# Patient Record
Sex: MALE | Race: WHITE | NOT HISPANIC OR LATINO | Employment: OTHER | URBAN - METROPOLITAN AREA
[De-identification: names, ages, dates, MRNs, and addresses within clinical notes are randomized per-mention and may not be internally consistent; named-entity substitution may affect disease eponyms.]

---

## 2017-02-16 PROBLEM — M10.9 GOUT: Status: ACTIVE | Noted: 2017-02-16

## 2017-02-16 PROBLEM — E66.9 MODERATE OBESITY: Status: ACTIVE | Noted: 2017-02-16

## 2017-02-16 PROBLEM — L40.9 PSORIASIS: Status: ACTIVE | Noted: 2017-02-16

## 2017-02-16 PROBLEM — R00.2 PALPITATIONS: Status: ACTIVE | Noted: 2017-02-16

## 2017-02-16 PROBLEM — E66.8 MODERATE OBESITY: Status: ACTIVE | Noted: 2017-02-16

## 2017-02-16 PROBLEM — I11.9 HYPERTENSIVE CARDIOVASCULAR DISEASE: Status: ACTIVE | Noted: 2017-02-16

## 2017-02-16 PROBLEM — K63.5 COLON POLYPS: Status: ACTIVE | Noted: 2017-02-16

## 2018-08-08 ENCOUNTER — OFFICE VISIT (OUTPATIENT)
Dept: ORTHOPEDIC SURGERY | Facility: CLINIC | Age: 83
End: 2018-08-08

## 2018-08-08 VITALS — BODY MASS INDEX: 31.47 KG/M2 | OXYGEN SATURATION: 99 % | HEART RATE: 52 BPM | HEIGHT: 64 IN | WEIGHT: 184.3 LBS

## 2018-08-08 DIAGNOSIS — M17.11 PRIMARY OSTEOARTHRITIS OF RIGHT KNEE: Primary | ICD-10-CM

## 2018-08-08 PROCEDURE — 99203 OFFICE O/P NEW LOW 30 MIN: CPT | Performed by: ORTHOPAEDIC SURGERY

## 2018-08-08 RX ORDER — CHLORHEXIDINE GLUCONATE 4 G/100ML
SOLUTION TOPICAL DAILY
Qty: 236 ML | Refills: 0 | Status: ON HOLD | OUTPATIENT
Start: 2018-08-08 | End: 2018-11-29

## 2018-08-08 RX ORDER — ALLOPURINOL 300 MG/1
300 TABLET ORAL DAILY
COMMUNITY

## 2018-08-08 RX ORDER — ACETAMINOPHEN 325 MG/1
1000 TABLET ORAL ONCE
Status: CANCELLED | OUTPATIENT
Start: 2018-08-08 | End: 2018-08-08

## 2018-08-08 RX ORDER — MELOXICAM 7.5 MG/1
15 TABLET ORAL ONCE
Status: CANCELLED | OUTPATIENT
Start: 2018-08-08 | End: 2018-08-08

## 2018-08-08 RX ORDER — LISINOPRIL AND HYDROCHLOROTHIAZIDE 20; 12.5 MG/1; MG/1
1 TABLET ORAL DAILY
COMMUNITY
Start: 2018-07-24

## 2018-08-08 RX ORDER — PREGABALIN 150 MG/1
150 CAPSULE ORAL ONCE
Status: CANCELLED | OUTPATIENT
Start: 2018-08-08 | End: 2018-08-08

## 2018-08-08 NOTE — PROGRESS NOTES
[facility]    Subjective     Chief Complaint   Patient presents with   • Right Knee - Pain     Right Knee Cartilage Removal done 2004 by Dr. Aragon        HPI    Raleigh Paredes is a 83 y.o. male.  He presents today for evaluation of right knee pain.  He's had moderate to severe knee pain, aching in quality, associated with grinding and stiffness for many years now.  He's had numerous Hyalgan injections since 2001, which helped briefly.  He is interested in discussing right knee replacement surgery, and would like to proceed later on this year.    The patient has been considering right knee total joint replacement surgery.  The pain has been severe and has been worsening in spite of medications, joint injections, and bracing. The pain interferes with walking and sleeping. No previous history of blood clots, nor family history of clotting disorders.      Patient Active Problem List   Diagnosis   • Hypertensive cardiovascular disease   • Palpitations   • Gout   • Moderate obesity   • Psoriasis   • Colon polyps     Past Medical History:   Diagnosis Date   • Colon polyps 2/16/2017   • Diverticulitis    • Diverticulosis    • Gout 2/16/2017   • Hypertensive cardiovascular disease 2/16/2017   • Moderate obesity 2/16/2017   • Palpitations 2/16/2017   • Psoriasis 2/16/2017      Past Surgical History:   Procedure Laterality Date   • CARDIAC CATHETERIZATION  2000   • CYST REMOVAL  1978    Cyst removed from spine, 1978.   • INGUINAL HERNIA REPAIR Bilateral 2009   • KNEE CARTILAGE SURGERY Right 2004    Right knee removal of cartilage, 2004, Dr. Dick.        Family History   Problem Relation Age of Onset   • Colon cancer Other      Social History     Social History   • Marital status:      Spouse name: N/A   • Number of children: N/A   • Years of education: N/A     Occupational History   • Not on file.     Social History Main Topics   • Smoking status: Former Smoker     Quit date: 1/1/1965    • Smokeless tobacco: Never Used   • Alcohol use No   • Drug use: No   • Sexual activity: Defer     Other Topics Concern   • Not on file     Social History Narrative   • No narrative on file      No current outpatient prescriptions on file prior to visit.     No current facility-administered medications on file prior to visit.       Allergies   Allergen Reactions   • Penicillins Itching     Hand edema        Review of Systems   Constitutional: Negative for activity change, appetite change, chills, diaphoresis, fatigue, fever and unexpected weight change.   HENT: Positive for drooling and hearing loss. Negative for congestion, dental problem, ear discharge, ear pain, facial swelling, mouth sores, nosebleeds, postnasal drip, rhinorrhea, sinus pressure, sneezing, sore throat, tinnitus, trouble swallowing and voice change.    Eyes: Negative for photophobia, pain, discharge, redness, itching and visual disturbance.   Respiratory: Negative for apnea, cough, choking, chest tightness, shortness of breath, wheezing and stridor.    Cardiovascular: Negative for chest pain, palpitations and leg swelling.   Gastrointestinal: Negative for abdominal distention, abdominal pain, anal bleeding, blood in stool, constipation, diarrhea, nausea, rectal pain and vomiting.   Endocrine: Negative for cold intolerance, heat intolerance, polydipsia, polyphagia and polyuria.   Genitourinary: Negative for decreased urine volume, difficulty urinating, dysuria, enuresis, flank pain, frequency, genital sores, hematuria and urgency.   Musculoskeletal: Positive for back pain. Negative for arthralgias, gait problem, myalgias, neck pain and neck stiffness.        Joint Pain    Skin: Negative for color change, pallor, rash and wound.   Allergic/Immunologic: Negative for environmental allergies, food allergies and immunocompromised state.   Neurological: Negative for dizziness, tremors, seizures, syncope, facial asymmetry, speech difficulty, weakness,  "light-headedness, numbness and headaches.   Hematological: Negative for adenopathy. Does not bruise/bleed easily.   Psychiatric/Behavioral: Negative for agitation, behavioral problems, confusion, decreased concentration, dysphoric mood, hallucinations, self-injury, sleep disturbance and suicidal ideas. The patient is not nervous/anxious and is not hyperactive.         Objective      Physical Exam  Pulse 52   Ht 162 cm (63.78\")   Wt 83.6 kg (184 lb 4.9 oz)   SpO2 99%   BMI 31.85 kg/m²     Body mass index is 31.85 kg/m².    General:   Mental Status:  Alert   Appearance: Cooperative, in no acute distress   Build and Nutrition: Well-nourished and well developed male   Orientation: Alert and oriented to person, place and time   Posture: Normal   Gait: Normal    Integument:   Right knee: No skin lesions, no rash, no ecchymosis    Neurologic:   Motor:  Right lower extremity: 5/5 quadriceps, hamstrings, ankle dorsiflexors, and ankle plantar flexors    Lower Extremities:   Right Knee:    Tenderness:  Medial joint line tenderness    Effusion:  None    Swelling:  None    Crepitus:  Positive    Atrophy:  None    Range of motion:  Extension: 0°       Flexion: 120°  Instability:  No varus laxity, no valgus laxity, negative anterior drawer  Deformities:  Varus        Imaging/Studies  Imaging Results (last 24 hours)     Procedure Component Value Units Date/Time    XR Knee 4+ View Right [[id number]] Resulted:  08/08/18 1051     Updated:  08/08/18 1052    Narrative:       Right Knee Radiographs  Indication: right knee pain  Views: Standing AP's and skiers of both knees, with lateral and sunrise   views of the right knee    Comparison: 3/27/2013    Findings:   Bone-on-bone contact medial compartment, with tricompartmental   osteophytes, significant varus alignment, and worsening compared to the   previous films from 2013.    Impression: Advanced right knee arthritis.            Assessment and Plan     Raleigh was seen today for " pain.    Diagnoses and all orders for this visit:    Primary osteoarthritis of right knee  -     XR Knee 4+ View Right  -     Case Request; Standing  -     Instructions on coughing, deep breathing, and incentive spirometry.; Future  -     CBC and Differential; Future  -     Basic metabolic panel; Future  -     Protime-INR; Future  -     APTT; Future  -     Hemoglobin A1c; Future  -     Sedimentation rate; Future  -     C-reactive protein; Future  -     Urinalysis With Culture If Indicated - Urine, Clean Catch; Future  -     Tranexamic Acid 1,000 mg in sodium chloride 0.9 % 100 mL; Infuse 1,000 mg into a venous catheter 1 (One) Time.  -     Tranexamic Acid 1,000 mg in sodium chloride 0.9 % 100 mL; Infuse 1,000 mg into a venous catheter 1 (One) Time.  -     acetaminophen (TYLENOL) tablet 975 mg; Take 3 tablets by mouth 1 (One) Time.  -     meloxicam (MOBIC) tablet 15 mg; Take 2 tablets by mouth 1 (One) Time.  -     pregabalin (LYRICA) capsule 150 mg; Take 1 capsule by mouth 1 (One) Time.  -     mupirocin (BACTROBAN) 2 % nasal ointment 1 application; 1 application by Each Nare route 1 (One) Time.  -     ceFAZolin (ANCEF) 2 g in sodium chloride 0.9 % 100 mL IVPB; Infuse 2 g into a venous catheter 1 (One) Time.  -     Case Request    Other orders  -     Outpatient In A Bed; Standing  -     Follow Anesthesia Guidelines / Standing Orders; Future  -     Obtain informed consent  -     Provide instructions to patient regarding NPO status  -     Clorhexidine skin prep  -     Follow Anesthesia Guidelines / Standing Orders; Standing  -     Nerve Block; Standing  -     Verify NPO Status; Standing  -     SCD (sequential compression device)- to be placed on patient in Pre-op; Standing  -     POC Glucose Once; Standing  -     Clip operative site; Standing  -     Obtain informed consent (if not collected inpatient or PAT); Standing  -     Notify Physician - Standard; Standing  -     NPO After Midnight  -     mupirocin (BACTROBAN  NASAL) 2 % nasal ointment; into the nostril(s) as directed by provider 2 (Two) Times a Day.  -     chlorhexidine (HIBICLENS) 4 % external liquid; Apply  topically to the appropriate area as directed Daily. Shower with hibiclens solution as directed for 5 days prior to surgery        I reviewed my findings with patient today.  He has advanced right knee arthritis, and is a candidate for knee replacement surgery.  We'll need to wait 3 months after his last injection before proceeding.  Please see my counseling note for details.  He has exhausted conservative treatment modalities.    Surgical Counseling     I have informed the patient of the diagnosis and the prognosis.  Exhaustive conservative treatment modalities have not resulted in long term pain relief.  The symptoms have progressed to the point of daily pain and inability to perform activities of daily living without significant pain. The patient has reached the point of desiring to proceed with total knee arthroplasty after discussing the risks, benefits and alternatives to the procedure.  The surgical procedure itself was discussed in detail. Risks of the procedure were discussed, which included but are not limited to, bleeding, infection, damage to blood vessels and nerves, incomplete pain relief, loosening of the prosthesis, deep infection, need for further surgery, loss of limb, deep venous thrombosis, pulmonary embolus, death, heart attack, stroke, kidney failure, liver failure, and anesthetic complications.  In addition, the potential for deep infection developing in the future was discussed, which could require further surgery.  The knee would have to be re-opened, debrided, and potentially remove the prosthesis, which may or may not be replaced in the future.  Also, the possibility for loosening of the prosthesis has been mentioned.  If the prosthesis loosened, a revision arthroplasty could be performed, with results that are not as predictable compared  to the original procedure.  The typical rehabilitative course has also been discussed, and full recovery may take up to a year to see the maximum benefit.  The importance of patient cooperation in the rehabilitative efforts has also been discussed.  No guarantees whatsoever were given.  The patient understands the potential risks versus the benefits and desires to proceed with total knee arthroplasty at a mutually convenient time.    Return for For surgery as planned.      Medical Decision Making  Management Options : major surgery with risk factors  Data/Risk: radiology tests and independent visualization of imaging, lab tests, or EMG/NCV      Ned Pizarro MD  08/08/18  12:38 PM

## 2018-11-15 ENCOUNTER — APPOINTMENT (OUTPATIENT)
Dept: PREADMISSION TESTING | Facility: HOSPITAL | Age: 83
End: 2018-11-15

## 2018-11-15 VITALS — WEIGHT: 194.67 LBS | BODY MASS INDEX: 34.49 KG/M2 | HEIGHT: 63 IN

## 2018-11-15 DIAGNOSIS — M17.11 PRIMARY OSTEOARTHRITIS OF RIGHT KNEE: ICD-10-CM

## 2018-11-15 LAB
ANION GAP SERPL CALCULATED.3IONS-SCNC: 4 MMOL/L (ref 3–11)
APTT PPP: 26.8 SECONDS (ref 24–31)
BACTERIA UR QL AUTO: NORMAL /HPF
BASOPHILS # BLD AUTO: 0.05 10*3/MM3 (ref 0–0.2)
BASOPHILS NFR BLD AUTO: 0.6 % (ref 0–1)
BILIRUB UR QL STRIP: NEGATIVE
BUN BLD-MCNC: 23 MG/DL (ref 9–23)
BUN/CREAT SERPL: 20.5 (ref 7–25)
CALCIUM SPEC-SCNC: 8.9 MG/DL (ref 8.7–10.4)
CHLORIDE SERPL-SCNC: 106 MMOL/L (ref 99–109)
CLARITY UR: CLEAR
CO2 SERPL-SCNC: 30 MMOL/L (ref 20–31)
COLOR UR: YELLOW
CREAT BLD-MCNC: 1.12 MG/DL (ref 0.6–1.3)
CRP SERPL-MCNC: 0.48 MG/DL (ref 0–1)
DEPRECATED RDW RBC AUTO: 42 FL (ref 37–54)
EOSINOPHIL # BLD AUTO: 0.52 10*3/MM3 (ref 0–0.3)
EOSINOPHIL NFR BLD AUTO: 6 % (ref 0–3)
ERYTHROCYTE [DISTWIDTH] IN BLOOD BY AUTOMATED COUNT: 12.9 % (ref 11.3–14.5)
ERYTHROCYTE [SEDIMENTATION RATE] IN BLOOD: 6 MM/HR (ref 0–20)
GFR SERPL CREATININE-BSD FRML MDRD: 62 ML/MIN/1.73
GLUCOSE BLD-MCNC: 90 MG/DL (ref 70–100)
GLUCOSE UR STRIP-MCNC: NEGATIVE MG/DL
HBA1C MFR BLD: 7.1 % (ref 4.8–5.6)
HCT VFR BLD AUTO: 44.7 % (ref 38.9–50.9)
HGB BLD-MCNC: 15.3 G/DL (ref 13.1–17.5)
HGB UR QL STRIP.AUTO: NEGATIVE
HYALINE CASTS UR QL AUTO: NORMAL /LPF
IMM GRANULOCYTES # BLD: 0.02 10*3/MM3 (ref 0–0.03)
IMM GRANULOCYTES NFR BLD: 0.2 % (ref 0–0.6)
INR PPP: 1 (ref 0.91–1.09)
KETONES UR QL STRIP: NEGATIVE
LEUKOCYTE ESTERASE UR QL STRIP.AUTO: NEGATIVE
LYMPHOCYTES # BLD AUTO: 1.66 10*3/MM3 (ref 0.6–4.8)
LYMPHOCYTES NFR BLD AUTO: 19.3 % (ref 24–44)
MCH RBC QN AUTO: 30.8 PG (ref 27–31)
MCHC RBC AUTO-ENTMCNC: 34.2 G/DL (ref 32–36)
MCV RBC AUTO: 90.1 FL (ref 80–99)
MONOCYTES # BLD AUTO: 0.84 10*3/MM3 (ref 0–1)
MONOCYTES NFR BLD AUTO: 9.7 % (ref 0–12)
NEUTROPHILS # BLD AUTO: 5.53 10*3/MM3 (ref 1.5–8.3)
NEUTROPHILS NFR BLD AUTO: 64.2 % (ref 41–71)
NITRITE UR QL STRIP: NEGATIVE
PH UR STRIP.AUTO: 5.5 [PH] (ref 5–8)
PLATELET # BLD AUTO: 178 10*3/MM3 (ref 150–450)
PMV BLD AUTO: 11 FL (ref 6–12)
POTASSIUM BLD-SCNC: 4.4 MMOL/L (ref 3.5–5.5)
PROT UR QL STRIP: NEGATIVE
PROTHROMBIN TIME: 10.5 SECONDS (ref 9.6–11.5)
RBC # BLD AUTO: 4.96 10*6/MM3 (ref 4.2–5.76)
RBC # UR: NORMAL /HPF
REF LAB TEST METHOD: NORMAL
SODIUM BLD-SCNC: 140 MMOL/L (ref 132–146)
SP GR UR STRIP: 1.02 (ref 1–1.03)
SQUAMOUS #/AREA URNS HPF: NORMAL /HPF
UROBILINOGEN UR QL STRIP: NORMAL
WBC NRBC COR # BLD: 8.62 10*3/MM3 (ref 3.5–10.8)
WBC UR QL AUTO: NORMAL /HPF

## 2018-11-15 PROCEDURE — 85025 COMPLETE CBC W/AUTO DIFF WBC: CPT | Performed by: ORTHOPAEDIC SURGERY

## 2018-11-15 PROCEDURE — 83036 HEMOGLOBIN GLYCOSYLATED A1C: CPT | Performed by: ORTHOPAEDIC SURGERY

## 2018-11-15 PROCEDURE — 86140 C-REACTIVE PROTEIN: CPT | Performed by: ORTHOPAEDIC SURGERY

## 2018-11-15 PROCEDURE — 36415 COLL VENOUS BLD VENIPUNCTURE: CPT

## 2018-11-15 PROCEDURE — 85652 RBC SED RATE AUTOMATED: CPT | Performed by: ORTHOPAEDIC SURGERY

## 2018-11-15 PROCEDURE — 85610 PROTHROMBIN TIME: CPT | Performed by: ORTHOPAEDIC SURGERY

## 2018-11-15 PROCEDURE — 80048 BASIC METABOLIC PNL TOTAL CA: CPT | Performed by: ORTHOPAEDIC SURGERY

## 2018-11-15 PROCEDURE — 81001 URINALYSIS AUTO W/SCOPE: CPT | Performed by: ORTHOPAEDIC SURGERY

## 2018-11-15 PROCEDURE — 85730 THROMBOPLASTIN TIME PARTIAL: CPT | Performed by: ORTHOPAEDIC SURGERY

## 2018-11-15 RX ORDER — NAPROXEN SODIUM 220 MG
220 TABLET ORAL 2 TIMES DAILY PRN
COMMUNITY
End: 2018-11-30 | Stop reason: HOSPADM

## 2018-11-15 ASSESSMENT — KOOS JR
KOOS JR SCORE: 15
KOOS JR SCORE: 50.012

## 2018-11-15 NOTE — DISCHARGE INSTRUCTIONS

## 2018-11-15 NOTE — PAT
prescription given to patient and explained.       Eras information given to patient and explained.       Patient plans to attend joint replacement class today.

## 2018-11-16 ENCOUNTER — TELEPHONE (OUTPATIENT)
Dept: ORTHOPEDIC SURGERY | Facility: CLINIC | Age: 83
End: 2018-11-16

## 2018-11-16 NOTE — TELEPHONE ENCOUNTER
----- Message from Ned Pizarro MD sent at 11/15/2018  3:43 PM EST -----  That's fine - thanks    ----- Message -----  From: Marifer Mililgan  Sent: 11/15/2018   3:37 PM  To: Ned Pizarro MD    A1C 7.1

## 2018-11-26 ENCOUNTER — DOCUMENTATION (OUTPATIENT)
Dept: SOCIAL WORK | Facility: HOSPITAL | Age: 83
End: 2018-11-26

## 2018-11-26 NOTE — PROGRESS NOTES
11/26 I spoke with Mr Paredes re: d/c plan for upcoming surgery on 11/29. He states his plan is to return home. He lives with wife and son who will be available to assist. He states both his son and daughter have had TKR's this year so he has equipment  (rolling walker, has handicapped toilet). We discussed that his insurance ( [employer]) usually approves overnight outpatient, he verbalized understanding. We discussed options for PT, he would like to use [facility]. I explained that there would be an outpatient co pay, he verbalized understanding and stated that would not be a problem. No other questions verbalized.  S.Kellerman RN, case mgt

## 2018-11-28 ENCOUNTER — ANESTHESIA EVENT (OUTPATIENT)
Dept: PERIOP | Facility: HOSPITAL | Age: 83
End: 2018-11-28

## 2018-11-28 RX ORDER — FAMOTIDINE 10 MG/ML
20 INJECTION, SOLUTION INTRAVENOUS ONCE
Status: CANCELLED | OUTPATIENT
Start: 2018-11-28 | End: 2018-11-28

## 2018-11-29 ENCOUNTER — APPOINTMENT (OUTPATIENT)
Dept: GENERAL RADIOLOGY | Facility: HOSPITAL | Age: 83
End: 2018-11-29

## 2018-11-29 ENCOUNTER — ANESTHESIA (OUTPATIENT)
Dept: PERIOP | Facility: HOSPITAL | Age: 83
End: 2018-11-29

## 2018-11-29 ENCOUNTER — HOSPITAL ENCOUNTER (OUTPATIENT)
Facility: HOSPITAL | Age: 83
Discharge: HOME HEALTH | End: 2018-11-30
Attending: ORTHOPAEDIC SURGERY | Admitting: ANESTHESIOLOGY

## 2018-11-29 DIAGNOSIS — M17.11 PRIMARY OSTEOARTHRITIS OF RIGHT KNEE: ICD-10-CM

## 2018-11-29 DIAGNOSIS — Z78.9 IMPAIRED MOBILITY AND ADLS: ICD-10-CM

## 2018-11-29 DIAGNOSIS — Z74.09 IMPAIRED FUNCTIONAL MOBILITY, BALANCE, GAIT, AND ENDURANCE: Primary | ICD-10-CM

## 2018-11-29 DIAGNOSIS — Z74.09 IMPAIRED MOBILITY AND ADLS: ICD-10-CM

## 2018-11-29 PROBLEM — Z96.651 STATUS POST TOTAL RIGHT KNEE REPLACEMENT: Status: ACTIVE | Noted: 2018-11-29

## 2018-11-29 PROBLEM — E11.9 DM (DIABETES MELLITUS) (HCC): Status: ACTIVE | Noted: 2018-11-29

## 2018-11-29 PROBLEM — I10 HTN (HYPERTENSION): Status: ACTIVE | Noted: 2018-11-29

## 2018-11-29 LAB
GLUCOSE BLDC GLUCOMTR-MCNC: 156 MG/DL (ref 70–130)
GLUCOSE BLDC GLUCOMTR-MCNC: 249 MG/DL (ref 70–130)
GLUCOSE BLDC GLUCOMTR-MCNC: 282 MG/DL (ref 70–130)
POTASSIUM BLDA-SCNC: 4.22 MMOL/L (ref 3.5–5.3)

## 2018-11-29 PROCEDURE — A9270 NON-COVERED ITEM OR SERVICE: HCPCS | Performed by: ORTHOPAEDIC SURGERY

## 2018-11-29 PROCEDURE — A9270 NON-COVERED ITEM OR SERVICE: HCPCS | Performed by: ANESTHESIOLOGY

## 2018-11-29 PROCEDURE — 25010000002 PHENYLEPHRINE PER 1 ML: Performed by: NURSE ANESTHETIST, CERTIFIED REGISTERED

## 2018-11-29 PROCEDURE — 82962 GLUCOSE BLOOD TEST: CPT

## 2018-11-29 PROCEDURE — 63710000001 MELOXICAM 7.5 MG TABLET: Performed by: ORTHOPAEDIC SURGERY

## 2018-11-29 PROCEDURE — 25010000003 CEFAZOLIN IN DEXTROSE 2-4 GM/100ML-% SOLUTION: Performed by: ORTHOPAEDIC SURGERY

## 2018-11-29 PROCEDURE — 73560 X-RAY EXAM OF KNEE 1 OR 2: CPT

## 2018-11-29 PROCEDURE — 63710000001 FAMOTIDINE 20 MG TABLET: Performed by: ANESTHESIOLOGY

## 2018-11-29 PROCEDURE — 25010000002 ROPIVACAINE PER 1 MG: Performed by: ORTHOPAEDIC SURGERY

## 2018-11-29 PROCEDURE — C1776 JOINT DEVICE (IMPLANTABLE): HCPCS | Performed by: ORTHOPAEDIC SURGERY

## 2018-11-29 PROCEDURE — 27447 TOTAL KNEE ARTHROPLASTY: CPT | Performed by: ORTHOPAEDIC SURGERY

## 2018-11-29 PROCEDURE — 25010000002 MORPHINE SULFATE (PF) 2 MG/ML SOLUTION: Performed by: ORTHOPAEDIC SURGERY

## 2018-11-29 PROCEDURE — 25010000002 ONDANSETRON PER 1 MG: Performed by: NURSE ANESTHETIST, CERTIFIED REGISTERED

## 2018-11-29 PROCEDURE — 63710000001 INSULIN LISPRO (HUMAN) PER 5 UNITS: Performed by: NURSE PRACTITIONER

## 2018-11-29 PROCEDURE — 63710000001 HYDROCODONE-ACETAMINOPHEN 7.5-325 MG TABLET: Performed by: INTERNAL MEDICINE

## 2018-11-29 PROCEDURE — C1713 ANCHOR/SCREW BN/BN,TIS/BN: HCPCS | Performed by: ORTHOPAEDIC SURGERY

## 2018-11-29 PROCEDURE — 25010000002 DEXAMETHASONE PER 1 MG: Performed by: NURSE ANESTHETIST, CERTIFIED REGISTERED

## 2018-11-29 PROCEDURE — 25010000002 ROPIVACAINE PER 1 MG: Performed by: NURSE ANESTHETIST, CERTIFIED REGISTERED

## 2018-11-29 PROCEDURE — A9270 NON-COVERED ITEM OR SERVICE: HCPCS | Performed by: NURSE PRACTITIONER

## 2018-11-29 PROCEDURE — 25010000002 PROPOFOL 1000 MG/ML EMULSION: Performed by: NURSE ANESTHETIST, CERTIFIED REGISTERED

## 2018-11-29 PROCEDURE — G8979 MOBILITY GOAL STATUS: HCPCS

## 2018-11-29 PROCEDURE — A9270 NON-COVERED ITEM OR SERVICE: HCPCS | Performed by: INTERNAL MEDICINE

## 2018-11-29 PROCEDURE — 97116 GAIT TRAINING THERAPY: CPT

## 2018-11-29 PROCEDURE — 63710000001 MUPIROCIN 2 % OINTMENT: Performed by: ORTHOPAEDIC SURGERY

## 2018-11-29 PROCEDURE — 63710000001 ACETAMINOPHEN 500 MG TABLET: Performed by: ORTHOPAEDIC SURGERY

## 2018-11-29 PROCEDURE — G8978 MOBILITY CURRENT STATUS: HCPCS

## 2018-11-29 PROCEDURE — 97161 PT EVAL LOW COMPLEX 20 MIN: CPT

## 2018-11-29 PROCEDURE — 63710000001 PREGABALIN 150 MG CAPSULE: Performed by: ORTHOPAEDIC SURGERY

## 2018-11-29 PROCEDURE — 84132 ASSAY OF SERUM POTASSIUM: CPT | Performed by: ANESTHESIOLOGY

## 2018-11-29 DEVICE — GEN II 7.5MM RESUR PAT 32MM
Type: IMPLANTABLE DEVICE | Site: KNEE | Status: FUNCTIONAL
Brand: GENESIS II

## 2018-11-29 DEVICE — LEGION POSTERIOR STABILIZED                                    OXINIUM FEMORAL SIZE 6 RIGHT
Type: IMPLANTABLE DEVICE | Site: KNEE | Status: FUNCTIONAL
Brand: LEGION

## 2018-11-29 DEVICE — GENESIS II NON-POROUS TIBIAL                                    BASEPLATE SIZE 4 RIGHT
Type: IMPLANTABLE DEVICE | Site: KNEE | Status: FUNCTIONAL
Brand: GENESIS II

## 2018-11-29 DEVICE — CMT BONE SIMPLEX/P FULL DOSE 10/PK: Type: IMPLANTABLE DEVICE | Site: KNEE | Status: FUNCTIONAL

## 2018-11-29 DEVICE — IMPLANTABLE DEVICE: Type: IMPLANTABLE DEVICE | Site: KNEE | Status: FUNCTIONAL

## 2018-11-29 DEVICE — LEGION POSTERIOR STABILIZED HIGH                                    FLEX HIGHLY CROSS LINKED                                    POLYETHYLENE SIZE 3-4 9MM
Type: IMPLANTABLE DEVICE | Site: KNEE | Status: FUNCTIONAL
Brand: LEGION

## 2018-11-29 RX ORDER — MELOXICAM 7.5 MG/1
15 TABLET ORAL ONCE
Status: COMPLETED | OUTPATIENT
Start: 2018-11-29 | End: 2018-11-29

## 2018-11-29 RX ORDER — MORPHINE SULFATE 2 MG/ML
1 INJECTION, SOLUTION INTRAMUSCULAR; INTRAVENOUS
Status: DISCONTINUED | OUTPATIENT
Start: 2018-11-29 | End: 2018-11-30 | Stop reason: HOSPADM

## 2018-11-29 RX ORDER — FENTANYL CITRATE 50 UG/ML
50 INJECTION, SOLUTION INTRAMUSCULAR; INTRAVENOUS
Status: DISCONTINUED | OUTPATIENT
Start: 2018-11-29 | End: 2018-11-30 | Stop reason: HOSPADM

## 2018-11-29 RX ORDER — ONDANSETRON 2 MG/ML
4 INJECTION INTRAMUSCULAR; INTRAVENOUS EVERY 6 HOURS PRN
Status: DISCONTINUED | OUTPATIENT
Start: 2018-11-29 | End: 2018-11-30 | Stop reason: HOSPADM

## 2018-11-29 RX ORDER — CEFAZOLIN SODIUM 2 G/100ML
2 INJECTION, SOLUTION INTRAVENOUS EVERY 8 HOURS
Status: COMPLETED | OUTPATIENT
Start: 2018-11-29 | End: 2018-11-30

## 2018-11-29 RX ORDER — ALLOPURINOL 300 MG/1
300 TABLET ORAL DAILY
Status: DISCONTINUED | OUTPATIENT
Start: 2018-11-30 | End: 2018-11-30 | Stop reason: HOSPADM

## 2018-11-29 RX ORDER — BISACODYL 5 MG/1
10 TABLET, DELAYED RELEASE ORAL DAILY PRN
Status: DISCONTINUED | OUTPATIENT
Start: 2018-11-29 | End: 2018-11-30 | Stop reason: HOSPADM

## 2018-11-29 RX ORDER — PREGABALIN 150 MG/1
150 CAPSULE ORAL ONCE
Status: COMPLETED | OUTPATIENT
Start: 2018-11-29 | End: 2018-11-29

## 2018-11-29 RX ORDER — FAMOTIDINE 20 MG/1
20 TABLET, FILM COATED ORAL ONCE
Status: COMPLETED | OUTPATIENT
Start: 2018-11-29 | End: 2018-11-29

## 2018-11-29 RX ORDER — CEFAZOLIN SODIUM 2 G/100ML
2 INJECTION, SOLUTION INTRAVENOUS ONCE
Status: DISCONTINUED | OUTPATIENT
Start: 2018-11-29 | End: 2018-11-29

## 2018-11-29 RX ORDER — MELOXICAM 7.5 MG/1
15 TABLET ORAL DAILY
Status: DISCONTINUED | OUTPATIENT
Start: 2018-11-30 | End: 2018-11-30 | Stop reason: HOSPADM

## 2018-11-29 RX ORDER — SODIUM CHLORIDE 0.9 % (FLUSH) 0.9 %
3 SYRINGE (ML) INJECTION EVERY 12 HOURS SCHEDULED
Status: DISCONTINUED | OUTPATIENT
Start: 2018-11-29 | End: 2018-11-30 | Stop reason: HOSPADM

## 2018-11-29 RX ORDER — ROPIVACAINE HYDROCHLORIDE 5 MG/ML
INJECTION, SOLUTION EPIDURAL; INFILTRATION; PERINEURAL AS NEEDED
Status: DISCONTINUED | OUTPATIENT
Start: 2018-11-29 | End: 2018-11-29 | Stop reason: HOSPADM

## 2018-11-29 RX ORDER — SODIUM CHLORIDE, SODIUM LACTATE, POTASSIUM CHLORIDE, CALCIUM CHLORIDE 600; 310; 30; 20 MG/100ML; MG/100ML; MG/100ML; MG/100ML
9 INJECTION, SOLUTION INTRAVENOUS CONTINUOUS
Status: DISCONTINUED | OUTPATIENT
Start: 2018-11-29 | End: 2018-11-29

## 2018-11-29 RX ORDER — SODIUM CHLORIDE 0.9 % (FLUSH) 0.9 %
3 SYRINGE (ML) INJECTION EVERY 12 HOURS SCHEDULED
Status: DISCONTINUED | OUTPATIENT
Start: 2018-11-29 | End: 2018-11-29 | Stop reason: HOSPADM

## 2018-11-29 RX ORDER — NICOTINE POLACRILEX 4 MG
15 LOZENGE BUCCAL
Status: DISCONTINUED | OUTPATIENT
Start: 2018-11-29 | End: 2018-11-30 | Stop reason: HOSPADM

## 2018-11-29 RX ORDER — EPHEDRINE SULFATE 50 MG/ML
5 INJECTION, SOLUTION INTRAVENOUS ONCE AS NEEDED
Status: DISCONTINUED | OUTPATIENT
Start: 2018-11-29 | End: 2018-11-30 | Stop reason: HOSPADM

## 2018-11-29 RX ORDER — BUPIVACAINE HYDROCHLORIDE 2.5 MG/ML
INJECTION, SOLUTION EPIDURAL; INFILTRATION; INTRACAUDAL
Status: DISCONTINUED | OUTPATIENT
Start: 2018-11-29 | End: 2018-11-29 | Stop reason: SURG

## 2018-11-29 RX ORDER — SODIUM CHLORIDE 0.9 % (FLUSH) 0.9 %
1-10 SYRINGE (ML) INJECTION AS NEEDED
Status: DISCONTINUED | OUTPATIENT
Start: 2018-11-29 | End: 2018-11-30 | Stop reason: HOSPADM

## 2018-11-29 RX ORDER — SODIUM CHLORIDE 0.9 % (FLUSH) 0.9 %
3-10 SYRINGE (ML) INJECTION AS NEEDED
Status: DISCONTINUED | OUTPATIENT
Start: 2018-11-29 | End: 2018-11-29 | Stop reason: HOSPADM

## 2018-11-29 RX ORDER — HYDROCODONE BITARTRATE AND ACETAMINOPHEN 7.5; 325 MG/1; MG/1
1 TABLET ORAL EVERY 4 HOURS PRN
Status: DISCONTINUED | OUTPATIENT
Start: 2018-11-29 | End: 2018-11-30 | Stop reason: HOSPADM

## 2018-11-29 RX ORDER — MAGNESIUM HYDROXIDE 1200 MG/15ML
LIQUID ORAL AS NEEDED
Status: DISCONTINUED | OUTPATIENT
Start: 2018-11-29 | End: 2018-11-29 | Stop reason: HOSPADM

## 2018-11-29 RX ORDER — BUPIVACAINE HYDROCHLORIDE 5 MG/ML
INJECTION, SOLUTION EPIDURAL; INTRACAUDAL
Status: COMPLETED | OUTPATIENT
Start: 2018-11-29 | End: 2018-11-29

## 2018-11-29 RX ORDER — ACETAMINOPHEN 500 MG
1000 TABLET ORAL ONCE
Status: COMPLETED | OUTPATIENT
Start: 2018-11-29 | End: 2018-11-29

## 2018-11-29 RX ORDER — DEXAMETHASONE SODIUM PHOSPHATE 4 MG/ML
INJECTION, SOLUTION INTRA-ARTICULAR; INTRALESIONAL; INTRAMUSCULAR; INTRAVENOUS; SOFT TISSUE AS NEEDED
Status: DISCONTINUED | OUTPATIENT
Start: 2018-11-29 | End: 2018-11-29 | Stop reason: SURG

## 2018-11-29 RX ORDER — ONDANSETRON 2 MG/ML
INJECTION INTRAMUSCULAR; INTRAVENOUS AS NEEDED
Status: DISCONTINUED | OUTPATIENT
Start: 2018-11-29 | End: 2018-11-29 | Stop reason: SURG

## 2018-11-29 RX ORDER — DOCUSATE SODIUM 100 MG/1
100 CAPSULE, LIQUID FILLED ORAL 2 TIMES DAILY PRN
Status: DISCONTINUED | OUTPATIENT
Start: 2018-11-29 | End: 2018-11-30 | Stop reason: HOSPADM

## 2018-11-29 RX ORDER — DEXTROSE MONOHYDRATE 25 G/50ML
25 INJECTION, SOLUTION INTRAVENOUS
Status: DISCONTINUED | OUTPATIENT
Start: 2018-11-29 | End: 2018-11-30 | Stop reason: HOSPADM

## 2018-11-29 RX ORDER — ASPIRIN 325 MG
325 TABLET, DELAYED RELEASE (ENTERIC COATED) ORAL DAILY
Status: DISCONTINUED | OUTPATIENT
Start: 2018-11-30 | End: 2018-11-30 | Stop reason: HOSPADM

## 2018-11-29 RX ORDER — LABETALOL HYDROCHLORIDE 5 MG/ML
10 INJECTION, SOLUTION INTRAVENOUS EVERY 4 HOURS PRN
Status: DISCONTINUED | OUTPATIENT
Start: 2018-11-29 | End: 2018-11-30 | Stop reason: HOSPADM

## 2018-11-29 RX ORDER — ONDANSETRON 4 MG/1
4 TABLET, FILM COATED ORAL EVERY 6 HOURS PRN
Status: DISCONTINUED | OUTPATIENT
Start: 2018-11-29 | End: 2018-11-30 | Stop reason: HOSPADM

## 2018-11-29 RX ORDER — HYDROMORPHONE HYDROCHLORIDE 1 MG/ML
0.25 INJECTION, SOLUTION INTRAMUSCULAR; INTRAVENOUS; SUBCUTANEOUS
Status: DISCONTINUED | OUTPATIENT
Start: 2018-11-29 | End: 2018-11-30 | Stop reason: HOSPADM

## 2018-11-29 RX ORDER — BISACODYL 10 MG
10 SUPPOSITORY, RECTAL RECTAL DAILY PRN
Status: DISCONTINUED | OUTPATIENT
Start: 2018-11-29 | End: 2018-11-30 | Stop reason: HOSPADM

## 2018-11-29 RX ORDER — NALOXONE HCL 0.4 MG/ML
0.1 VIAL (ML) INJECTION
Status: DISCONTINUED | OUTPATIENT
Start: 2018-11-29 | End: 2018-11-30 | Stop reason: HOSPADM

## 2018-11-29 RX ORDER — SODIUM CHLORIDE 9 MG/ML
120 INJECTION, SOLUTION INTRAVENOUS CONTINUOUS
Status: DISCONTINUED | OUTPATIENT
Start: 2018-11-29 | End: 2018-11-30 | Stop reason: HOSPADM

## 2018-11-29 RX ORDER — LIDOCAINE HYDROCHLORIDE 10 MG/ML
0.5 INJECTION, SOLUTION EPIDURAL; INFILTRATION; INTRACAUDAL; PERINEURAL ONCE AS NEEDED
Status: COMPLETED | OUTPATIENT
Start: 2018-11-29 | End: 2018-11-29

## 2018-11-29 RX ORDER — NALOXONE HCL 0.4 MG/ML
0.4 VIAL (ML) INJECTION
Status: DISCONTINUED | OUTPATIENT
Start: 2018-11-29 | End: 2018-11-30 | Stop reason: HOSPADM

## 2018-11-29 RX ORDER — ONDANSETRON 2 MG/ML
4 INJECTION INTRAMUSCULAR; INTRAVENOUS ONCE AS NEEDED
Status: DISCONTINUED | OUTPATIENT
Start: 2018-11-29 | End: 2018-11-30 | Stop reason: HOSPADM

## 2018-11-29 RX ORDER — LISINOPRIL 20 MG/1
20 TABLET ORAL
Status: DISCONTINUED | OUTPATIENT
Start: 2018-11-30 | End: 2018-11-30 | Stop reason: HOSPADM

## 2018-11-29 RX ADMIN — ACETAMINOPHEN 1000 MG: 500 TABLET ORAL at 06:33

## 2018-11-29 RX ADMIN — TRANEXAMIC ACID 1000 MG: 100 INJECTION, SOLUTION INTRAVENOUS at 07:48

## 2018-11-29 RX ADMIN — PREGABALIN 150 MG: 150 CAPSULE ORAL at 06:33

## 2018-11-29 RX ADMIN — BUPIVACAINE HYDROCHLORIDE 2 ML: 5 INJECTION, SOLUTION EPIDURAL; INTRACAUDAL at 07:37

## 2018-11-29 RX ADMIN — MUPIROCIN 1 APPLICATION: 20 OINTMENT TOPICAL at 06:34

## 2018-11-29 RX ADMIN — PHENYLEPHRINE HYDROCHLORIDE 100 MCG: 10 INJECTION INTRAVENOUS at 07:45

## 2018-11-29 RX ADMIN — INSULIN LISPRO 4 UNITS: 100 INJECTION, SOLUTION INTRAVENOUS; SUBCUTANEOUS at 17:35

## 2018-11-29 RX ADMIN — LIDOCAINE HYDROCHLORIDE 0.2 ML: 10 INJECTION, SOLUTION EPIDURAL; INFILTRATION; INTRACAUDAL; PERINEURAL at 06:34

## 2018-11-29 RX ADMIN — DEXAMETHASONE SODIUM PHOSPHATE 8 MG: 4 INJECTION, SOLUTION INTRAMUSCULAR; INTRAVENOUS at 07:50

## 2018-11-29 RX ADMIN — CEFAZOLIN SODIUM 2 G: 2 INJECTION, SOLUTION INTRAVENOUS at 15:51

## 2018-11-29 RX ADMIN — ROPIVACAINE HYDROCHLORIDE 10 ML/HR: 5 INJECTION, SOLUTION EPIDURAL; INFILTRATION; PERINEURAL at 10:00

## 2018-11-29 RX ADMIN — CEFAZOLIN SODIUM 2 G: 2 INJECTION, SOLUTION INTRAVENOUS at 07:33

## 2018-11-29 RX ADMIN — TRANEXAMIC ACID 1000 MG: 100 INJECTION, SOLUTION INTRAVENOUS at 09:00

## 2018-11-29 RX ADMIN — SODIUM CHLORIDE, POTASSIUM CHLORIDE, SODIUM LACTATE AND CALCIUM CHLORIDE 9 ML/HR: 600; 310; 30; 20 INJECTION, SOLUTION INTRAVENOUS at 06:34

## 2018-11-29 RX ADMIN — PROPOFOL 50 MCG/KG/MIN: 10 INJECTION, EMULSION INTRAVENOUS at 07:50

## 2018-11-29 RX ADMIN — FAMOTIDINE 20 MG: 20 TABLET ORAL at 06:34

## 2018-11-29 RX ADMIN — MORPHINE SULFATE 1 MG: 2 INJECTION, SOLUTION INTRAMUSCULAR; INTRAVENOUS at 11:52

## 2018-11-29 RX ADMIN — EPHEDRINE SULFATE 10 MG: 50 INJECTION INTRAMUSCULAR; INTRAVENOUS; SUBCUTANEOUS at 07:42

## 2018-11-29 RX ADMIN — PHENYLEPHRINE HYDROCHLORIDE 0.5 MCG/KG/MIN: 10 INJECTION INTRAVENOUS at 07:45

## 2018-11-29 RX ADMIN — INSULIN LISPRO 3 UNITS: 100 INJECTION, SOLUTION INTRAVENOUS; SUBCUTANEOUS at 21:00

## 2018-11-29 RX ADMIN — MELOXICAM 15 MG: 7.5 TABLET ORAL at 06:34

## 2018-11-29 RX ADMIN — EPHEDRINE SULFATE 10 MG: 50 INJECTION INTRAMUSCULAR; INTRAVENOUS; SUBCUTANEOUS at 07:38

## 2018-11-29 RX ADMIN — HYDROCODONE BITARTRATE AND ACETAMINOPHEN 1 TABLET: 7.5; 325 TABLET ORAL at 14:45

## 2018-11-29 RX ADMIN — MORPHINE SULFATE 1 MG: 2 INJECTION, SOLUTION INTRAMUSCULAR; INTRAVENOUS at 17:39

## 2018-11-29 RX ADMIN — ONDANSETRON 4 MG: 2 INJECTION INTRAMUSCULAR; INTRAVENOUS at 09:40

## 2018-11-29 RX ADMIN — BUPIVACAINE HYDROCHLORIDE 20 ML: 2.5 INJECTION, SOLUTION EPIDURAL; INFILTRATION; INTRACAUDAL; PERINEURAL at 09:55

## 2018-11-29 RX ADMIN — SODIUM CHLORIDE 120 ML/HR: 9 INJECTION, SOLUTION INTRAVENOUS at 10:19

## 2018-11-29 RX ADMIN — CEFAZOLIN SODIUM 2 G: 2 INJECTION, SOLUTION INTRAVENOUS at 23:40

## 2018-11-30 VITALS
SYSTOLIC BLOOD PRESSURE: 125 MMHG | HEIGHT: 63 IN | RESPIRATION RATE: 18 BRPM | HEART RATE: 72 BPM | DIASTOLIC BLOOD PRESSURE: 72 MMHG | OXYGEN SATURATION: 97 % | BODY MASS INDEX: 34.49 KG/M2 | WEIGHT: 194.67 LBS | TEMPERATURE: 97.7 F

## 2018-11-30 PROBLEM — G89.18 ACUTE POSTOPERATIVE PAIN: Status: ACTIVE | Noted: 2018-11-30

## 2018-11-30 PROBLEM — D72.829 LEUKOCYTOSIS: Status: ACTIVE | Noted: 2018-11-30

## 2018-11-30 LAB
ANION GAP SERPL CALCULATED.3IONS-SCNC: 7 MMOL/L (ref 3–11)
BUN BLD-MCNC: 27 MG/DL (ref 9–23)
BUN/CREAT SERPL: 21.8 (ref 7–25)
CALCIUM SPEC-SCNC: 8.4 MG/DL (ref 8.7–10.4)
CHLORIDE SERPL-SCNC: 99 MMOL/L (ref 99–109)
CO2 SERPL-SCNC: 26 MMOL/L (ref 20–31)
CREAT BLD-MCNC: 1.24 MG/DL (ref 0.6–1.3)
DEPRECATED RDW RBC AUTO: 43.5 FL (ref 37–54)
ERYTHROCYTE [DISTWIDTH] IN BLOOD BY AUTOMATED COUNT: 13.2 % (ref 11.3–14.5)
GFR SERPL CREATININE-BSD FRML MDRD: 56 ML/MIN/1.73
GLUCOSE BLD-MCNC: 170 MG/DL (ref 70–100)
GLUCOSE BLDC GLUCOMTR-MCNC: 157 MG/DL (ref 70–130)
GLUCOSE BLDC GLUCOMTR-MCNC: 226 MG/DL (ref 70–130)
HCT VFR BLD AUTO: 38.7 % (ref 38.9–50.9)
HGB BLD-MCNC: 13.1 G/DL (ref 13.1–17.5)
MCH RBC QN AUTO: 30.8 PG (ref 27–31)
MCHC RBC AUTO-ENTMCNC: 33.9 G/DL (ref 32–36)
MCV RBC AUTO: 91.1 FL (ref 80–99)
PLATELET # BLD AUTO: 165 10*3/MM3 (ref 150–450)
PMV BLD AUTO: 12.4 FL (ref 6–12)
POTASSIUM BLD-SCNC: 3.9 MMOL/L (ref 3.5–5.5)
RBC # BLD AUTO: 4.25 10*6/MM3 (ref 4.2–5.76)
SODIUM BLD-SCNC: 132 MMOL/L (ref 132–146)
WBC NRBC COR # BLD: 12.63 10*3/MM3 (ref 3.5–10.8)

## 2018-11-30 PROCEDURE — G0108 DIAB MANAGE TRN  PER INDIV: HCPCS

## 2018-11-30 PROCEDURE — 85027 COMPLETE CBC AUTOMATED: CPT | Performed by: ORTHOPAEDIC SURGERY

## 2018-11-30 PROCEDURE — 63710000001 MELOXICAM 7.5 MG TABLET: Performed by: ORTHOPAEDIC SURGERY

## 2018-11-30 PROCEDURE — G8978 MOBILITY CURRENT STATUS: HCPCS

## 2018-11-30 PROCEDURE — 63710000001 ASPIRIN EC 325 MG TABLET DELAYED-RELEASE: Performed by: ORTHOPAEDIC SURGERY

## 2018-11-30 PROCEDURE — G8988 SELF CARE GOAL STATUS: HCPCS

## 2018-11-30 PROCEDURE — 63710000001 HYDROCODONE-ACETAMINOPHEN 7.5-325 MG TABLET: Performed by: INTERNAL MEDICINE

## 2018-11-30 PROCEDURE — A9270 NON-COVERED ITEM OR SERVICE: HCPCS | Performed by: ORTHOPAEDIC SURGERY

## 2018-11-30 PROCEDURE — G8980 MOBILITY D/C STATUS: HCPCS

## 2018-11-30 PROCEDURE — G8987 SELF CARE CURRENT STATUS: HCPCS

## 2018-11-30 PROCEDURE — 63710000001 LISINOPRIL 20 MG TABLET: Performed by: NURSE PRACTITIONER

## 2018-11-30 PROCEDURE — 63710000001 METOPROLOL TARTRATE 25 MG TABLET: Performed by: NURSE PRACTITIONER

## 2018-11-30 PROCEDURE — G8979 MOBILITY GOAL STATUS: HCPCS

## 2018-11-30 PROCEDURE — 63710000001 ALLOPURINOL 300 MG TABLET: Performed by: NURSE PRACTITIONER

## 2018-11-30 PROCEDURE — A9270 NON-COVERED ITEM OR SERVICE: HCPCS | Performed by: NURSE PRACTITIONER

## 2018-11-30 PROCEDURE — 97535 SELF CARE MNGMENT TRAINING: CPT

## 2018-11-30 PROCEDURE — 97116 GAIT TRAINING THERAPY: CPT

## 2018-11-30 PROCEDURE — G8989 SELF CARE D/C STATUS: HCPCS

## 2018-11-30 PROCEDURE — 80048 BASIC METABOLIC PNL TOTAL CA: CPT | Performed by: NURSE PRACTITIONER

## 2018-11-30 PROCEDURE — 82962 GLUCOSE BLOOD TEST: CPT

## 2018-11-30 PROCEDURE — 97165 OT EVAL LOW COMPLEX 30 MIN: CPT

## 2018-11-30 PROCEDURE — A9270 NON-COVERED ITEM OR SERVICE: HCPCS | Performed by: INTERNAL MEDICINE

## 2018-11-30 RX ORDER — HYDROCODONE BITARTRATE AND ACETAMINOPHEN 7.5; 325 MG/1; MG/1
1 TABLET ORAL EVERY 4 HOURS PRN
Qty: 40 TABLET | Refills: 0 | Status: SHIPPED | OUTPATIENT
Start: 2018-11-30 | End: 2018-12-30

## 2018-11-30 RX ORDER — PSEUDOEPHEDRINE HCL 30 MG
100 TABLET ORAL 2 TIMES DAILY PRN
Qty: 60 EACH | Refills: 0 | Status: SHIPPED | OUTPATIENT
Start: 2018-11-30 | End: 2018-11-30

## 2018-11-30 RX ORDER — HYDROCODONE BITARTRATE AND ACETAMINOPHEN 7.5; 325 MG/1; MG/1
1 TABLET ORAL EVERY 4 HOURS PRN
Qty: 40 TABLET | Refills: 0 | Status: SHIPPED | OUTPATIENT
Start: 2018-11-30 | End: 2018-11-30

## 2018-11-30 RX ORDER — PSEUDOEPHEDRINE HCL 30 MG
100 TABLET ORAL 2 TIMES DAILY PRN
Qty: 60 EACH | Refills: 0 | Status: SHIPPED | OUTPATIENT
Start: 2018-11-30 | End: 2019-05-29

## 2018-11-30 RX ADMIN — ASPIRIN 325 MG: 325 TABLET, DELAYED RELEASE ORAL at 09:06

## 2018-11-30 RX ADMIN — INSULIN LISPRO 4 UNITS: 100 INJECTION, SOLUTION INTRAVENOUS; SUBCUTANEOUS at 12:09

## 2018-11-30 RX ADMIN — MELOXICAM 15 MG: 7.5 TABLET ORAL at 09:04

## 2018-11-30 RX ADMIN — HYDROCODONE BITARTRATE AND ACETAMINOPHEN 1 TABLET: 7.5; 325 TABLET ORAL at 09:40

## 2018-11-30 RX ADMIN — INSULIN LISPRO 2 UNITS: 100 INJECTION, SOLUTION INTRAVENOUS; SUBCUTANEOUS at 09:04

## 2018-11-30 RX ADMIN — HYDROCODONE BITARTRATE AND ACETAMINOPHEN 1 TABLET: 7.5; 325 TABLET ORAL at 01:15

## 2018-11-30 RX ADMIN — SODIUM CHLORIDE, PRESERVATIVE FREE 3 ML: 5 INJECTION INTRAVENOUS at 09:06

## 2018-11-30 RX ADMIN — SODIUM CHLORIDE 120 ML/HR: 9 INJECTION, SOLUTION INTRAVENOUS at 02:02

## 2018-11-30 RX ADMIN — LISINOPRIL 20 MG: 20 TABLET ORAL at 09:05

## 2018-11-30 RX ADMIN — METOPROLOL TARTRATE 25 MG: 25 TABLET ORAL at 09:04

## 2018-11-30 RX ADMIN — ALLOPURINOL 300 MG: 300 TABLET ORAL at 09:06

## 2018-12-01 NOTE — PROGRESS NOTES
[facility]    Nerve Cath Post Op Call    Patient Name: Raleigh Paredes  :  1934  MRN:  [id number]  Date of Discharge: 2018    Nerve Cath Post Op Call:    Analgesia:Excellent  Pain Score:1/10  Side Effects:None  Catheter Site:clean  Patient Controlled ON Q pump infusion rate: 10ml/hr  Catheter Plan:Will continue with plan at home without changes    PATIENT HAD FRAYED CATHETER AND WE HAVE TRIMMED AND RECONNECTED= WORKING WELL AT PRESENT.

## 2018-12-02 NOTE — PROGRESS NOTES
[facility]    Nerve Cath Post Op Call    Patient Name: Raleigh Paredes  :  1934  MRN:  [id number]  Date of Discharge: 2018    Nerve Cath Post Op Call:    Analgesia:Good  Pain Score:1/10  Side Effects:None  Catheter Site:clean  Patient Controlled ON Q pump infusion rate: 10ml/hr  Catheter Plan:Patient/Family member was instructed to remove the catheter during telephone contact and The patient was instructed to call ON CALL Anesthesia provider for any questions or problems      Son is going to remove after Islam it has run out  Happy with pain service.

## 2018-12-03 NOTE — THERAPY DISCHARGE NOTE
Acute Care - Occupational Therapy Discharge Summary  [facility]     Patient Name: Raleigh Paredes  : 1934  MRN: [id number]    Today's Date: 12/3/2018  Onset of Illness/Injury or Date of Surgery: 18    Date of Referral to OT: 18  Referring Physician: MD Juarez      Admit Date: 2018        OT Recommendation and Plan    Visit Dx:    ICD-10-CM ICD-9-CM   1. Impaired functional mobility, balance, gait, and endurance Z74.09 V49.89   2. Primary osteoarthritis of right knee M17.11 715.16   3. Impaired mobility and ADLs Z74.09 799.89               OT Rehab Goals     Row Name 18 1407             Bed Mobility Goal 1 (OT)    Progress/Outcomes (Bed Mobility Goal 1, OT)  goal not met;discharged from facility  -KEDAR         Transfer Goal 1 (OT)    Progress/Outcome (Transfer Goal 1, OT)  goal not met;discharged from facility  -KEDAR         Dressing Goal 1 (OT)    Progress/Outcome (Dressing Goal 1, OT)  goal not met;discharged from facility  -KEDAR         Toileting Goal 1 (OT)    Progress/Outcome (Toileting Goal 1, OT)  goal not met;discharged from facility  -KEDAR        User Key  (r) = Recorded By, (t) = Taken By, (c) = Cosigned By    Initials Name Provider Type Discipline    Breanna Belle, OT Occupational Therapist OT              Therapy Suggested Charges     Code   Minutes Charges    67620 (CPT®) Hc Ot Neuromusc Re Education Ea 15 Min      85329 (CPT®) Hc Ot Ther Proc Ea 15 Min      28517 (CPT®) Hc Ot Therapeutic Act Ea 15 Min      73500 (CPT®) Hc Ot Manual Therapy Ea 15 Min      96823 (CPT®) Hc Ot Iontophoresis Ea 15 Min      95479 (CPT®) Hc Ot Elec Stim Ea-Per 15 Min      79862 (CPT®) Hc Ot Ultrasound Ea 15 Min      23927 (CPT®) Hc Ot Self Care/Mgmt/Train Ea 15 Min 10 1    Total  10 1              OT Discharge Summary  Reason for Discharge: Discharge from facility  Outcomes Achieved: Discharge from facility occurred on same date as evluation  Discharge Destination: Home with assist, Home with  outpatient services      Breanna Silva, OT  12/3/2018

## 2018-12-04 ENCOUNTER — OFFICE VISIT (OUTPATIENT)
Dept: ORTHOPEDIC SURGERY | Facility: CLINIC | Age: 83
End: 2018-12-04

## 2018-12-04 ENCOUNTER — TELEPHONE (OUTPATIENT)
Dept: ORTHOPEDIC SURGERY | Facility: CLINIC | Age: 83
End: 2018-12-04

## 2018-12-04 DIAGNOSIS — Z96.651 STATUS POST RIGHT KNEE REPLACEMENT: Primary | ICD-10-CM

## 2018-12-04 PROCEDURE — 99024 POSTOP FOLLOW-UP VISIT: CPT | Performed by: PHYSICIAN ASSISTANT

## 2018-12-04 NOTE — PROGRESS NOTES
[facility] Note    Subjective     Patient: Raleigh Paredes  : 1934    Primary Care Provider: Raleigh Subramanian MD    Requesting Provider: As above    Post-op (5 days status post Right Knee Arthroplasty 18)      History    History of Present Illness: Patient presents to orthopedic clinic for evaluation of his surgical incision.  His family contacted the clinic this morning concerned about his bandage being soaked with blood.  According to the patient drainage/oozing of blood is been ongoing since Friday.  He is currently taking aspirin daily for DVT chemotherapy prophylaxis - no other blood thinners or anticoagulation.  No other complaints or issues.  Surgery was performed on 18 by Dr. Pizarro.  He denies any fever, chills, nausea, vomiting, night sweats or other constitutional symptoms.  He's working with physical therapy.  Current pain scale 4/10.  Severity the pain mild to moderate.  Quality pain aching.  Currently sitting in wheelchair but using a walker for ambulation assistance.       Current Outpatient Medications on File Prior to Visit   Medication Sig Dispense Refill   • allopurinol (ZYLOPRIM) 300 MG tablet Take 300 mg by mouth Daily.     • aspirin 325 MG EC tablet Take 1 tablet by mouth Daily. For 1 month 30 tablet 0   • docusate sodium 100 MG capsule Take 1 capsule by mouth 2 (Two) Times a Day As Needed (constipation). 60 each 0   • HYDROcodone-acetaminophen (NORCO) 7.5-325 MG per tablet Take 1 tablet by mouth Every 4 (Four) Hours As Needed for Moderate Pain  for up to 30 days. 40 tablet 0   • lisinopril-hydrochlorothiazide (PRINZIDE,ZESTORETIC) 20-12.5 MG per tablet Take 1 tablet by mouth Daily.     • metoprolol tartrate (LOPRESSOR) 25 MG tablet Take 25 mg by mouth 2 (Two) Times a Day.       No current facility-administered medications on file prior to visit.       Allergies   Allergen Reactions   • Penicillins Itching     Hand edema      Past Medical History:    Diagnosis Date   • Arthritis     knees and hands    • Borderline diabetes    • Cataract     bilat - may going to have surgery    • Colon polyps 2017   • Diverticulitis     h/o   • Diverticulosis    • Gout 2017   • History of kidney stones     x2   • Hypertension    • Hypertensive cardiovascular disease 2017   • Moderate obesity 2017   • Palpitations 2017   • Psoriasis 2017   • Skin cancer     basal from left arm    • Tinnitus     left    • Wears glasses    • Wears partial dentures     lower      Past Surgical History:   Procedure Laterality Date   • CARDIAC CATHETERIZATION     • COLONOSCOPY     • CYST REMOVAL      Cyst removed from spine, .   • INGUINAL HERNIA REPAIR Bilateral    • KNEE CARTILAGE SURGERY Right 2004    Right knee removal of cartilage, , Dr. Dick.     • SKIN CANCER EXCISION      basal removed from left forear    • TONSILLECTOMY     • WISDOM TOOTH EXTRACTION      all removed      Family History   Problem Relation Age of Onset   • Colon cancer Other       Social History     Socioeconomic History   • Marital status:      Spouse name: Not on file   • Number of children: Not on file   • Years of education: Not on file   • Highest education level: Not on file   Social Needs   • Financial resource strain: Not on file   • Food insecurity - worry: Not on file   • Food insecurity - inability: Not on file   • Transportation needs - medical: Not on file   • Transportation needs - non-medical: Not on file   Occupational History   • Not on file   Tobacco Use   • Smoking status: Former Smoker     Packs/day: 1.00     Years: 15.00     Pack years: 15.00     Types: Cigarettes     Last attempt to quit: 1965     Years since quittin.9   • Smokeless tobacco: Never Used   Substance and Sexual Activity   • Alcohol use: No   • Drug use: No   • Sexual activity: Defer   Other Topics Concern   • Not on file   Social History Narrative   • Not on file         Review of Systems   Constitutional: Negative.    HENT: Negative.    Eyes: Negative.    Respiratory: Negative.    Cardiovascular: Negative.    Gastrointestinal: Negative.    Endocrine: Negative.    Genitourinary: Negative.    Musculoskeletal: Positive for joint swelling.   Skin: Negative.    Allergic/Immunologic: Negative.    Neurological: Negative.    Hematological: Negative.    Psychiatric/Behavioral: Negative.        The following portions of the patient's history were reviewed and updated as appropriate: allergies, current medications, past family history, past medical history, past social history, past surgical history and problem list.      Objective      Physical Exam  There were no vitals taken for this visit.    There is no height or weight on file to calculate BMI.    Ortho Exam  Peripheral Vascular:    Upper Extremity:   Inspection:  Left--no cyanotic nail beds          Right--no cyanotic nail beds              Bilateral:  Pink nail beds with brisk capillary refill              Palpation:  Bilateral radial pulse normal     Musculoskeletal:        Lower Extremity:   Assistive Device Used for Ambulation: Walker     Inspection and Palpation:       Right knee:  Calf:  Soft and non tender  Effusion:  Mild  Pulses:  2+  Ecchymosis:  Positive  Warmth:  Appropriate  Incision:  Prineo in-place.  Positive mild bleeding from moncryl needle poke hole just inferior to incision.  Incision itself remains clean, dry and intact.  No incisional gapping or dehiscence.  No erythema but positive subcutaneous hematoma noted which is typical following TKA.       ROM:  Left:  Extension: 0°        Flexion: 80°        Deformities/Malalignments/Discrepancies:    Left:  none     Functional Testing:  Left:  Straight Leg Raise: 5/5      Assessment:  1. Status post right knee replacement        Plan:    1. Discussion was had with patient regarding exam, assessment and treatment options.  2. About 2 cm distal portion Prineo removed,  Mastisol applied and Steri-Strips were applied over poke-hole followed by 4x4s and Ace wrap as re-enforcement.  3. At this time incision looks good without evidence of infection and no antibiotics are required.  4. Continue current postoperative course prescribed by Dr. Pizarro.  No change to current rehabilitation course.  5. Already has follow-up scheduled for 19 December with Dr. Pizarro.  Patient is to keep this appointment.  7. Discussed earlier return precautions regarding incision dehiscence, infection, etc.  Patient verbalized understanding.  8. He'll continue with his walker for ambulation assistance.  9. Question and concerns answered.     Patient was examined by Dr. Lucas and he agrees with the above assessment and plan.      Isha Reaves PA-C  12/04/18  4:03 PM

## 2018-12-19 ENCOUNTER — OFFICE VISIT (OUTPATIENT)
Dept: ORTHOPEDIC SURGERY | Facility: CLINIC | Age: 83
End: 2018-12-19

## 2018-12-19 DIAGNOSIS — Z96.651 STATUS POST TOTAL KNEE REPLACEMENT, RIGHT: Primary | ICD-10-CM

## 2018-12-19 DIAGNOSIS — Z47.89 ORTHOPEDIC AFTERCARE: ICD-10-CM

## 2018-12-19 PROCEDURE — 99024 POSTOP FOLLOW-UP VISIT: CPT | Performed by: ORTHOPAEDIC SURGERY

## 2018-12-19 NOTE — PROGRESS NOTES
[facility] [place] [facility] [place]    Subjective     Chief Complaint   Patient presents with   • Right Knee - Post-op     S/P R total knee replacement-11/29/18        HPI    Raleigh Paredes is a 84 y.o. male.  He follows up today for his right total knee arthroplasty.  He is doing well, ambulating with the aid of a cane.  50% relief compared to his preoperative symptoms.  He is now 3 weeks out from surgery.  Wound is healing well, with no drainage.      Patient Active Problem List   Diagnosis   • Hypertensive cardiovascular disease   • Palpitations   • Gout   • Moderate obesity   • Psoriasis   • Colon polyps   • Primary osteoarthritis of right knee   • HTN (hypertension)   • Status post total right knee replacement   • DM (diabetes mellitus) (CMS/HCC)- new dx   • Leukocytosis, mild, likely reactive   • Acute postoperative pain     Past Medical History:   Diagnosis Date   • Arthritis     knees and hands    • Borderline diabetes    • Cataract     bilat - may going to have surgery    • Colon polyps 2/16/2017   • Diverticulitis     h/o   • Diverticulosis    • Gout 2/16/2017   • History of kidney stones     x2   • Hypertension    • Hypertensive cardiovascular disease 2/16/2017   • Moderate obesity 2/16/2017   • Palpitations 2/16/2017   • Psoriasis 2/16/2017   • Skin cancer     basal from left arm    • Tinnitus     left    • Wears glasses    • Wears partial dentures     lower       Past Surgical History:   Procedure Laterality Date   • CARDIAC CATHETERIZATION  2000   • COLONOSCOPY     • CYST REMOVAL  1978    Cyst removed from spine, 1978.   • INGUINAL HERNIA REPAIR Bilateral 2009   • KNEE CARTILAGE SURGERY Right 2004    Right knee removal of cartilage, 2004, Dr. Dick.     • SKIN CANCER EXCISION      basal removed from left forear    • TONSILLECTOMY     • TOTAL KNEE ARTHROPLASTY Right 11/29/2018    Procedure: TOTAL KNEE ARTHROPLASTY RIGHT;  Surgeon: Ned Pizarro MD;  Location: [facility];  Service: Orthopedics    • WISDOM TOOTH EXTRACTION      all removed       Family History   Problem Relation Age of Onset   • Colon cancer Other      Social History     Socioeconomic History   • Marital status:      Spouse name: Not on file   • Number of children: Not on file   • Years of education: Not on file   • Highest education level: Not on file   Social Needs   • Financial resource strain: Not on file   • Food insecurity - worry: Not on file   • Food insecurity - inability: Not on file   • Transportation needs - medical: Not on file   • Transportation needs - non-medical: Not on file   Occupational History   • Not on file   Tobacco Use   • Smoking status: Former Smoker     Packs/day: 1.00     Years: 15.00     Pack years: 15.00     Types: Cigarettes     Last attempt to quit: 1965     Years since quittin.0   • Smokeless tobacco: Never Used   Substance and Sexual Activity   • Alcohol use: No   • Drug use: No   • Sexual activity: Defer   Other Topics Concern   • Not on file   Social History Narrative   • Not on file      Current Outpatient Medications on File Prior to Visit   Medication Sig Dispense Refill   • allopurinol (ZYLOPRIM) 300 MG tablet Take 300 mg by mouth Daily.     • aspirin 325 MG EC tablet Take 1 tablet by mouth Daily. For 1 month 30 tablet 0   • docusate sodium 100 MG capsule Take 1 capsule by mouth 2 (Two) Times a Day As Needed (constipation). 60 each 0   • HYDROcodone-acetaminophen (NORCO) 7.5-325 MG per tablet Take 1 tablet by mouth Every 4 (Four) Hours As Needed for Moderate Pain  for up to 30 days. 40 tablet 0   • lisinopril-hydrochlorothiazide (PRINZIDE,ZESTORETIC) 20-12.5 MG per tablet Take 1 tablet by mouth Daily.     • metoprolol tartrate (LOPRESSOR) 25 MG tablet Take 25 mg by mouth 2 (Two) Times a Day.       No current facility-administered medications on file prior to visit.       Allergies   Allergen Reactions   • Penicillins Itching     Hand edema        Review of Systems     Objective       Physical Exam  There were no vitals taken for this visit.    There is no height or weight on file to calculate BMI.    General:   Mental Status:  Alert   Appearance: Cooperative, in no acute distress   Build and Nutrition: Well-nourished and well developed male   Orientation: Alert and oriented to person, place and time   Posture: Normal   Gait: With a cane    Integument:   Right knee: Wound is well-healed with no signs of infection    Lower Extremities:   Right Knee:    Tenderness:  None    Effusion:  None    Swelling: None    Crepitus:  None    Range of motion:  Extension: 10°       Flexion: 110°  Instability:  No varus laxity, no valgus laxity, negative anterior drawer  Deformities:  None    Imaging/Studies      Imaging Results (last 24 hours)     Procedure Component Value Units Date/Time    XR Knee 3+ View With [place] Right [[id number]] Resulted:  12/19/18 0911     Updated:  12/19/18 0911    Narrative:       Right Knee Radiographs  Indication: status-post right total knee arthroplasty  Views: AP, lateral, and sunrise views of the right knee    Comparison: no change compared to prior study, 11/29/2018    Findings:   The components are well aligned, with no signs of loosening or failure.          Assessment and Plan     Raleigh was seen today for post-op.    Diagnoses and all orders for this visit:    Status post total knee replacement, right  -     XR Knee 3+ View With [place] Right    Orthopedic aftercare        I reviewed my findings with patient today.  His right total knee arthroplasty is functioning well, and he is making improvements with therapy.  He will continue with his therapy, and I will see him back in 6 weeks.  I will see him back sooner for any problems.  No x-rays are required on the next visit.    Return in about 6 weeks (around 1/30/2019).      Medical Decision Making  Management Options : physical/occupational therapy  Data/Risk: radiology tests and independent visualization of imaging, lab  tests, or EMG/NCV      Ned Pizarro MD  12/19/18  9:28 AM

## 2019-01-02 ENCOUNTER — TELEPHONE (OUTPATIENT)
Dept: ORTHOPEDIC SURGERY | Facility: CLINIC | Age: 84
End: 2019-01-02

## 2019-01-02 ENCOUNTER — OFFICE VISIT (OUTPATIENT)
Dept: ORTHOPEDIC SURGERY | Facility: CLINIC | Age: 84
End: 2019-01-02

## 2019-01-02 DIAGNOSIS — Z96.651 STATUS POST TOTAL KNEE REPLACEMENT, RIGHT: Primary | ICD-10-CM

## 2019-01-02 PROCEDURE — 99024 POSTOP FOLLOW-UP VISIT: CPT | Performed by: PHYSICIAN ASSISTANT

## 2019-01-02 NOTE — PROGRESS NOTES
[facility] [place] [facility] [place]    Subjective     Patient: Raleigh Paredes  : 1934    Primary Care Provider: Raleigh Subramanian MD    Requesting Provider: As above    Follow-up of the Right Knee (2 week f/u/TOTAL KNEE ARTHROPLASTY RIGHT 18)      History    History of Present Illness: Patient returns today early with concerns for the proximal part of his incision.  He is status post right total knee arthroplasty with Dr. Pizarro in 18.  He has noticed a mild irritation and erythema at the most proximal aspect of his incision.  There is no drainage.  His knee pain continues to improve and he continues physical therapy.    Current Outpatient Medications on File Prior to Visit   Medication Sig Dispense Refill   • allopurinol (ZYLOPRIM) 300 MG tablet Take 300 mg by mouth Daily.     • aspirin 325 MG EC tablet Take 1 tablet by mouth Daily. For 1 month 30 tablet 0   • docusate sodium 100 MG capsule Take 1 capsule by mouth 2 (Two) Times a Day As Needed (constipation). 60 each 0   • lisinopril-hydrochlorothiazide (PRINZIDE,ZESTORETIC) 20-12.5 MG per tablet Take 1 tablet by mouth Daily.     • metoprolol tartrate (LOPRESSOR) 25 MG tablet Take 25 mg by mouth 2 (Two) Times a Day.       No current facility-administered medications on file prior to visit.       Allergies   Allergen Reactions   • Penicillins Itching     Hand edema      Past Medical History:   Diagnosis Date   • Arthritis     knees and hands    • Borderline diabetes    • Cataract     bilat - may going to have surgery    • Colon polyps 2017   • Diverticulitis     h/o   • Diverticulosis    • Gout 2017   • History of kidney stones     x2   • Hypertension    • Hypertensive cardiovascular disease 2017   • Moderate obesity 2017   • Palpitations 2017   • Psoriasis 2017   • Skin cancer     basal from left arm    • Tinnitus     left    • Wears glasses    • Wears partial dentures     lower      Past Surgical History:    Procedure Laterality Date   • CARDIAC CATHETERIZATION     • COLONOSCOPY     • CYST REMOVAL      Cyst removed from spine, .   • INGUINAL HERNIA REPAIR Bilateral 2009   • KNEE CARTILAGE SURGERY Right     Right knee removal of cartilage, , Dr. Dick.     • SKIN CANCER EXCISION      basal removed from left forear    • TONSILLECTOMY     • TOTAL KNEE ARTHROPLASTY Right 2018    Procedure: TOTAL KNEE ARTHROPLASTY RIGHT;  Surgeon: Ned Pizarro MD;  Location: [facility];  Service: Orthopedics   • WISDOM TOOTH EXTRACTION      all removed      Family History   Problem Relation Age of Onset   • Colon cancer Other       Social History     Socioeconomic History   • Marital status:      Spouse name: Not on file   • Number of children: Not on file   • Years of education: Not on file   • Highest education level: Not on file   Social Needs   • Financial resource strain: Not on file   • Food insecurity - worry: Not on file   • Food insecurity - inability: Not on file   • Transportation needs - medical: Not on file   • Transportation needs - non-medical: Not on file   Occupational History   • Not on file   Tobacco Use   • Smoking status: Former Smoker     Packs/day: 1.00     Years: 15.00     Pack years: 15.00     Types: Cigarettes     Last attempt to quit: 1965     Years since quittin.0   • Smokeless tobacco: Never Used   Substance and Sexual Activity   • Alcohol use: No   • Drug use: No   • Sexual activity: Defer   Other Topics Concern   • Not on file   Social History Narrative   • Not on file        Review of Systems    The following portions of the patient's history were reviewed and updated as appropriate: allergies, current medications, past family history, past medical history, past social history, past surgical history and problem list.      Objective      Physical Exam  There were no vitals taken for this visit.    There is no height or weight on file to calculate BMI.    Ortho  Exam  Right knee exam:  Incision is well healed with the exception of a small area at the proximal incision with mild erythema, likely from the monocryl knot  There is no open wound  ROM 5-95  Ligmaments stable      Medical Decision Making    Data Review:   none    Assessment:  1. Status post total knee replacement, right        Plan:  Doing well status post right total knee arthroplasty with likely irritation at the proximal incision from Monocryl knot.  I reassured the patient that I don't see any thing concerning and no sign of infection.  I recommend that he continue to watch it if he becomes more irritated, red, open he will return for further evaluation.  I explained that it is likely secondary to suture.  He will return as scheduled to see Dr. Pizarro at the end of January or sooner if needed.      Frida Briones PA-C  01/02/19  12:52 PM

## 2019-01-02 NOTE — TELEPHONE ENCOUNTER
I showed Frida the picture he sent. We believe it is an abscessed stitch but we are bringing him in today for her to look at it.  Christi

## 2019-01-02 NOTE — TELEPHONE ENCOUNTER
Patient had a total knee replacement on his right knee at the end of November.The son called with concerns about his father's incision appearing to be collecting some pus within a certain area. Also, the right foot/ankle area is looking a little swollen as well. He was last seen on 12/19/2018 by Dr. Pizarro. The son, Raleigh, can be reached at [phone].

## 2019-01-02 NOTE — TELEPHONE ENCOUNTER
He is 5 weeks s/p right total knee there is a spot above the knee in the incision looks like a pimple. He is going to send me a picture of the knee so I can show Dr. Falk since Dr. Pizarro is not here today.  Christi

## 2019-01-30 ENCOUNTER — OFFICE VISIT (OUTPATIENT)
Dept: ORTHOPEDIC SURGERY | Facility: CLINIC | Age: 84
End: 2019-01-30

## 2019-01-30 DIAGNOSIS — Z47.89 ORTHOPEDIC AFTERCARE: ICD-10-CM

## 2019-01-30 DIAGNOSIS — Z96.651 STATUS POST TOTAL KNEE REPLACEMENT, RIGHT: Primary | ICD-10-CM

## 2019-01-30 PROCEDURE — 99024 POSTOP FOLLOW-UP VISIT: CPT | Performed by: ORTHOPAEDIC SURGERY

## 2019-01-30 NOTE — PROGRESS NOTES
[facility] [place] [facility] [place]    Subjective     Chief Complaint   Patient presents with   • Post-op     9 weeks s/p TOTAL KNEE ARTHROPLASTY RIGHT 11/29/18        HPI    Raleigh Paredes is a 84 y.o. male.  He follows up today for his right total knee arthroplasty.  He is ambulatory without external aids, with minimal pain.  50% improvement compared to his preoperative symptoms.      Patient Active Problem List   Diagnosis   • Hypertensive cardiovascular disease   • Palpitations   • Gout   • Moderate obesity   • Psoriasis   • Colon polyps   • Primary osteoarthritis of right knee   • HTN (hypertension)   • Status post total right knee replacement   • DM (diabetes mellitus) (CMS/HCC)- new dx   • Leukocytosis, mild, likely reactive   • Acute postoperative pain     Past Medical History:   Diagnosis Date   • Arthritis     knees and hands    • Borderline diabetes    • Cataract     bilat - may going to have surgery    • Colon polyps 2/16/2017   • Diverticulitis     h/o   • Diverticulosis    • Gout 2/16/2017   • History of kidney stones     x2   • Hypertension    • Hypertensive cardiovascular disease 2/16/2017   • Moderate obesity 2/16/2017   • Palpitations 2/16/2017   • Psoriasis 2/16/2017   • Skin cancer     basal from left arm    • Tinnitus     left    • Wears glasses    • Wears partial dentures     lower       Past Surgical History:   Procedure Laterality Date   • CARDIAC CATHETERIZATION  2000   • COLONOSCOPY     • CYST REMOVAL  1978    Cyst removed from spine, 1978.   • INGUINAL HERNIA REPAIR Bilateral 2009   • KNEE CARTILAGE SURGERY Right 2004    Right knee removal of cartilage, 2004, Dr. Dick.     • SKIN CANCER EXCISION      basal removed from left forear    • TONSILLECTOMY     • TOTAL KNEE ARTHROPLASTY Right 11/29/2018    Procedure: TOTAL KNEE ARTHROPLASTY RIGHT;  Surgeon: Ned Pizarro MD;  Location: [facility];  Service: Orthopedics   • WISDOM TOOTH EXTRACTION      all removed       Family History    Problem Relation Age of Onset   • Colon cancer Other      Social History     Socioeconomic History   • Marital status:      Spouse name: Not on file   • Number of children: Not on file   • Years of education: Not on file   • Highest education level: Not on file   Social Needs   • Financial resource strain: Not on file   • Food insecurity - worry: Not on file   • Food insecurity - inability: Not on file   • Transportation needs - medical: Not on file   • Transportation needs - non-medical: Not on file   Occupational History   • Not on file   Tobacco Use   • Smoking status: Former Smoker     Packs/day: 1.00     Years: 15.00     Pack years: 15.00     Types: Cigarettes     Last attempt to quit: 1965     Years since quittin.1   • Smokeless tobacco: Never Used   Substance and Sexual Activity   • Alcohol use: No   • Drug use: No   • Sexual activity: Defer   Other Topics Concern   • Not on file   Social History Narrative   • Not on file      Current Outpatient Medications on File Prior to Visit   Medication Sig Dispense Refill   • allopurinol (ZYLOPRIM) 300 MG tablet Take 300 mg by mouth Daily.     • aspirin 325 MG EC tablet Take 1 tablet by mouth Daily. For 1 month 30 tablet 0   • docusate sodium 100 MG capsule Take 1 capsule by mouth 2 (Two) Times a Day As Needed (constipation). 60 each 0   • lisinopril-hydrochlorothiazide (PRINZIDE,ZESTORETIC) 20-12.5 MG per tablet Take 1 tablet by mouth Daily.     • metoprolol tartrate (LOPRESSOR) 25 MG tablet Take 25 mg by mouth 2 (Two) Times a Day.       No current facility-administered medications on file prior to visit.       Allergies   Allergen Reactions   • Penicillins Itching     Hand edema        Review of Systems   Constitutional: Negative for activity change, appetite change, chills, diaphoresis, fatigue, fever and unexpected weight change.   HENT: Negative for congestion, dental problem, drooling, ear discharge, ear pain, facial swelling, hearing loss,  mouth sores, nosebleeds, postnasal drip, rhinorrhea, sinus pressure, sneezing, sore throat, tinnitus, trouble swallowing and voice change.    Eyes: Negative for photophobia, pain, discharge, redness, itching and visual disturbance.   Respiratory: Negative for apnea, cough, choking, chest tightness, shortness of breath, wheezing and stridor.    Cardiovascular: Negative for chest pain, palpitations and leg swelling.   Gastrointestinal: Negative for abdominal distention, abdominal pain, anal bleeding, blood in stool, constipation, diarrhea, nausea, rectal pain and vomiting.   Endocrine: Negative for cold intolerance, heat intolerance, polydipsia, polyphagia and polyuria.   Genitourinary: Negative for decreased urine volume, difficulty urinating, dysuria, enuresis, flank pain, frequency, genital sores, hematuria and urgency.   Musculoskeletal: Positive for arthralgias and joint swelling. Negative for back pain, gait problem, myalgias, neck pain and neck stiffness.   Skin: Negative for color change, pallor, rash and wound.   Allergic/Immunologic: Negative for environmental allergies, food allergies and immunocompromised state.   Neurological: Negative for dizziness, tremors, seizures, syncope, facial asymmetry, speech difficulty, weakness, light-headedness, numbness and headaches.   Hematological: Negative for adenopathy. Does not bruise/bleed easily.   Psychiatric/Behavioral: Negative for agitation, behavioral problems, confusion, decreased concentration, dysphoric mood, hallucinations, self-injury, sleep disturbance and suicidal ideas. The patient is not nervous/anxious and is not hyperactive.         Objective      Physical Exam  There were no vitals taken for this visit.    There is no height or weight on file to calculate BMI.    General:   Mental Status:  Alert   Appearance: Cooperative, in no acute distress   Build and Nutrition: Well-nourished and well developed male   Orientation: Alert and oriented to person,  place and time   Posture: Normal   Gait: Normal    Integument:   Right knee: Wound is well-healed with no signs of infection    Lower Extremities:   Right Knee:    Tenderness:  None    Effusion:  None    Swelling: None    Crepitus:  None    Range of motion:  Extension: 5°       Flexion: 120°  Instability:  No varus laxity, no valgus laxity, negative anterior drawer  Deformities:  None      Assessment and Plan     Raleigh was seen today for post-op.    Diagnoses and all orders for this visit:    Status post total knee replacement, right    Orthopedic aftercare        I reviewed my findings with patient today.  His right total knee arthroplasty is functioning well, and he is pleased with results of far.  I will see him back in 4 months with an x-ray, which will be a 6 month checkup.  I will see him back sooner for any problems.    Return in about 4 months (around 5/30/2019) for Recheck with X-Rays.        Ned Pizarro MD  01/30/19  8:48 AM

## 2019-05-29 ENCOUNTER — OFFICE VISIT (OUTPATIENT)
Dept: ORTHOPEDIC SURGERY | Facility: CLINIC | Age: 84
End: 2019-05-29

## 2019-05-29 VITALS — BODY MASS INDEX: 34.49 KG/M2 | WEIGHT: 194.67 LBS | HEIGHT: 63 IN | OXYGEN SATURATION: 98 % | HEART RATE: 64 BPM

## 2019-05-29 DIAGNOSIS — Z09 POSTOPERATIVE EXAMINATION: ICD-10-CM

## 2019-05-29 DIAGNOSIS — Z96.651 STATUS POST TOTAL KNEE REPLACEMENT, RIGHT: Primary | ICD-10-CM

## 2019-05-29 PROCEDURE — 99213 OFFICE O/P EST LOW 20 MIN: CPT | Performed by: ORTHOPAEDIC SURGERY

## 2019-05-29 RX ORDER — GABAPENTIN 300 MG/1
300 CAPSULE ORAL NIGHTLY
COMMUNITY
Start: 2019-03-15 | End: 2022-03-14 | Stop reason: SDUPTHER

## 2019-05-29 RX ORDER — GLIPIZIDE 5 MG/1
5 TABLET ORAL DAILY
COMMUNITY
Start: 2019-04-30

## 2019-05-29 NOTE — PROGRESS NOTES
[facility]    Subjective     Chief Complaint   Patient presents with   • Follow-up     4 MONTHS- 6 MONTHS S/P (R) TKA 11/29/2018        HPI    Raleigh Paredes is a 84 y.o. male.  He follows up today for his right total knee arthroplasty.  He is doing well, with no pain.  100% improvement compared to his preoperative symptoms.  Fully ambulatory without external aids.      Patient Active Problem List   Diagnosis   • Hypertensive cardiovascular disease   • Palpitations   • Gout   • Moderate obesity   • Psoriasis   • Colon polyps   • Primary osteoarthritis of right knee   • HTN (hypertension)   • Status post total right knee replacement   • DM (diabetes mellitus) (CMS/[facility])- new dx   • Leukocytosis, mild, likely reactive   • Acute postoperative pain     Past Medical History:   Diagnosis Date   • Arthritis     knees and hands    • Borderline diabetes    • Cataract     bilat - may going to have surgery    • Colon polyps 2/16/2017   • Diverticulitis     h/o   • Diverticulosis    • Gout 2/16/2017   • History of kidney stones     x2   • Hypertension    • Hypertensive cardiovascular disease 2/16/2017   • Moderate obesity 2/16/2017   • Palpitations 2/16/2017   • Psoriasis 2/16/2017   • Skin cancer     basal from left arm    • Tinnitus     left    • Wears glasses    • Wears partial dentures     lower       Past Surgical History:   Procedure Laterality Date   • CARDIAC CATHETERIZATION  2000   • COLONOSCOPY     • CYST REMOVAL  1978    Cyst removed from spine, 1978.   • INGUINAL HERNIA REPAIR Bilateral 2009   • KNEE CARTILAGE SURGERY Right 2004    Right knee removal of cartilage, 2004, Dr. Dick.     • SKIN CANCER EXCISION      basal removed from left forear    • TONSILLECTOMY     • TOTAL KNEE ARTHROPLASTY Right 11/29/2018    Procedure: TOTAL KNEE ARTHROPLASTY RIGHT;  Surgeon: Ned Pizarro MD;  Location: [facility];  Service: Orthopedics   • WISDOM TOOTH EXTRACTION      all removed       Family  History   Problem Relation Age of Onset   • Colon cancer Other      Social History     Socioeconomic History   • Marital status:      Spouse name: Not on file   • Number of children: Not on file   • Years of education: Not on file   • Highest education level: Not on file   Tobacco Use   • Smoking status: Former Smoker     Packs/day: 1.00     Years: 15.00     Pack years: 15.00     Types: Cigarettes     Last attempt to quit: 1965     Years since quittin.4   • Smokeless tobacco: Never Used   Substance and Sexual Activity   • Alcohol use: No   • Drug use: No   • Sexual activity: Defer      Current Outpatient Medications on File Prior to Visit   Medication Sig Dispense Refill   • allopurinol (ZYLOPRIM) 300 MG tablet Take 300 mg by mouth Daily.     • gabapentin (NEURONTIN) 300 MG capsule      • glipiZIDE (GLUCOTROL) 5 MG tablet      • lisinopril-hydrochlorothiazide (PRINZIDE,ZESTORETIC) 20-12.5 MG per tablet Take 1 tablet by mouth Daily.     • metoprolol tartrate (LOPRESSOR) 25 MG tablet Take 25 mg by mouth 2 (Two) Times a Day.     • [DISCONTINUED] aspirin 325 MG EC tablet Take 1 tablet by mouth Daily. For 1 month 30 tablet 0   • [DISCONTINUED] docusate sodium 100 MG capsule Take 1 capsule by mouth 2 (Two) Times a Day As Needed (constipation). 60 each 0     No current facility-administered medications on file prior to visit.       Allergies   Allergen Reactions   • Penicillins Itching     Hand edema        Review of Systems   Constitutional: Negative for activity change, appetite change, chills, diaphoresis, fatigue, fever and unexpected weight change.   HENT: Positive for hearing loss. Negative for congestion, dental problem, drooling, ear discharge, ear pain, facial swelling, mouth sores, nosebleeds, postnasal drip, rhinorrhea, sinus pressure, sneezing, sore throat, tinnitus, trouble swallowing and voice change.    Eyes: Negative for photophobia, pain, discharge, redness, itching and visual disturbance.  "  Respiratory: Negative for apnea, cough, choking, chest tightness, shortness of breath, wheezing and stridor.    Cardiovascular: Negative for chest pain, palpitations and leg swelling.   Gastrointestinal: Negative for abdominal distention, abdominal pain, anal bleeding, blood in stool, constipation, diarrhea, nausea, rectal pain and vomiting.   Endocrine: Negative for cold intolerance, heat intolerance, polydipsia, polyphagia and polyuria.   Genitourinary: Negative for decreased urine volume, difficulty urinating, dysuria, enuresis, flank pain, frequency, genital sores, hematuria and urgency.   Musculoskeletal: Positive for arthralgias. Negative for back pain, gait problem, joint swelling, myalgias, neck pain and neck stiffness.   Skin: Negative for color change, pallor, rash and wound.   Allergic/Immunologic: Negative for environmental allergies, food allergies and immunocompromised state.   Neurological: Negative for dizziness, tremors, seizures, syncope, facial asymmetry, speech difficulty, weakness, light-headedness, numbness and headaches.   Hematological: Negative for adenopathy. Does not bruise/bleed easily.   Psychiatric/Behavioral: Negative for agitation, behavioral problems, confusion, decreased concentration, dysphoric mood, hallucinations, self-injury, sleep disturbance and suicidal ideas. The patient is not nervous/anxious and is not hyperactive.         Objective      Physical Exam  Pulse 64   Ht 160 cm (62.99\")   Wt 88.3 kg (194 lb 10.7 oz)   SpO2 98%   BMI 34.49 kg/m²     Body mass index is 34.49 kg/m².    General:   Mental Status:  Alert   Appearance: Cooperative, in no acute distress   Build and Nutrition: Well-nourished well-developed male   Orientation: Alert and oriented to person, place and time   Posture: Normal   Gait: Normal    Integument:   Right knee: Wound is well-healed with no signs of infection    Lower Extremities:   Right Knee:    Tenderness:  None    Effusion: "  None    Swelling: None    Crepitus:  None    Range of motion:  Extension: 0°       Flexion: 130°  Instability:  No varus laxity, no valgus laxity, negative anterior drawer  Deformities:  None      Imaging/Studies      Imaging Results (last 24 hours)     Procedure Component Value Units Date/Time    XR Knee 3+ View With [place]alla Right [[id number]] Resulted:  05/29/19 0902     Updated:  05/29/19 0902    Narrative:       Right Knee Radiographs  Indication: status-post right total knee arthroplasty  Views: AP, lateral, and sunrise views of the right knee    Comparison: no change compared to prior study, 12/19/2018    Findings:   The components are well aligned, with no signs of loosening or failure.          Assessment and Plan     Raleigh was seen today for follow-up.    Diagnoses and all orders for this visit:    Status post total knee replacement, right  -     XR Knee 3+ View With Zavalla Right    Postoperative examination        1. Status post total knee replacement, right    2. Postoperative examination        I reviewed my findings with patient today.  His right total knee arthroplasty is functioning well, and I will see him back in 6 months for a one-year checkup, but sooner for any problems.  Is pleased with results.    Return in about 6 months (around 11/29/2019) for Recheck with X-Rays.      Medical Decision Making  Data/Risk: radiology tests and independent visualization of imaging, lab tests, or EMG/NCV      Nde Pizarro MD  05/29/19  9:06 AM

## 2019-09-16 ENCOUNTER — TELEPHONE (OUTPATIENT)
Dept: ORTHOPEDIC SURGERY | Facility: CLINIC | Age: 84
End: 2019-09-16

## 2019-09-16 NOTE — TELEPHONE ENCOUNTER
I spoke with the patient's son and he mentioned that his father had to get stitches in his arm due to his fall. He mentioned that the hospital did not give him an antibiotic. I advised him that unless he has any sign of infection then he does not need an antibiotic.     Kyara

## 2019-09-16 NOTE — TELEPHONE ENCOUNTER
PATIENT'S SON CALLED AND STATED THAT HIS FATHER HAD FELL AND GOT A CUT ON HIS ARM THAT REQUIRED STITCHES. DR. CHILDERS DID A TOTAL KNEE REPLACEMENT IN December AND HE IS WONDERING IF HE NEEDS TO BE TAKING ANY ANTIBIOTICS TO MAKE SURE THAT IT DOES NOT GET INFECTED. PLEASE ADVISE

## 2019-12-09 ENCOUNTER — OFFICE VISIT (OUTPATIENT)
Dept: ORTHOPEDIC SURGERY | Facility: CLINIC | Age: 84
End: 2019-12-09

## 2019-12-09 VITALS — OXYGEN SATURATION: 97 % | HEIGHT: 63 IN | WEIGHT: 198 LBS | BODY MASS INDEX: 35.08 KG/M2 | HEART RATE: 74 BPM

## 2019-12-09 DIAGNOSIS — Z09 POSTOPERATIVE EXAMINATION: ICD-10-CM

## 2019-12-09 DIAGNOSIS — Z96.651 STATUS POST TOTAL KNEE REPLACEMENT, RIGHT: Primary | ICD-10-CM

## 2019-12-09 PROCEDURE — 99213 OFFICE O/P EST LOW 20 MIN: CPT | Performed by: ORTHOPAEDIC SURGERY

## 2019-12-09 ASSESSMENT — KOOS JR
KOOS JR SCORE: 1
KOOS JR SCORE: 91.975

## 2019-12-09 NOTE — PROGRESS NOTES
[facility]    Subjective     Chief Complaint   Patient presents with   • Follow-up     1 year status post (R) TKA 11/29/2018        HPI    Raleigh Paredes is a 85 y.o. male.  He follows up today for his right total knee arthroplasty.  He is doing well, and is pleased with results.  100% improvement compared to his preoperative symptoms.  Fully ambulatory without external aids.      Patient Active Problem List   Diagnosis   • Hypertensive cardiovascular disease   • Palpitations   • Gout   • Moderate obesity   • Psoriasis   • Colon polyps   • Primary osteoarthritis of right knee   • HTN (hypertension)   • Status post total right knee replacement   • DM (diabetes mellitus) (CMS/[facility])- new dx   • Leukocytosis, mild, likely reactive   • Acute postoperative pain     Past Medical History:   Diagnosis Date   • Arthritis     knees and hands    • Borderline diabetes    • Cataract     bilat - may going to have surgery    • Colon polyps 2/16/2017   • Diverticulitis     h/o   • Diverticulosis    • Gout 2/16/2017   • History of kidney stones     x2   • Hypertension    • Hypertensive cardiovascular disease 2/16/2017   • Moderate obesity 2/16/2017   • Palpitations 2/16/2017   • Psoriasis 2/16/2017   • Skin cancer     basal from left arm    • Tinnitus     left    • Wears glasses    • Wears partial dentures     lower       Past Surgical History:   Procedure Laterality Date   • CARDIAC CATHETERIZATION  2000   • COLONOSCOPY     • CYST REMOVAL  1978    Cyst removed from spine, 1978.   • INGUINAL HERNIA REPAIR Bilateral 2009   • KNEE CARTILAGE SURGERY Right 2004    Right knee removal of cartilage, 2004, Dr. Dick.     • SKIN CANCER EXCISION      basal removed from left forear    • TONSILLECTOMY     • TOTAL KNEE ARTHROPLASTY Right 11/29/2018    Procedure: TOTAL KNEE ARTHROPLASTY RIGHT;  Surgeon: Ned Pizarro MD;  Location: [facility];  Service: Orthopedics   • WISDOM TOOTH EXTRACTION      all removed        Family History   Problem Relation Age of Onset   • Colon cancer Other      Social History     Socioeconomic History   • Marital status:      Spouse name: Not on file   • Number of children: Not on file   • Years of education: Not on file   • Highest education level: Not on file   Tobacco Use   • Smoking status: Former Smoker     Packs/day: 1.00     Years: 15.00     Pack years: 15.00     Types: Cigarettes     Last attempt to quit: 1965     Years since quittin.9   • Smokeless tobacco: Never Used   Substance and Sexual Activity   • Alcohol use: No   • Drug use: No   • Sexual activity: Defer      Current Outpatient Medications on File Prior to Visit   Medication Sig Dispense Refill   • allopurinol (ZYLOPRIM) 300 MG tablet Take 300 mg by mouth Daily.     • gabapentin (NEURONTIN) 300 MG capsule      • glipiZIDE (GLUCOTROL) 5 MG tablet      • lisinopril-hydrochlorothiazide (PRINZIDE,ZESTORETIC) 20-12.5 MG per tablet Take 1 tablet by mouth Daily.     • metoprolol tartrate (LOPRESSOR) 25 MG tablet Take 25 mg by mouth 2 (Two) Times a Day.       No current facility-administered medications on file prior to visit.       Allergies   Allergen Reactions   • Penicillins Itching     Hand edema        Review of Systems   Constitutional: Negative for activity change, appetite change, chills, diaphoresis, fatigue, fever and unexpected weight change.   HENT: Positive for hearing loss. Negative for congestion, dental problem, drooling, ear discharge, ear pain, facial swelling, mouth sores, nosebleeds, postnasal drip, rhinorrhea, sinus pressure, sneezing, sore throat, tinnitus, trouble swallowing and voice change.    Eyes: Negative for photophobia, pain, discharge, redness, itching and visual disturbance.   Respiratory: Negative for apnea, cough, choking, chest tightness, shortness of breath, wheezing and stridor.    Cardiovascular: Negative for chest pain, palpitations and leg swelling.   Gastrointestinal: Negative for  "abdominal distention, abdominal pain, anal bleeding, blood in stool, constipation, diarrhea, nausea, rectal pain and vomiting.   Endocrine: Negative for cold intolerance, heat intolerance, polydipsia, polyphagia and polyuria.   Genitourinary: Negative for decreased urine volume, difficulty urinating, dysuria, enuresis, flank pain, frequency, genital sores, hematuria and urgency.   Musculoskeletal: Negative for arthralgias, back pain, gait problem, joint swelling, myalgias, neck pain and neck stiffness.        Patient has no complaints at this time      Skin: Negative for color change, pallor, rash and wound.   Allergic/Immunologic: Negative for environmental allergies, food allergies and immunocompromised state.   Neurological: Positive for tremors. Negative for dizziness, seizures, syncope, facial asymmetry, speech difficulty, weakness, light-headedness, numbness and headaches.   Hematological: Negative for adenopathy. Does not bruise/bleed easily.   Psychiatric/Behavioral: Negative for agitation, behavioral problems, confusion, decreased concentration, dysphoric mood, hallucinations, self-injury, sleep disturbance and suicidal ideas. The patient is not nervous/anxious and is not hyperactive.         Objective      Physical Exam  Pulse 74   Ht 160 cm (62.99\")   Wt 89.8 kg (198 lb)   SpO2 97%   BMI 35.08 kg/m²     Body mass index is 35.08 kg/m².    General:   Mental Status:  Alert   Appearance: Cooperative, in no acute distress   Build and Nutrition: Well-nourished well-developed male   Orientation: Alert and oriented to person, place and time   Posture: Normal   Gait: Normal    Integument:   Right knee: Wound is well-healed with no signs of infection    Lower Extremities:   Right Knee:    Tenderness:  None    Effusion:  None    Swelling: None    Crepitus:  None    Range of motion:  Extension: 0°       Flexion: 130°  Instability:  No varus laxity, no valgus laxity, negative anterior drawer  Deformities: "  None      Imaging/Studies  Imaging Results (Last 24 Hours)     Procedure Component Value Units Date/Time    XR Knee 3+ View With [place] Right [[id number]] Resulted:  12/09/19 1020     Updated:  12/09/19 1020    Narrative:       Right Knee Radiographs  Indication: status-post right total knee arthroplasty  Views: AP, lateral, and sunrise views of the right knee    Comparison: no change compared to prior study, 5/29/2019    Findings:   The components are well aligned, with no signs of loosening or failure.            Assessment and Plan     Raleigh was seen today for follow-up.    Diagnoses and all orders for this visit:    Status post total knee replacement, right  -     XR Knee 3+ View With Knapp Right    Postoperative examination        1. Status post total knee replacement, right    2. Postoperative examination        I reviewed my findings with the patient today.  His right total knee arthroplasty is functioning well, and he is pleased with results.  I will see him back in 4 years, which will be a 5-year checkup, but sooner for any problems.    Return in about 4 years (around 12/9/2023) for Recheck with X-Rays.      Medical Decision Making  Data/Risk: radiology tests and independent visualization of imaging, lab tests, or EMG/NCV      Ned Pizarro MD  12/09/19  10:25 AM

## 2020-04-23 ENCOUNTER — TELEPHONE (OUTPATIENT)
Dept: ORTHOPEDIC SURGERY | Facility: CLINIC | Age: 85
End: 2020-04-23

## 2020-04-23 NOTE — TELEPHONE ENCOUNTER
Received urgent referral from [facility] for this patient. Sent to Dr. Pizarro to review since it was for him. He indicated if the patient hadn't responded to oral antibiotics they should go to the ER. I contacted [facility] to let them know and passed the message on to Mirna his assistant. She said she would let Dr. Barr know.

## 2020-04-24 ENCOUNTER — HOSPITAL ENCOUNTER (OUTPATIENT)
Dept: GENERAL RADIOLOGY | Facility: HOSPITAL | Age: 85
Discharge: HOME | End: 2020-04-24
Admitting: INTERNAL MEDICINE

## 2020-04-24 ENCOUNTER — TRANSCRIBE ORDERS (OUTPATIENT)
Dept: ADMINISTRATIVE | Facility: HOSPITAL | Age: 85
End: 2020-04-24

## 2020-04-24 DIAGNOSIS — T14.8XXA PUNCTURE WOUND: Primary | ICD-10-CM

## 2020-04-24 DIAGNOSIS — T14.8XXA PUNCTURE WOUND: ICD-10-CM

## 2020-04-24 PROCEDURE — 73630 X-RAY EXAM OF FOOT: CPT

## 2020-04-27 NOTE — TELEPHONE ENCOUNTER
Referral department from Dr. Saavedra's office called asking about this referral. I let her know I had called their office back on Thursday and spoke to Mirna to let her know that patient was to go to the ER due to no improvement with antibiotics. She said no one had let her know and she would make note of it.

## 2020-09-28 ENCOUNTER — OFFICE VISIT (OUTPATIENT)
Dept: NEUROLOGY | Facility: CLINIC | Age: 85
End: 2020-09-28

## 2020-09-28 VITALS
HEIGHT: 62 IN | DIASTOLIC BLOOD PRESSURE: 70 MMHG | OXYGEN SATURATION: 98 % | SYSTOLIC BLOOD PRESSURE: 120 MMHG | HEART RATE: 50 BPM | BODY MASS INDEX: 36.99 KG/M2 | TEMPERATURE: 96.8 F | WEIGHT: 201 LBS

## 2020-09-28 DIAGNOSIS — G25.2 RESTING TREMOR: Primary | ICD-10-CM

## 2020-09-28 PROCEDURE — 99203 OFFICE O/P NEW LOW 30 MIN: CPT | Performed by: NURSE PRACTITIONER

## 2020-09-28 RX ORDER — PROPRANOLOL HYDROCHLORIDE 10 MG/1
10 TABLET ORAL 3 TIMES DAILY
COMMUNITY
Start: 2020-06-10 | End: 2021-02-16

## 2020-09-28 NOTE — TELEPHONE ENCOUNTER
Please call pharmacy, pt and wife wanted a short term one month rx to try prior to obtaining 90 day supply

## 2020-09-28 NOTE — PROGRESS NOTES
Subjective:     Patient ID: Raleigh Paerdes is a 85 y.o. male.    CC:   Chief Complaint   Patient presents with   • Tremors       HPI:   History of Present Illness     Mr. Paredes is a very pleasant 85-year-old male here today with his daughter for evaluation of tremor.  He tells me he first noticed a tremor in his right arm about 2 years ago, tremor seems to be bothersome and that it keeps him from eating well and also he has noticed a significant problem with handwriting.  He feels like his words and letters are becoming much smaller.  He is also noticed a tremor of his lower jaw  In the past year as well.  He seems to think this is progressing in intensity.  He has primary care put him on propranolol about a year ago, he is currently only taking 10 mg once a day, he is noticed no improvement.  He also takes gabapentin 300 mg a day once at night for peripheral neuropathy, this has not helped his tremor.  He has trouble getting caught up in his feet at times but he denies shuffled gait.  He does complain of right shoulder pain although he reports that this pain has been present for many years, he had a rotator cuff injury that he did not have surgically repaired.  He denies any dysphasia or REM sleep disorder symptoms.  He denies loss of smell.  He feels a bit stiff overall.  He has constipation off and on, he at times has trouble controlling his bladder.  He denies any dizziness upon standing or orthostatic instability  No family history of tremor or Parkinson's disease.  He also denies hallucinations or problems with his memory.  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    Past Medical History:   Diagnosis Date   • Arthritis     knees and  hands    • Borderline diabetes    • Cataract     bilat - may going to have surgery    • Colon polyps 2017   • Diverticulitis     h/o   • Diverticulosis    • Gout 2017   • History of kidney stones     x2   • Hypertension    • Hypertensive cardiovascular disease 2017   • Moderate obesity 2017   • Palpitations 2017   • Psoriasis 2017   • Skin cancer     basal from left arm    • Tinnitus     left    • Wears glasses    • Wears partial dentures     lower        Past Surgical History:   Procedure Laterality Date   • CARDIAC CATHETERIZATION     • COLONOSCOPY     • CYST REMOVAL      Cyst removed from spine, .   • INGUINAL HERNIA REPAIR Bilateral    • KNEE CARTILAGE SURGERY Right     Right knee removal of cartilage, , Dr. Dick.     • SKIN CANCER EXCISION      basal removed from left forear    • TONSILLECTOMY     • TOTAL KNEE ARTHROPLASTY Right 2018    Procedure: TOTAL KNEE ARTHROPLASTY RIGHT;  Surgeon: Ned Pizarro MD;  Location: [facility];  Service: Orthopedics   • WISDOM TOOTH EXTRACTION      all removed        Social History     Socioeconomic History   • Marital status:      Spouse name: Not on file   • Number of children: Not on file   • Years of education: Not on file   • Highest education level: Not on file   Tobacco Use   • Smoking status: Former Smoker     Packs/day: 1.00     Years: 15.00     Pack years: 15.00     Types: Cigarettes     Quit date: 1965     Years since quittin.7   • Smokeless tobacco: Never Used   Substance and Sexual Activity   • Alcohol use: No   • Drug use: No   • Sexual activity: Defer       Family History   Problem Relation Age of Onset   • Colon cancer Other    • Heart failure Mother    • Colon cancer Father    • Heart failure Father    • Cancer Sister    • Stroke Sister    • Heart attack Brother         Review of Systems   Constitutional: Negative.    HENT: Negative.    Eyes: Negative.    Respiratory: Negative.   "  Cardiovascular: Negative.    Gastrointestinal: Negative.  Negative for abdominal distention.   Endocrine: Negative.    Genitourinary: Negative.    Musculoskeletal: Negative.    Skin: Negative.    Allergic/Immunologic: Negative.    Neurological: Positive for tremors and numbness ( In his feet from what he was told was diabetic neuropathy, stable on gabapentin). Negative for dizziness, seizures, syncope, facial asymmetry, speech difficulty, weakness, light-headedness and headaches.   Hematological: Negative.    Psychiatric/Behavioral: Negative.         Objective:  /70   Pulse 50   Temp 96.8 °F (36 °C)   Ht 157.5 cm (62\")   Wt 91.2 kg (201 lb)   SpO2 98%   BMI 36.76 kg/m²     Neurologic Exam     Mental Status   Oriented to person, place, and time.   Attention: normal. Concentration: normal.   Speech: speech is normal   Level of consciousness: alert  Knowledge: consistent with education.   Able to read. Able to write. Normal comprehension.     Cranial Nerves   Cranial nerves II through XII intact.     CN II   Visual fields full to confrontation.   Right visual field deficit: none  Left visual field deficit: none     CN III, IV, VI   Pupils are equal, round, and reactive to light.  Extraocular motions are normal.   Right pupil: Shape: regular. Reactivity: brisk. Consensual response: intact. Accommodation: intact.   Left pupil: Shape: regular. Reactivity: brisk. Consensual response: intact. Accommodation: intact.   CN III: no CN III palsy  CN VI: no CN VI palsy  Nystagmus: none   Upgaze: normal  Downgaze: normal    CN V   Facial sensation intact.     CN VII   Facial expression full, symmetric.     CN VIII   CN VIII normal.     CN IX, X   CN IX normal.   CN X normal.     CN XI   CN XI normal.     CN XII   CN XII normal.     Motor Exam   Right arm pronator drift: absent    Strength   Strength 5/5 throughout. He has no rigidity noted, possibly mild bradykinesia on the right with finger tapping     Sensory Exam "   Light touch normal.     Gait, Coordination, and Reflexes     Coordination   Romberg: negative  Finger to nose coordination: normal  Heel to shin coordination: normal  Tandem walking coordination: normal    Tremor   Resting tremor: absent  Intention tremor: absent  Action tremor: absent    Reflexes   Right Virgen: absent  Left Virgen: absentHe has no shuffled gait however he does have decreased arm swing in the right arm with ambulation, pill-rolling type tremor noted in the right hand with ambulation  He also has a pill-rolling type tremor at rest on the right upper extremity, mild action tremor as well  Lower jaw tremor  Reflexes 1+ throughout       Physical Exam  Vitals signs reviewed.   Constitutional:       General: He is not in acute distress.     Appearance: He is well-developed. He is obese. He is not toxic-appearing.   HENT:      Head: Normocephalic and atraumatic.      Mouth/Throat:      Mouth: Mucous membranes are moist.   Eyes:      General: No scleral icterus.     Extraocular Movements: EOM normal.      Conjunctiva/sclera: Conjunctivae normal.      Pupils: Pupils are equal, round, and reactive to light.   Neck:      Musculoskeletal: Normal range of motion and neck supple.   Pulmonary:      Effort: Pulmonary effort is normal. No respiratory distress.   Skin:     General: Skin is warm.      Capillary Refill: Capillary refill takes less than 2 seconds.   Neurological:      Mental Status: He is alert and oriented to person, place, and time.      Coordination: Finger-Nose-Finger Test, Heel to Shin Test and Romberg Test normal.      Gait: Tandem walk normal.      Deep Tendon Reflexes: Strength normal.   Psychiatric:         Mood and Affect: Mood normal.         Speech: Speech normal.         Behavior: Behavior normal.         Thought Content: Thought content normal.         Judgment: Judgment normal.         Assessment/Plan:       Raleigh was seen today for tremors.    Diagnoses and all orders for this  visit:    Resting tremor    Other orders  -     carbidopa-levodopa (SINEMET)  MG per tablet; Take 1 tablet by mouth 2 (two) times a day.    Mr. Paredes has both resting and action tremor of the right upper extremity as well as jaw.  His resting tremor does have a pill-rolling appearance, he has decreased arm swing with pill-rolling while ambulating, no shuffle gait no rigidity.   mild bradykinesia with finger tap of the right upper extremity.  He has been on low-dose propranolol which has been ineffective, I would be hesitant to increase the dose due to his heart rate today 50.  He is also on gabapentin 300 mg once a day which has not helped tremor.  Certainly these medications have not been optimized however he has masked facies as well as resting tremor.  We discussed that there is a possibility he could have Parkinson's disease.  We discussed monitoring symptoms, obtaining DaTscan or trialing low-dose Sinemet.  Patient wishes to trial Sinemet today, counseled on risk of side effects.  We did discuss that response to this medication could be indicative of Parkinson's disease.  Should they change their mind and want DaTscan performed I will be happy to order this at   I would like to see him back in about 3 to 4 weeks on a day that Dr. Hurst is here for short-term to see how he is doing, he is counseled on possible side effects of Sinemet, stop with any adverse side effects and notify my office           Reviewed medications, potential side effects and signs and symptoms to report. Discussed risk versus benefits of treatment plan with patient and/or family-including medications, labs and radiology that may be ordered. Addressed questions and concerns during visit. Patient and/or family verbalized understanding and agree with plan.    AS THE PROVIDER, I PERSONALLY WORE PPE DURING ENTIRE FACE TO FACE ENCOUNTER IN CLINIC WITH THE PATIENT. PATIENT ALSO WORE PPE DURING ENTIRE FACE TO FACE ENCOUNTER EXCEPT FOR  A MAX OF 30 SECONDS DURING NEUROLOGICAL EVALUATION OF CRANIAL NERVES AND THEN MASK WAS PLACED BACK OVER PATIENT FACE FOR REMAINDER OF VISIT. I WASHED MY HANDS BEFORE AND AFTER VISIT.        Jemima Valdez, APRN  9/28/2020

## 2020-10-27 ENCOUNTER — LAB REQUISITION (OUTPATIENT)
Dept: LAB | Facility: HOSPITAL | Age: 85
End: 2020-10-27

## 2020-10-27 ENCOUNTER — OFFICE VISIT (OUTPATIENT)
Dept: NEUROLOGY | Facility: CLINIC | Age: 85
End: 2020-10-27

## 2020-10-27 VITALS
OXYGEN SATURATION: 98 % | DIASTOLIC BLOOD PRESSURE: 70 MMHG | HEART RATE: 59 BPM | HEIGHT: 62 IN | SYSTOLIC BLOOD PRESSURE: 120 MMHG | BODY MASS INDEX: 36.99 KG/M2 | TEMPERATURE: 96.6 F | WEIGHT: 201 LBS

## 2020-10-27 DIAGNOSIS — Z00.00 ROUTINE GENERAL MEDICAL EXAMINATION AT A HEALTH CARE FACILITY: ICD-10-CM

## 2020-10-27 DIAGNOSIS — G25.2 RESTING TREMOR: Primary | ICD-10-CM

## 2020-10-27 PROCEDURE — 36415 COLL VENOUS BLD VENIPUNCTURE: CPT | Performed by: NURSE PRACTITIONER

## 2020-10-27 PROCEDURE — 99213 OFFICE O/P EST LOW 20 MIN: CPT | Performed by: NURSE PRACTITIONER

## 2020-10-27 NOTE — PROGRESS NOTES
Subjective:     Patient ID: Raleigh Paredes is a 86 y.o. male.    CC:   Chief Complaint   Patient presents with   • Tremors       HPI:   History of Present Illness      Mr. Paredes is here today for follow-up.  When I first saw him about a month ago he was here  for evaluation of tremor.  He told me he first noticed a tremor in his right arm about 2 years ago, tremor seems to be bothersome and that it keeps him from eating well and also he has noticed a significant problem with handwriting.  He felt like his words and letters are becoming much smaller.  He had also noticed a tremor of his lower jaw in the past year as well.  He was concerned as he felt this is progressing in intensity.    He has primary care put him on propranolol about a year ago, he has noticed no improvement in tremor with this medication.  He also takes gabapentin 300 mg a day once at night for peripheral neuropathy, this has not helped his tremor.  He has trouble getting caught up in his feet at times but he denies shuffled gait.  He did complain of right shoulder pain although he reported that this pain has been present for many years, he had a rotator cuff injury that he did not have surgically repaired.  He denied any dysphasia or REM sleep disorder symptoms.  He denied loss of smell.  He feels a bit stiff overall.  He has constipation off and on, he at times has trouble controlling his bladder.  He denied any dizziness upon standing or orthostatic instability  No family history of tremor or Parkinson's disease.  He also denies hallucinations or problems with his memory.  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  He has been on gabapentin and propranolol for over a year with no improvement tremor.  We did discuss options and  trialing Sinemet which he wanted to do.  He tells me today that the Sinemet has made a significant improvement in his tremor, he has much less noticeable resting tremor and is able to do more on the medication.  He denies any adverse side effects from the medication.  We also discussed DaTscan and he would like to move forward with this testing today  He has no new complaints    The following portions of the patient's history were reviewed and updated as appropriate: allergies, current medications, past family history, past medical history, past social history, past surgical history and problem list.    Past Medical History:   Diagnosis Date   • Arthritis     knees and hands    • Borderline diabetes    • Cataract     bilat - may going to have surgery    • Colon polyps 2/16/2017   • Diverticulitis     h/o   • Diverticulosis    • Gout 2/16/2017   • History of kidney stones     x2   • Hypertension    • Hypertensive cardiovascular disease 2/16/2017   • Moderate obesity 2/16/2017   • Palpitations 2/16/2017   • Psoriasis 2/16/2017   • Skin cancer     basal from left arm    • Tinnitus     left    • Wears glasses    • Wears partial dentures     lower        Past Surgical History:   Procedure Laterality Date   • CARDIAC CATHETERIZATION  2000   • COLONOSCOPY     • CYST REMOVAL  1978    Cyst removed from spine, 1978.   • INGUINAL HERNIA REPAIR Bilateral 2009   • KNEE CARTILAGE SURGERY Right 2004    Right knee removal of cartilage, 2004, Dr. Dick.     • SKIN CANCER EXCISION      basal removed from left forear    • TONSILLECTOMY     • TOTAL KNEE ARTHROPLASTY Right 11/29/2018    Procedure: TOTAL KNEE ARTHROPLASTY RIGHT;  Surgeon: Ned Pizarro MD;  Location: [facility];  Service: Orthopedics   • WISDOM TOOTH EXTRACTION      all removed        Social History     Socioeconomic History   • Marital status:      Spouse name: Not on file   • Number of children: Not on file   • Years of education: Not on file   • Highest  "education level: Not on file   Tobacco Use   • Smoking status: Former Smoker     Packs/day: 1.00     Years: 15.00     Pack years: 15.00     Types: Cigarettes     Quit date: 1965     Years since quittin.8   • Smokeless tobacco: Never Used   Substance and Sexual Activity   • Alcohol use: No   • Drug use: No   • Sexual activity: Defer       Family History   Problem Relation Age of Onset   • Colon cancer Other    • Heart failure Mother    • Colon cancer Father    • Heart failure Father    • Cancer Sister    • Stroke Sister    • Heart attack Brother         Review of Systems   Constitutional: Negative.    HENT: Negative.    Eyes: Negative.    Respiratory: Negative.    Cardiovascular: Negative.    Gastrointestinal: Negative.    Endocrine: Negative.    Genitourinary: Negative.    Musculoskeletal: Negative.    Skin: Negative.    Allergic/Immunologic: Negative.    Neurological: Positive for tremors. Negative for dizziness, seizures, syncope, facial asymmetry, speech difficulty, weakness, light-headedness, numbness and headaches.   Hematological: Negative.    Psychiatric/Behavioral: Negative.         Objective:  /70   Pulse 59   Temp 96.6 °F (35.9 °C)   Ht 157.5 cm (62\")   Wt 91.2 kg (201 lb)   SpO2 98%   BMI 36.76 kg/m²     Neurologic Exam     Mental Status   Oriented to person, place, and time.   Attention: normal. Concentration: normal.   Speech: speech is normal   Level of consciousness: alert  Knowledge: consistent with education.   Able to read. Able to write. Normal comprehension.     Cranial Nerves   Cranial nerves II through XII intact.     CN II   Visual fields full to confrontation.   Right visual field deficit: none  Left visual field deficit: none     CN III, IV, VI   Pupils are equal, round, and reactive to light.  Extraocular motions are normal.   Right pupil: Shape: regular. Reactivity: brisk. Consensual response: intact. Accommodation: intact.   Left pupil: Shape: regular. Reactivity: " brisk. Consensual response: intact. Accommodation: intact.   CN III: no CN III palsy  CN VI: no CN VI palsy  Nystagmus: none   Upgaze: normal  Downgaze: normal    CN V   Facial sensation intact.     CN VII   Facial expression full, symmetric.     CN VIII   CN VIII normal.     CN IX, X   CN IX normal.   CN X normal.     CN XI   CN XI normal.     CN XII   CN XII normal.     Motor Exam   Muscle bulk: normal  Overall muscle tone: normal  Right arm tone: normal  Left arm tone: normal  Right arm pronator drift: absent  Left arm pronator drift: absent  Right leg tone: normal  Left leg tone: normal    Strength   Strength 5/5 throughout. No     Sensory Exam   Light touch normal.     Gait, Coordination, and Reflexes     Coordination   Romberg: negative  Finger to nose coordination: normal  Heel to shin coordination: normal  Tandem walking coordination: normal    Reflexes   Reflexes 2+ except as noted.   Right plantar: normal  Left plantar: normal  Right Virgen: absent  Left Virgen: absentHe has a fluid gait, he does have decreased arm swing on the right with tremor noted with ambulation although this is significantly improved from last visit  His resting pill-rolling tremor is significantly improved today as is his jaw tremor       Physical Exam  Eyes:      Extraocular Movements: EOM normal.      Pupils: Pupils are equal, round, and reactive to light.   Neurological:      Mental Status: He is oriented to person, place, and time.      Coordination: Finger-Nose-Finger Test, Heel to Shin Test and Romberg Test normal.      Gait: Tandem walk normal.      Deep Tendon Reflexes: Strength normal.   Psychiatric:         Speech: Speech normal.         Assessment/Plan:       Diagnoses and all orders for this visit:    1. Resting tremor (Primary)  -     Discontinue: carbidopa-levodopa (SINEMET)  MG per tablet; Take 1 pill PO 2-3 times per day  Dispense: 90 tablet; Refill: 0  -     carbidopa-levodopa (SINEMET)  MG per tablet;  Take 1 pill PO 2-3 times per day  Dispense: 270 tablet; Refill: 1  -     NM Brain DaTScan; Future  -     TSH; Future  -     Comprehensive Metabolic Panel; Future  -     CBC & Differential; Future  -     CBC & Differential  -     TSH  -     Comprehensive Metabolic Panel    Patient reports significant improvement in resting tremor with low-dose Sinemet, will continue current dose, I have advised him should he need a midday dose he can add this to his regimen, at this time he does not feel this is necessary.  DaTscan ordered  Labs as noted above  I will contact him with results of CALVIN scan and labs as they are received.  I will also see him back in 3 months or sooner if needed         Reviewed medications, potential side effects and signs and symptoms to report. Discussed risk versus benefits of treatment plan with patient and/or family-including medications, labs and radiology that may be ordered. Addressed questions and concerns during visit. Patient and/or family verbalized understanding and agree with plan.    AS THE PROVIDER, I PERSONALLY WORE PPE DURING ENTIRE FACE TO FACE ENCOUNTER IN CLINIC WITH THE PATIENT. PATIENT ALSO WORE PPE DURING ENTIRE FACE TO FACE ENCOUNTER EXCEPT FOR A MAX OF 30 SECONDS DURING NEUROLOGICAL EVALUATION OF CRANIAL NERVES AND THEN MASK WAS PLACED BACK OVER PATIENT FACE FOR REMAINDER OF VISIT. I WASHED MY HANDS BEFORE AND AFTER VISIT.        Jemima Valdez, APRN  10/27/2020

## 2020-10-28 LAB
ALBUMIN SERPL-MCNC: 4.1 G/DL (ref 3.5–5.2)
ALBUMIN/GLOB SERPL: 2.3 G/DL
ALP SERPL-CCNC: 182 U/L (ref 39–117)
ALT SERPL-CCNC: 14 U/L (ref 1–41)
AST SERPL-CCNC: 20 U/L (ref 1–40)
BASOPHILS # BLD AUTO: 0.09 10*3/MM3 (ref 0–0.2)
BASOPHILS NFR BLD AUTO: 1.2 % (ref 0–1.5)
BILIRUB SERPL-MCNC: 0.2 MG/DL (ref 0–1.2)
BUN SERPL-MCNC: 26 MG/DL (ref 8–23)
BUN/CREAT SERPL: 21.8 (ref 7–25)
CALCIUM SERPL-MCNC: 8.8 MG/DL (ref 8.6–10.5)
CHLORIDE SERPL-SCNC: 106 MMOL/L (ref 98–107)
CO2 SERPL-SCNC: 27 MMOL/L (ref 22–29)
CREAT SERPL-MCNC: 1.19 MG/DL (ref 0.76–1.27)
EOSINOPHIL # BLD AUTO: 0.37 10*3/MM3 (ref 0–0.4)
EOSINOPHIL NFR BLD AUTO: 4.8 % (ref 0.3–6.2)
ERYTHROCYTE [DISTWIDTH] IN BLOOD BY AUTOMATED COUNT: 13.3 % (ref 12.3–15.4)
GLOBULIN SER CALC-MCNC: 1.8 GM/DL
GLUCOSE SERPL-MCNC: 176 MG/DL (ref 65–99)
HCT VFR BLD AUTO: 36.3 % (ref 37.5–51)
HGB BLD-MCNC: 12.1 G/DL (ref 13–17.7)
IMM GRANULOCYTES # BLD AUTO: 0.05 10*3/MM3 (ref 0–0.05)
IMM GRANULOCYTES NFR BLD AUTO: 0.6 % (ref 0–0.5)
LYMPHOCYTES # BLD AUTO: 1.56 10*3/MM3 (ref 0.7–3.1)
LYMPHOCYTES NFR BLD AUTO: 20.1 % (ref 19.6–45.3)
MCH RBC QN AUTO: 31.6 PG (ref 26.6–33)
MCHC RBC AUTO-ENTMCNC: 33.3 G/DL (ref 31.5–35.7)
MCV RBC AUTO: 94.8 FL (ref 79–97)
MONOCYTES # BLD AUTO: 0.79 10*3/MM3 (ref 0.1–0.9)
MONOCYTES NFR BLD AUTO: 10.2 % (ref 5–12)
NEUTROPHILS # BLD AUTO: 4.92 10*3/MM3 (ref 1.7–7)
NEUTROPHILS NFR BLD AUTO: 63.1 % (ref 42.7–76)
NRBC BLD AUTO-RTO: 0 /100 WBC (ref 0–0.2)
PLATELET # BLD AUTO: 192 10*3/MM3 (ref 140–450)
POTASSIUM SERPL-SCNC: 4.5 MMOL/L (ref 3.5–5.2)
PROT SERPL-MCNC: 5.9 G/DL (ref 6–8.5)
RBC # BLD AUTO: 3.83 10*6/MM3 (ref 4.14–5.8)
SODIUM SERPL-SCNC: 142 MMOL/L (ref 136–145)
TSH SERPL DL<=0.005 MIU/L-ACNC: 2.27 UIU/ML (ref 0.27–4.2)
WBC # BLD AUTO: 7.78 10*3/MM3 (ref 3.4–10.8)

## 2020-11-06 ENCOUNTER — TELEPHONE (OUTPATIENT)
Dept: NEUROLOGY | Facility: CLINIC | Age: 85
End: 2020-11-06

## 2020-11-06 NOTE — TELEPHONE ENCOUNTER
SPOKE TO BLAISE'S DAUGHTER, THEY SAW DR BERRIOS AND HE STOPPED THE PROPRANOLOL AND THINKS THAT MAY HAVE CONTRIBUTED TO SLOWING HIS HEART RATE. HE WILL CONTINUE THE MEDICATION FOR NOW AND INFORMED THAT IT COULD CAUSE FATIGUE. HE IS GOING TO ADDRESS THE ANEMIA AND RECHECK LABS IN A MONTH.

## 2020-11-06 NOTE — TELEPHONE ENCOUNTER
----- Message from CHARLY Gómez sent at 11/3/2020  9:43 AM EST -----  I could cause some fatigue, he can stop if he is having side effects, see if symptoms improve off medication

## 2020-11-16 ENCOUNTER — TELEPHONE (OUTPATIENT)
Dept: NEUROLOGY | Facility: CLINIC | Age: 85
End: 2020-11-16

## 2020-11-16 NOTE — TELEPHONE ENCOUNTER
PT'S DAUGHTER JANA CALLED IN TODAY REGARDING THE [facility] TESTING THAT IS SCHEDULED FOR PT AT . SHE SAID SHE SPOKE WITH SOMEONE AT THE OFFICE LAST WEEK WHO TOLD HER APPT DATE/TIME WITH  AND GAVE INFORMATION ON WHERE TO GO ONCE THERE AND SHE CAN'T REMEMBER. PLEASE ADVISE      CALL BACK- [phone]

## 2020-12-09 ENCOUNTER — OFFICE VISIT (OUTPATIENT)
Dept: ORTHOPEDIC SURGERY | Facility: CLINIC | Age: 85
End: 2020-12-09

## 2020-12-09 VITALS — HEART RATE: 44 BPM | OXYGEN SATURATION: 97 % | HEIGHT: 62 IN | BODY MASS INDEX: 36.62 KG/M2 | WEIGHT: 199 LBS

## 2020-12-09 DIAGNOSIS — M17.12 PRIMARY OSTEOARTHRITIS OF LEFT KNEE: Primary | ICD-10-CM

## 2020-12-09 PROCEDURE — 20610 DRAIN/INJ JOINT/BURSA W/O US: CPT | Performed by: ORTHOPAEDIC SURGERY

## 2020-12-09 PROCEDURE — 99214 OFFICE O/P EST MOD 30 MIN: CPT | Performed by: ORTHOPAEDIC SURGERY

## 2020-12-09 RX ORDER — TRIAMCINOLONE ACETONIDE 40 MG/ML
40 INJECTION, SUSPENSION INTRA-ARTICULAR; INTRAMUSCULAR
Status: COMPLETED | OUTPATIENT
Start: 2020-12-09 | End: 2020-12-09

## 2020-12-09 RX ORDER — ROPIVACAINE HYDROCHLORIDE 5 MG/ML
4 INJECTION, SOLUTION EPIDURAL; INFILTRATION; PERINEURAL
Status: COMPLETED | OUTPATIENT
Start: 2020-12-09 | End: 2020-12-09

## 2020-12-09 RX ADMIN — ROPIVACAINE HYDROCHLORIDE 4 ML: 5 INJECTION, SOLUTION EPIDURAL; INFILTRATION; PERINEURAL at 15:22

## 2020-12-09 RX ADMIN — TRIAMCINOLONE ACETONIDE 40 MG: 40 INJECTION, SUSPENSION INTRA-ARTICULAR; INTRAMUSCULAR at 15:22

## 2020-12-09 NOTE — PROGRESS NOTES
Procedure   Large Joint Arthrocentesis: L knee  Date/Time: 12/9/2020 3:22 PM  Consent given by: patient  Site marked: site marked  Timeout: Immediately prior to procedure a time out was called to verify the correct patient, procedure, equipment, support staff and site/side marked as required   Supporting Documentation  Indications: pain   Procedure Details  Location: knee - L knee  Preparation: Patient was prepped and draped in the usual sterile fashion  Needle size: 22 G  Approach: anterolateral  Medications administered: 4 mL ropivacaine 0.5 %; 40 mg triamcinolone acetonide 40 MG/ML  Aspirate amount: 10 mL  Aspirate: clear and yellow  Patient tolerance: patient tolerated the procedure well with no immediate complications

## 2020-12-09 NOTE — PROGRESS NOTES
[facility] – [place], [place] Orthopaedic Surgery Clinic Note    Subjective     Chief Complaint   Patient presents with   • Left Knee - Pain        HPI    Raleigh Paredes is a 86 y.o. male who presents with left knee pain.  Onset: atraumatic and gradual in nature. The issue has been ongoing for 4 week(s). Pain is a 6/10 on the pain scale. Pain is described as stabbing and shooting. Associated symptoms include pain and swelling. The pain is worse with walking, standing, climbing stairs and working; heat improve the pain. Previous treatments have included: cane/walker.  Pain primarily on the medial aspect of the knee.    I have reviewed the following portions of the patient's history and agree with: History of Present Illness and Review of Systems    Patient Active Problem List   Diagnosis   • Hypertensive cardiovascular disease   • Palpitations   • Gout   • Moderate obesity   • Psoriasis   • Colon polyps   • Primary osteoarthritis of right knee   • HTN (hypertension)   • Status post total right knee replacement   • DM (diabetes mellitus) (CMS/[facility])- new dx   • Leukocytosis, mild, likely reactive   • Acute postoperative pain     Past Medical History:   Diagnosis Date   • Arthritis     knees and hands    • Borderline diabetes    • Cataract     bilat - may going to have surgery    • Colon polyps 2/16/2017   • Diverticulitis     h/o   • Diverticulosis    • Gout 2/16/2017   • History of kidney stones     x2   • Hypertension    • Hypertensive cardiovascular disease 2/16/2017   • Moderate obesity 2/16/2017   • Palpitations 2/16/2017   • Psoriasis 2/16/2017   • Skin cancer     basal from left arm    • Tinnitus     left    • Wears glasses    • Wears partial dentures     lower       Past Surgical History:   Procedure Laterality Date   • CARDIAC CATHETERIZATION  2000   • COLONOSCOPY     • CYST REMOVAL  1978    Cyst removed from spine, 1978.   • INGUINAL HERNIA REPAIR Bilateral 2009   • KNEE CARTILAGE SURGERY Right 2004    Right knee removal of  davian, , Dr. Dick.     • SKIN CANCER EXCISION      basal removed from left forear    • TONSILLECTOMY     • TOTAL KNEE ARTHROPLASTY Right 2018    Procedure: TOTAL KNEE ARTHROPLASTY RIGHT;  Surgeon: Ned Pizarro MD;  Location: [facility];  Service: Orthopedics   • WISDOM TOOTH EXTRACTION      all removed       Family History   Problem Relation Age of Onset   • Colon cancer Other    • Heart failure Mother    • Colon cancer Father    • Heart failure Father    • Cancer Sister    • Stroke Sister    • Heart attack Brother      Social History     Socioeconomic History   • Marital status:      Spouse name: Not on file   • Number of children: Not on file   • Years of education: Not on file   • Highest education level: Not on file   Tobacco Use   • Smoking status: Former Smoker     Packs/day: 1.00     Years: 15.00     Pack years: 15.00     Types: Cigarettes     Quit date: 1965     Years since quittin.9   • Smokeless tobacco: Never Used   Substance and Sexual Activity   • Alcohol use: No   • Drug use: No   • Sexual activity: Defer      Current Outpatient Medications on File Prior to Visit   Medication Sig Dispense Refill   • allopurinol (ZYLOPRIM) 300 MG tablet Take 300 mg by mouth Daily.     • carbidopa-levodopa (SINEMET)  MG per tablet Take 1 pill PO 2-3 times per day 270 tablet 1   • gabapentin (NEURONTIN) 300 MG capsule Take 300 mg by mouth Every Night.     • glipiZIDE (GLUCOTROL) 5 MG tablet      • lisinopril-hydrochlorothiazide (PRINZIDE,ZESTORETIC) 20-12.5 MG per tablet Take 1 tablet by mouth Daily.     • metoprolol tartrate (LOPRESSOR) 25 MG tablet Take 25 mg by mouth 2 (Two) Times a Day.     • propranolol (INDERAL) 10 MG tablet Take 10 mg by mouth 3 (Three) Times a Day.       No current facility-administered medications on file prior to visit.       Allergies   Allergen Reactions   • Penicillins Itching     Hand edema        Review of Systems   Constitutional: Negative.    HENT:  "Positive for drooling, hearing loss and tinnitus.    Eyes: Negative.    Respiratory: Negative.    Cardiovascular: Negative.    Gastrointestinal: Negative.    Endocrine: Negative.    Genitourinary: Negative.    Musculoskeletal: Positive for arthralgias.   Skin: Negative.    Allergic/Immunologic: Negative.    Neurological: Positive for tremors.   Hematological: Negative.    Psychiatric/Behavioral: Negative.         Objective      Physical Exam  Pulse (!) 44   Ht 157.5 cm (62.01\")   Wt 90.3 kg (199 lb)   SpO2 97%   BMI 36.39 kg/m²     Body mass index is 36.39 kg/m².    General:   Mental Status:  Alert   Appearance: Cooperative, in no acute distress   Build and Nutrition: Well-nourished well-developed male   Orientation: Alert and oriented to person, place and time   Posture: Normal   Gait: Slight limp on the left    Integument:   Left knee: No skin lesions, no rash, no ecchymosis    Neurologic:   Sensation:    Left foot: Intact to light touch on the dorsal and plantar aspect   Motor:  Left lower extremity: 5/5 quadriceps, hamstrings, ankle dorsiflexors, and ankle plantar flexors    Lower Extremities:   Left Knee:    Tenderness:  Medial joint line tenderness    Effusion:  None    Swelling:  None    Crepitus:  Positive    Atrophy:  None    Range of motion:  Extension: 0°       Flexion: 120°  Instability:  No varus laxity, no valgus laxity, negative anterior drawer  Deformities:  Varus      Imaging/Studies      Imaging Results (Last 24 Hours)     Procedure Component Value Units Date/Time    XR Knee 4+ View Left [[id number]] Resulted: 12/09/20 1516     Updated: 12/09/20 1517    Narrative:      Left Knee Radiographs  Indication: left knee pain  Views: Standing AP's and skiers of both knees, with lateral and sunrise   views of the left knee    Comparison: no prior studies available    Findings:   Medial joint space narrowing, patellofemoral degeneration, no acute bony   abnormalities.  No unusual bony features.  Varus " alignment.          Assessment and Plan     Diagnoses and all orders for this visit:    1. Primary osteoarthritis of left knee (Primary)  -     XR Knee 4+ View Left  -     Large Joint Arthrocentesis: L knee        1. Primary osteoarthritis of left knee        I reviewed my findings with the patient today.  Left knee has been bothering him, and have offered him an aspiration and injection today.  He wishes to proceed.  I will see him back in 3 months, but sooner for any problems.    Procedure Note:  The potential benefits of performing a therapeutic left knee joint aspiration and injection, as well as potential risks (including, but not limited to infection, swelling, pain, bleeding, bruising, nerve/blood vessel damage, skin color changes, transient elevation in blood glucose levels, and fat atrophy) were discussed with the patient.  After informed consent, timeout procedure was performed, and the skin on the left knee was prepped with chlorhexidine soap and alcohol, after which ethyl chloride was applied to the skin at the injection site. Via the superior lateral approach, 10 cc of clear straw-colored fluid was aspirated, then 1ml of Kenalog 40mg/ml mixed with 4ml 0.5% ropivacaine plain was injected into the knee joint.  The patient tolerated the procedure well, experiencing 25% improvement a few minutes following the injection. There were no complications.  Band-Aid was applied to the injection site. Post-procedural instructions were given to the patient and/or their caregiver.      Return in about 4 months (around 4/9/2021).    Medical Decision Making  Management Options : prescription/IM medicine  Data/Risk: radiology tests and independent visualization of imaging, lab tests, or EMG/NCV      Ned Pizarro MD  12/09/20  15:43 BENTLEY Johnson disclaimer:  Much of this encounter note is an electronic transcription/translation of spoken language to printed text. The electronic translation of spoken language may  permit erroneous, or at times, nonsensical words or phrases to be inadvertently transcribed; Although I have reviewed the note for such errors, some may still exist.

## 2020-12-15 ENCOUNTER — TELEPHONE (OUTPATIENT)
Dept: ORTHOPEDIC SURGERY | Facility: CLINIC | Age: 85
End: 2020-12-15

## 2020-12-15 DIAGNOSIS — M17.12 PRIMARY OSTEOARTHRITIS OF LEFT KNEE: Primary | ICD-10-CM

## 2020-12-15 NOTE — TELEPHONE ENCOUNTER
I called patient, he explains that the injection you gave him did not help. He got it a week ago. I explained that sometimes it can take longer and suggested he give it some more time. He agreed but is interested in getting Visco in the knee. He has had it in the right knee prior to him having surgery on that knee. Please advise thank you!  Christi

## 2020-12-15 NOTE — TELEPHONE ENCOUNTER
I called patient and left him a message letting him know this was put in and one of the girls from the office will be working on getting that approved through his insurance and then they will call him to schedule those shots.  Christi

## 2020-12-15 NOTE — TELEPHONE ENCOUNTER
PATIENT WOULD LIKE A CALL BACK BECAUSE HE SAYS THAT THE CORTISONE SHOT DID NOT WORK. PLEASE CALL [phone]

## 2020-12-28 ENCOUNTER — CLINICAL SUPPORT (OUTPATIENT)
Dept: ORTHOPEDIC SURGERY | Facility: CLINIC | Age: 85
End: 2020-12-28

## 2020-12-28 DIAGNOSIS — M17.12 PRIMARY OSTEOARTHRITIS OF LEFT KNEE: Primary | ICD-10-CM

## 2020-12-28 PROCEDURE — 20610 DRAIN/INJ JOINT/BURSA W/O US: CPT | Performed by: ORTHOPAEDIC SURGERY

## 2020-12-28 NOTE — PROGRESS NOTES
[facility] – [place] Orthopaedic Surgery Clinic Note    Subjective     Chief Complaint   Patient presents with   • Injections     left knee orthovisc injection #1         HPI    Raleigh Paredes is a 86 y.o. male who presents for the first Orthovisc injection into the left knee.    Patient Active Problem List   Diagnosis   • Hypertensive cardiovascular disease   • Palpitations   • Gout   • Moderate obesity   • Psoriasis   • Colon polyps   • Primary osteoarthritis of right knee   • HTN (hypertension)   • Status post total right knee replacement   • DM (diabetes mellitus) (CMS/HCC)- new dx   • Leukocytosis, mild, likely reactive   • Acute postoperative pain     Past Medical History:   Diagnosis Date   • Arthritis     knees and hands    • Borderline diabetes    • Cataract     bilat - may going to have surgery    • Colon polyps 2/16/2017   • Diverticulitis     h/o   • Diverticulosis    • Gout 2/16/2017   • History of kidney stones     x2   • Hypertension    • Hypertensive cardiovascular disease 2/16/2017   • Moderate obesity 2/16/2017   • Palpitations 2/16/2017   • Psoriasis 2/16/2017   • Skin cancer     basal from left arm    • Tinnitus     left    • Wears glasses    • Wears partial dentures     lower       Past Surgical History:   Procedure Laterality Date   • CARDIAC CATHETERIZATION  2000   • COLONOSCOPY     • CYST REMOVAL  1978    Cyst removed from spine, 1978.   • INGUINAL HERNIA REPAIR Bilateral 2009   • KNEE CARTILAGE SURGERY Right 2004    Right knee removal of cartilage, 2004, Dr. Dick.     • SKIN CANCER EXCISION      basal removed from left forear    • TONSILLECTOMY     • TOTAL KNEE ARTHROPLASTY Right 11/29/2018    Procedure: TOTAL KNEE ARTHROPLASTY RIGHT;  Surgeon: Ned Pizarro MD;  Location: [facility];  Service: Orthopedics   • WISDOM TOOTH EXTRACTION      all removed       Family History   Problem Relation Age of Onset   • Colon cancer Other    • Heart failure Mother    • Colon cancer Father    • Heart failure  Father    • Cancer Sister    • Stroke Sister    • Heart attack Brother      Social History     Socioeconomic History   • Marital status:      Spouse name: Not on file   • Number of children: Not on file   • Years of education: Not on file   • Highest education level: Not on file   Tobacco Use   • Smoking status: Former Smoker     Packs/day: 1.00     Years: 15.00     Pack years: 15.00     Types: Cigarettes     Quit date: 1965     Years since quittin.0   • Smokeless tobacco: Never Used   Substance and Sexual Activity   • Alcohol use: No   • Drug use: No   • Sexual activity: Defer      Current Outpatient Medications on File Prior to Visit   Medication Sig Dispense Refill   • allopurinol (ZYLOPRIM) 300 MG tablet Take 300 mg by mouth Daily.     • carbidopa-levodopa (SINEMET)  MG per tablet Take 1 pill PO 2-3 times per day 270 tablet 1   • gabapentin (NEURONTIN) 300 MG capsule Take 300 mg by mouth Every Night.     • glipiZIDE (GLUCOTROL) 5 MG tablet      • lisinopril-hydrochlorothiazide (PRINZIDE,ZESTORETIC) 20-12.5 MG per tablet Take 1 tablet by mouth Daily.     • metoprolol tartrate (LOPRESSOR) 25 MG tablet Take 25 mg by mouth 2 (Two) Times a Day.     • propranolol (INDERAL) 10 MG tablet Take 10 mg by mouth 3 (Three) Times a Day.       No current facility-administered medications on file prior to visit.       Allergies   Allergen Reactions   • Penicillins Itching     Hand edema        Review of Systems     Objective      Physical Exam  There were no vitals taken for this visit.    There is no height or weight on file to calculate BMI.    General:   Mental Status:  Alert   Appearance: Cooperative, in no acute distress   Posture: Normal    Assessment and Plan     Diagnoses and all orders for this visit:    1. Primary osteoarthritis of left knee (Primary)  -     Large Joint Arthrocentesis: L knee        1. Primary osteoarthritis of left knee        Procedure Note:  The potential benefits of performing  a therapeutic knee joint visco supplementation injection, as well as potential risks (including, but not limited to infection, swelling, pain, bleeding, bruising, nerve/blood vessel damage, and pseudoseptic reaction) have been discussed with the patient.  After informed consent, timeout procedure was performed, and the skin on the left knee was prepped with chlorhexidine soap and alcohol, after which ethyl chloride was applied to the skin at the injection site. Via the anterolateral approach, Orthovisc was injected into the knee joint.  The patient tolerated the procedure well. There were no complications.  Band-Aid was applied to the injection site. Post-procedural instructions discussed with the patient and/or their caregiver.    Return in about 1 week (around 1/4/2021) for Injection.    Ned Pizarro MD  12/28/20  11:53 BENTLEY Johnson disclaimer:  Much of this encounter note is an electronic transcription/translation of spoken language to printed text. The electronic translation of spoken language may permit erroneous, or at times, nonsensical words or phrases to be inadvertently transcribed; Although I have reviewed the note for such errors, some may still exist.

## 2020-12-28 NOTE — PROGRESS NOTES
Procedure   Large Joint Arthrocentesis: L knee  Date/Time: 12/28/2020 11:26 AM  Consent given by: patient  Site marked: site marked  Timeout: Immediately prior to procedure a time out was called to verify the correct patient, procedure, equipment, support staff and site/side marked as required   Supporting Documentation  Indications: pain   Procedure Details  Location: knee - L knee  Preparation: Patient was prepped and draped in the usual sterile fashion  Needle size: 22 G  Approach: anterolateral  Medications administered: 30 mg Hyaluronan 30 MG/2ML  Patient tolerance: patient tolerated the procedure well with no immediate complications

## 2021-01-15 ENCOUNTER — TELEPHONE (OUTPATIENT)
Dept: ORTHOPEDIC SURGERY | Facility: CLINIC | Age: 86
End: 2021-01-15

## 2021-01-15 NOTE — TELEPHONE ENCOUNTER
PATIENT NOT SENT A SAME DAY CANCEL LETTER FOR CANCELLATION ON 1/4/21 BECAUSE PATIENT TESTED POSITIVE FOR COVID-19.

## 2021-02-03 ENCOUNTER — TELEPHONE (OUTPATIENT)
Dept: ORTHOPEDIC SURGERY | Facility: CLINIC | Age: 86
End: 2021-02-03

## 2021-02-03 DIAGNOSIS — M17.12 PRIMARY OSTEOARTHRITIS OF LEFT KNEE: Primary | ICD-10-CM

## 2021-02-03 NOTE — TELEPHONE ENCOUNTER
Patient's daughter & POA is calling regarding patient's orthovisc injections. Patient received the first injection but had to cancel 2nd & 3rd injections due to patient testing positive for COVID. His daughter would like to know if patient can continue with the series or whether he would have to start all over again. She can be reached at [phone], she will be unavailable today from 11 AM-12 PM.

## 2021-02-03 NOTE — TELEPHONE ENCOUNTER
Called Angie, let her know that he would need to start the injections over again since it has been more than a week since his last injection. I let her know that I would get this message up to the referral coordinators to get the process started to get him approved.     Lyn

## 2021-02-03 NOTE — TELEPHONE ENCOUNTER
Can you work on getting him set up again for more injections. He had to miss 2&3 due to COVID. Call daughter to make appointments.         Thanks.     Lyn

## 2021-02-08 ENCOUNTER — CLINICAL SUPPORT (OUTPATIENT)
Dept: ORTHOPEDIC SURGERY | Facility: CLINIC | Age: 86
End: 2021-02-08

## 2021-02-08 DIAGNOSIS — M17.12 PRIMARY OSTEOARTHRITIS OF LEFT KNEE: Primary | ICD-10-CM

## 2021-02-08 PROCEDURE — 20610 DRAIN/INJ JOINT/BURSA W/O US: CPT | Performed by: ORTHOPAEDIC SURGERY

## 2021-02-08 NOTE — PROGRESS NOTES
[facility] – [place]opaedic [place] Clinic Note    Subjective     Chief Complaint   Patient presents with   • Injections     Left Knee Orthovisc injection #1        HPI    Raleigh Paredes is a 86 y.o. male who presents for a left knee Orthovisc injection today. Raleigh's injection series had to be restarted due to him saba COVID-19, and missing several appointments.     Patient Active Problem List   Diagnosis   • Hypertensive cardiovascular disease   • Palpitations   • Gout   • Moderate obesity   • Psoriasis   • Colon polyps   • Primary osteoarthritis of right knee   • HTN (hypertension)   • Status post total right knee replacement   • DM (diabetes mellitus) (CMS/HCC)- new dx   • Leukocytosis, mild, likely reactive   • Acute postoperative pain     Past Medical History:   Diagnosis Date   • Arthritis     knees and hands    • Borderline diabetes    • Cataract     bilat - may going to have surgery    • Colon polyps 2/16/2017   • Diverticulitis     h/o   • Diverticulosis    • Gout 2/16/2017   • History of kidney stones     x2   • Hypertension    • Hypertensive cardiovascular disease 2/16/2017   • Moderate obesity 2/16/2017   • Palpitations 2/16/2017   • Psoriasis 2/16/2017   • Skin cancer     basal from left arm    • Tinnitus     left    • Wears glasses    • Wears partial dentures     lower       Past Surgical History:   Procedure Laterality Date   • CARDIAC CATHETERIZATION  2000   • COLONOSCOPY     • CYST REMOVAL  1978    Cyst removed from spine, 1978.   • INGUINAL HERNIA REPAIR Bilateral 2009   • KNEE CARTILAGE SURGERY Right 2004    Right knee removal of cartilage, 2004, Dr. Dick.     • SKIN CANCER EXCISION      basal removed from left forear    • TONSILLECTOMY     • TOTAL KNEE ARTHROPLASTY Right 11/29/2018    Procedure: TOTAL KNEE ARTHROPLASTY RIGHT;  Surgeon: Ned Pizarro MD;  Location: [facility];  Service: Orthopedics   • WISDOM TOOTH EXTRACTION      all removed       Family History   Problem Relation Age of  Onset   • Colon cancer Other    • Heart failure Mother    • Colon cancer Father    • Heart failure Father    • Cancer Sister    • Stroke Sister    • Heart attack Brother      Social History     Socioeconomic History   • Marital status:      Spouse name: Not on file   • Number of children: Not on file   • Years of education: Not on file   • Highest education level: Not on file   Tobacco Use   • Smoking status: Former Smoker     Packs/day: 1.00     Years: 15.00     Pack years: 15.00     Types: Cigarettes     Quit date: 1965     Years since quittin.1   • Smokeless tobacco: Never Used   Substance and Sexual Activity   • Alcohol use: No   • Drug use: No   • Sexual activity: Defer      Current Outpatient Medications on File Prior to Visit   Medication Sig Dispense Refill   • allopurinol (ZYLOPRIM) 300 MG tablet Take 300 mg by mouth Daily.     • carbidopa-levodopa (SINEMET)  MG per tablet Take 1 pill PO 2-3 times per day 270 tablet 1   • gabapentin (NEURONTIN) 300 MG capsule Take 300 mg by mouth Every Night.     • glipiZIDE (GLUCOTROL) 5 MG tablet      • lisinopril-hydrochlorothiazide (PRINZIDE,ZESTORETIC) 20-12.5 MG per tablet Take 1 tablet by mouth Daily.     • metoprolol tartrate (LOPRESSOR) 25 MG tablet Take 25 mg by mouth 2 (Two) Times a Day.     • propranolol (INDERAL) 10 MG tablet Take 10 mg by mouth 3 (Three) Times a Day.       No current facility-administered medications on file prior to visit.       Allergies   Allergen Reactions   • Penicillins Itching     Hand edema        Review of Systems   Constitutional: Negative.    HENT: Negative.    Eyes: Negative.    Respiratory: Negative.    Cardiovascular: Negative.    Gastrointestinal: Negative.    Endocrine: Negative.    Genitourinary: Negative.    Musculoskeletal: Positive for arthralgias.   Skin: Negative.    Allergic/Immunologic: Negative.    Neurological: Negative.    Hematological: Negative.    Psychiatric/Behavioral: Negative.          Objective      Physical Exam  There were no vitals taken for this visit.    There is no height or weight on file to calculate BMI.    General:   Mental Status:  Alert   Appearance: Cooperative, in no acute distress   Posture: Normal    Assessment and Plan     Diagnoses and all orders for this visit:    1. Primary osteoarthritis of left knee (Primary)        1. Primary osteoarthritis of left knee        Procedure Note:  The potential benefits of performing a therapeutic knee joint visco supplementation injection, as well as potential risks (including, but not limited to infection, swelling, pain, bleeding, bruising, nerve/blood vessel damage, and pseudoseptic reaction) have been discussed with the patient.  After informed consent, timeout procedure was performed, and the skin on the left knee was prepped with chlorhexidine soap and alcohol, after which ethyl chloride was applied to the skin at the injection site. Via the anterolateral approach, Orthovisc was injected into the knee joint.  The patient tolerated the procedure well. There were no complications.  Band-Aid was applied to the injection site. Post-procedural instructions discussed with the patient and/or their caregiver.    The patient will follow up in 1 week for his second Orthovisc injection.     Return in about 1 week (around 2/15/2021) for Injection.    Scribed for Ned Pizarro MD by HUMBERTO VAZQUEZ.  2/8/2021  09:03 BENTLEY Pizarro MD  02/08/21  09:38 BENTLEY Johnson disclaimer:  Much of this encounter note is an electronic transcription/translation of spoken language to printed text. The electronic translation of spoken language may permit erroneous, or at times, nonsensical words or phrases to be inadvertently transcribed; Although I have reviewed the note for such errors, some may still exist.

## 2021-02-08 NOTE — PROGRESS NOTES
Procedure   Large Joint Arthrocentesis: L knee  Date/Time: 2/8/2021 8:28 AM  Consent given by: patient  Site marked: site marked  Timeout: Immediately prior to procedure a time out was called to verify the correct patient, procedure, equipment, support staff and site/side marked as required   Supporting Documentation  Indications: pain   Procedure Details  Location: knee - L knee  Preparation: Patient was prepped and draped in the usual sterile fashion  Needle size: 22 G  Approach: anterolateral  Medications administered: 30 mg Hyaluronan 30 MG/2ML  Patient tolerance: patient tolerated the procedure well with no immediate complications

## 2021-02-15 ENCOUNTER — CLINICAL SUPPORT (OUTPATIENT)
Dept: ORTHOPEDIC SURGERY | Facility: CLINIC | Age: 86
End: 2021-02-15

## 2021-02-15 DIAGNOSIS — M17.12 PRIMARY OSTEOARTHRITIS OF LEFT KNEE: Primary | ICD-10-CM

## 2021-02-15 PROCEDURE — 20610 DRAIN/INJ JOINT/BURSA W/O US: CPT | Performed by: ORTHOPAEDIC SURGERY

## 2021-02-15 NOTE — PROGRESS NOTES
Procedure   Large Joint Arthrocentesis: L knee  Date/Time: 2/15/2021 8:02 AM  Consent given by: patient  Site marked: site marked  Timeout: Immediately prior to procedure a time out was called to verify the correct patient, procedure, equipment, support staff and site/side marked as required   Supporting Documentation  Indications: pain   Procedure Details  Location: knee - L knee  Preparation: Patient was prepped and draped in the usual sterile fashion  Needle size: 22 G  Approach: anterolateral  Medications administered: 30 mg Hyaluronan 30 MG/2ML  Patient tolerance: patient tolerated the procedure well with no immediate complications

## 2021-02-15 NOTE — PROGRESS NOTES
[facility] [place], [place] Orthopaedic Surgery Clinic Note    Subjective     Chief Complaint   Patient presents with   • Left Knee - Follow-up     orthovisc #2 injection        HPI    Raleigh Paredes is a 86 y.o. male who presents for his 2nd Orthovisc injection into the left knee. He affirms experiencing some improvement after the first Orthovisc injection.     Patient Active Problem List   Diagnosis   • Hypertensive cardiovascular disease   • Palpitations   • Gout   • Moderate obesity   • Psoriasis   • Colon polyps   • Primary osteoarthritis of right knee   • HTN (hypertension)   • Status post total right knee replacement   • DM (diabetes mellitus) (CMS/HCC)- new dx   • Leukocytosis, mild, likely reactive   • Acute postoperative pain     Past Medical History:   Diagnosis Date   • Arthritis     knees and hands    • Borderline diabetes    • Cataract     bilat - may going to have surgery    • Colon polyps 2/16/2017   • Diverticulitis     h/o   • Diverticulosis    • Gout 2/16/2017   • History of kidney stones     x2   • Hypertension    • Hypertensive cardiovascular disease 2/16/2017   • Moderate obesity 2/16/2017   • Palpitations 2/16/2017   • Psoriasis 2/16/2017   • Skin cancer     basal from left arm    • Tinnitus     left    • Wears glasses    • Wears partial dentures     lower       Past Surgical History:   Procedure Laterality Date   • CARDIAC CATHETERIZATION  2000   • COLONOSCOPY     • CYST REMOVAL  1978    Cyst removed from spine, 1978.   • INGUINAL HERNIA REPAIR Bilateral 2009   • KNEE CARTILAGE SURGERY Right 2004    Right knee removal of cartilage, 2004, Dr. Dick.     • SKIN CANCER EXCISION      basal removed from left forear    • TONSILLECTOMY     • TOTAL KNEE ARTHROPLASTY Right 11/29/2018    Procedure: TOTAL KNEE ARTHROPLASTY RIGHT;  Surgeon: Ned Pizarro MD;  Location: [facility];  Service: Orthopedics   • WISDOM TOOTH EXTRACTION      all removed       Family History   Problem Relation Age of Onset   • Colon cancer  Other    • Heart failure Mother    • Colon cancer Father    • Heart failure Father    • Cancer Sister    • Stroke Sister    • Heart attack Brother      Social History     Socioeconomic History   • Marital status:      Spouse name: Not on file   • Number of children: Not on file   • Years of education: Not on file   • Highest education level: Not on file   Tobacco Use   • Smoking status: Former Smoker     Packs/day: 1.00     Years: 15.00     Pack years: 15.00     Types: Cigarettes     Quit date: 1965     Years since quittin.1   • Smokeless tobacco: Never Used   Substance and Sexual Activity   • Alcohol use: No   • Drug use: No   • Sexual activity: Defer      Current Outpatient Medications on File Prior to Visit   Medication Sig Dispense Refill   • allopurinol (ZYLOPRIM) 300 MG tablet Take 300 mg by mouth Daily.     • carbidopa-levodopa (SINEMET)  MG per tablet Take 1 pill PO 2-3 times per day 270 tablet 1   • gabapentin (NEURONTIN) 300 MG capsule Take 300 mg by mouth Every Night.     • glipiZIDE (GLUCOTROL) 5 MG tablet      • lisinopril-hydrochlorothiazide (PRINZIDE,ZESTORETIC) 20-12.5 MG per tablet Take 1 tablet by mouth Daily.     • metoprolol tartrate (LOPRESSOR) 25 MG tablet Take 25 mg by mouth 2 (Two) Times a Day.     • propranolol (INDERAL) 10 MG tablet Take 10 mg by mouth 3 (Three) Times a Day.       No current facility-administered medications on file prior to visit.       Allergies   Allergen Reactions   • Penicillins Itching     Hand edema        Review of Systems   Constitutional: Negative.  Negative for activity change, appetite change, chills, diaphoresis, fatigue, fever and unexpected weight change.   HENT: Negative.  Negative for congestion, dental problem, drooling, ear discharge, ear pain, facial swelling, hearing loss, mouth sores, nosebleeds, postnasal drip, rhinorrhea, sinus pressure, sneezing, sore throat, tinnitus, trouble swallowing and voice change.    Eyes: Negative.   Negative for photophobia, pain, discharge, redness, itching and visual disturbance.   Respiratory: Negative.  Negative for apnea, cough, choking, chest tightness, shortness of breath, wheezing and stridor.    Cardiovascular: Negative.  Negative for chest pain, palpitations and leg swelling.   Gastrointestinal: Negative.  Negative for abdominal distention, abdominal pain, anal bleeding, blood in stool, constipation, diarrhea, nausea, rectal pain and vomiting.   Endocrine: Negative.  Negative for cold intolerance, heat intolerance, polydipsia, polyphagia and polyuria.   Genitourinary: Negative.  Negative for decreased urine volume, difficulty urinating, dysuria, enuresis, flank pain, frequency, genital sores, hematuria and urgency.   Musculoskeletal: Positive for arthralgias. Negative for back pain, gait problem, joint swelling, myalgias, neck pain and neck stiffness.   Skin: Negative.  Negative for color change, pallor, rash and wound.   Allergic/Immunologic: Negative.  Negative for environmental allergies, food allergies and immunocompromised state.   Neurological: Negative.  Negative for dizziness, tremors, seizures, syncope, facial asymmetry, speech difficulty, weakness, light-headedness, numbness and headaches.   Hematological: Negative.  Negative for adenopathy. Does not bruise/bleed easily.   Psychiatric/Behavioral: Negative.  Negative for agitation, behavioral problems, confusion, decreased concentration, dysphoric mood, hallucinations, self-injury, sleep disturbance and suicidal ideas. The patient is not nervous/anxious and is not hyperactive.         Objective      Physical Exam  There were no vitals taken for this visit.    There is no height or weight on file to calculate BMI.    General:   Mental Status:  Alert   Appearance: Cooperative, in no acute distress   Posture: Normal    Assessment and Plan     Diagnoses and all orders for this visit:    1. Primary osteoarthritis of left knee (Primary)  -      Large Joint Arthrocentesis: L knee        1. Primary osteoarthritis of left knee        Procedure Note:  The potential benefits of performing a therapeutic knee joint visco supplementation injection, as well as potential risks (including, but not limited to infection, swelling, pain, bleeding, bruising, nerve/blood vessel damage, and pseudoseptic reaction) have been discussed with the patient.  After informed consent, timeout procedure was performed, and the skin on the left knee was prepped with chlorhexidine soap and alcohol, after which ethyl chloride was applied to the skin at the injection site. Via the anterolateral approach, Orthovisc was injected into the knee joint.  The patient tolerated the procedure well. There were no complications.  Band-Aid was applied to the injection site. Post-procedural instructions discussed with the patient and/or their caregiver.    Return in about 1 week (around 2/22/2021) for Injection.    Scribed for Ned Pizarro MD by HUMBERTO VAZQUEZ.  2/15/2021  08:36 EST    I Ned Pizarro MD have personally performed the services described in this document as scribed by the above individual, and it is both accurate and complete.     Ned Pizarro MD  2/15/2021  09:51 EST

## 2021-02-16 ENCOUNTER — OFFICE VISIT (OUTPATIENT)
Dept: NEUROLOGY | Facility: CLINIC | Age: 86
End: 2021-02-16

## 2021-02-16 DIAGNOSIS — G20 PARKINSON'S DISEASE (HCC): Primary | ICD-10-CM

## 2021-02-16 DIAGNOSIS — G25.2 RESTING TREMOR: ICD-10-CM

## 2021-02-16 PROBLEM — G20.A1 PARKINSON'S DISEASE: Status: ACTIVE | Noted: 2021-02-16

## 2021-02-16 PROCEDURE — 99441 PR PHYS/QHP TELEPHONE EVALUATION 5-10 MIN: CPT | Performed by: NURSE PRACTITIONER

## 2021-02-16 NOTE — PROGRESS NOTES
Subjective:     Patient ID: Raleigh Paredes is a 86 y.o. male.    CC:   Chief Complaint   Patient presents with   • Parkinson's Disease       HPI:   History of Present Illness   Mr. Paredes is seen today via telephone visit with consent    I first saw him several months ago for evaluation of tremor.  He told me he first noticed a tremor in his right arm about 2 years prior, tremor seemed  to be bothersome and that it keeps him from eating well and also he has noticed a significant problem with handwriting.  He felt like his words and letters are becoming much smaller.  He had also noticed a tremor of his lower jaw in the past year as well.  He was concerned as he felt this is progressing in intensity.    He has primary care put him on propranolol about a year ago, he had noticed no improvement in tremor with this medication.  He also takes gabapentin 300 mg a day once at night for peripheral neuropathy, this has not helped his tremor.  He also reported having trouble getting caught up in his feet at times but he denies shuffled gait.  He did complain of right shoulder pain although he reported that this pain has been present for many years, he had a rotator cuff injury that he did not have surgically repaired.  He denied any dysphasia or REM sleep disorder symptoms.  He denied loss of smell.  He feels a bit stiff overall.  He has constipation off and on, he at times has trouble controlling his bladder.  He denied any dizziness upon standing or orthostatic instability  No family history of tremor or Parkinson's disease.  He also denies hallucinations or problems with his memory.  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  He had been on gabapentin and propranolol for over a year with no improvement tremor.  We did discuss  options and trialing Sinemet which he wanted to do.  He tells me today that the Sinemet has made a significant improvement in his tremor, he has much less noticeable resting tremor and is able to do more on the medication.  He denies any adverse side effects from the medication.  At this point he is taking carbidopa levodopa 25/100 2-3 times a day.  He states that most days he only takes this twice a day and it works well, if his tremor is bad he will take an extra dose midday.  Since he was seen last he has had DaTscan at  which had findings consistent with Parkinson's disease.    Today he tells me he is actually doing really good.  He is continuing to be very active, he has had no falls, no problem with balance, no dysphagia.  He finds it much easier to do things with his hands on the carbidopa levodopa    The following portions of the patient's history were reviewed and updated as appropriate: allergies, current medications, past family history, past medical history, past social history, past surgical history and problem list.    Past Medical History:   Diagnosis Date   • Arthritis     knees and hands    • Borderline diabetes    • Cataract     bilat - may going to have surgery    • Colon polyps 2/16/2017   • Diverticulitis     h/o   • Diverticulosis    • Gout 2/16/2017   • History of kidney stones     x2   • Hypertension    • Hypertensive cardiovascular disease 2/16/2017   • Moderate obesity 2/16/2017   • Palpitations 2/16/2017   • Psoriasis 2/16/2017   • Skin cancer     basal from left arm    • Tinnitus     left    • Wears glasses    • Wears partial dentures     lower        Past Surgical History:   Procedure Laterality Date   • CARDIAC CATHETERIZATION  2000   • COLONOSCOPY     • CYST REMOVAL  1978    Cyst removed from spine, 1978.   • INGUINAL HERNIA REPAIR Bilateral 2009   • KNEE CARTILAGE SURGERY Right 2004    Right knee removal of cartilage, 2004, Dr. Dick.     • SKIN CANCER EXCISION      basal removed  from left forear    • TONSILLECTOMY     • TOTAL KNEE ARTHROPLASTY Right 2018    Procedure: TOTAL KNEE ARTHROPLASTY RIGHT;  Surgeon: Ned Pizarro MD;  Location: [facility];  Service: Orthopedics   • WISDOM TOOTH EXTRACTION      all removed        Social History     Socioeconomic History   • Marital status:      Spouse name: Not on file   • Number of children: Not on file   • Years of education: Not on file   • Highest education level: Not on file   Tobacco Use   • Smoking status: Former Smoker     Packs/day: 1.00     Years: 15.00     Pack years: 15.00     Types: Cigarettes     Quit date: 1965     Years since quittin.1   • Smokeless tobacco: Never Used   Substance and Sexual Activity   • Alcohol use: No   • Drug use: No   • Sexual activity: Defer       Family History   Problem Relation Age of Onset   • Colon cancer Other    • Heart failure Mother    • Colon cancer Father    • Heart failure Father    • Cancer Sister    • Stroke Sister    • Heart attack Brother         Review of Systems   Constitutional: Negative.    HENT: Negative.    Eyes: Negative.    Respiratory: Negative.    Cardiovascular: Negative.    Gastrointestinal: Negative.    Endocrine: Negative.    Genitourinary: Negative.    Musculoskeletal: Negative.    Neurological: Positive for tremors. Negative for dizziness, seizures, syncope, facial asymmetry, speech difficulty, weakness, light-headedness, numbness and headaches.   Psychiatric/Behavioral: Negative.         Objective:  There were no vitals taken for this visit.    Neurologic Exam     Mental Status   Exam is limited as this is a telephone visit.  Patient is alert and oriented, speech is clear and articulate       Physical Exam    Assessment/Plan:       Diagnoses and all orders for this visit:    1. Parkinson's disease (CMS/[facility]) (Primary)    2. Resting tremor  -     carbidopa-levodopa (SINEMET)  MG per tablet; Take 1 pill PO 2-3 times per day  Dispense: 270 tablet;  Refill: 1    Mr. Paredes has evidence of Parkinson's disease on DaTscan with associated parkinsonian features.  He has responded very well to carbidopa levodopa 25/100 mg, he is taking this mostly twice a day sometimes will add a midday dose.  He thinks the medication has made significant improvements and is having no adverse side effects.  At this time is going to continue current dose, I will see him back in about 3 months but certainly he is to call me if he has any questions concerns or problems prior to his follow-up appointment.  Total time of telephone visit 7 minutes         Reviewed medications, potential side effects and signs and symptoms to report. Discussed risk versus benefits of treatment plan with patient and/or family-including medications, labs and radiology that may be ordered. Addressed questions and concerns during visit. Patient and/or family verbalized understanding and agree with plan.      During this visit the following were done:  Labs Reviewed []    Labs Ordered []    Radiology Reports Reviewed []    Radiology Ordered []    PCP Records Reviewed []    Referring Provider Records Reviewed []    ER Records Reviewed []    Hospital Records Reviewed []    History Obtained From Family []    Radiology Images Reviewed []    Other Reviewed []    Records Requested []      Jemima Valdez, APRN  2/16/2021

## 2021-02-22 ENCOUNTER — CLINICAL SUPPORT (OUTPATIENT)
Dept: ORTHOPEDIC SURGERY | Facility: CLINIC | Age: 86
End: 2021-02-22

## 2021-02-22 DIAGNOSIS — M17.12 PRIMARY OSTEOARTHRITIS OF LEFT KNEE: Primary | ICD-10-CM

## 2021-02-22 PROCEDURE — 20610 DRAIN/INJ JOINT/BURSA W/O US: CPT | Performed by: ORTHOPAEDIC SURGERY

## 2021-02-22 NOTE — PROGRESS NOTES
Procedure   Large Joint Arthrocentesis: L knee  Date/Time: 2/22/2021 8:07 AM  Consent given by: patient  Site marked: site marked  Timeout: Immediately prior to procedure a time out was called to verify the correct patient, procedure, equipment, support staff and site/side marked as required   Supporting Documentation  Indications: pain   Procedure Details  Location: knee - L knee  Preparation: Patient was prepped and draped in the usual sterile fashion  Needle size: 22 G  Medications administered: 30 mg Hyaluronan 30 MG/2ML  Patient tolerance: patient tolerated the procedure well with no immediate complications

## 2021-02-22 NOTE — PROGRESS NOTES
[facility] [place] Orthopaedic Surgery Clinic Note    Subjective     Chief Complaint   Patient presents with   • Left Knee - Follow-up     orthovisc #3        HPI    Raleigh Paredes is a 86 y.o. male who presents for his 3rd and final Orthovisc injection. Overall, he believes his left knee has improved as compared to before beginning the Orthovisc injection series; however, he is still experiencing some pain.     Patient Active Problem List   Diagnosis   • Hypertensive cardiovascular disease   • Palpitations   • Gout   • Moderate obesity   • Psoriasis   • Colon polyps   • Primary osteoarthritis of right knee   • HTN (hypertension)   • Status post total right knee replacement   • DM (diabetes mellitus) (CMS/HCC)- new dx   • Leukocytosis, mild, likely reactive   • Acute postoperative pain   • Parkinson's disease (CMS/HCC)     Past Medical History:   Diagnosis Date   • Arthritis     knees and hands    • Borderline diabetes    • Cataract     bilat - may going to have surgery    • Colon polyps 2/16/2017   • Diverticulitis     h/o   • Diverticulosis    • Gout 2/16/2017   • History of kidney stones     x2   • Hypertension    • Hypertensive cardiovascular disease 2/16/2017   • Moderate obesity 2/16/2017   • Palpitations 2/16/2017   • Psoriasis 2/16/2017   • Skin cancer     basal from left arm    • Tinnitus     left    • Wears glasses    • Wears partial dentures     lower       Past Surgical History:   Procedure Laterality Date   • CARDIAC CATHETERIZATION  2000   • COLONOSCOPY     • CYST REMOVAL  1978    Cyst removed from spine, 1978.   • INGUINAL HERNIA REPAIR Bilateral 2009   • KNEE CARTILAGE SURGERY Right 2004    Right knee removal of cartilage, 2004, Dr. Dick.     • SKIN CANCER EXCISION      basal removed from left forear    • TONSILLECTOMY     • TOTAL KNEE ARTHROPLASTY Right 11/29/2018    Procedure: TOTAL KNEE ARTHROPLASTY RIGHT;  Surgeon: Ned Pizarro MD;  Location: [facility];  Service: Orthopedics   • WISDOM TOOTH  EXTRACTION      all removed       Family History   Problem Relation Age of Onset   • Colon cancer Other    • Heart failure Mother    • Colon cancer Father    • Heart failure Father    • Cancer Sister    • Stroke Sister    • Heart attack Brother      Social History     Socioeconomic History   • Marital status:      Spouse name: Not on file   • Number of children: Not on file   • Years of education: Not on file   • Highest education level: Not on file   Tobacco Use   • Smoking status: Former Smoker     Packs/day: 1.00     Years: 15.00     Pack years: 15.00     Types: Cigarettes     Quit date: 1965     Years since quittin.1   • Smokeless tobacco: Never Used   Substance and Sexual Activity   • Alcohol use: No   • Drug use: No   • Sexual activity: Defer      Current Outpatient Medications on File Prior to Visit   Medication Sig Dispense Refill   • allopurinol (ZYLOPRIM) 300 MG tablet Take 300 mg by mouth Daily.     • carbidopa-levodopa (SINEMET)  MG per tablet Take 1 pill PO 2-3 times per day 270 tablet 1   • gabapentin (NEURONTIN) 300 MG capsule Take 300 mg by mouth Every Night.     • glipiZIDE (GLUCOTROL) 5 MG tablet      • lisinopril-hydrochlorothiazide (PRINZIDE,ZESTORETIC) 20-12.5 MG per tablet Take 1 tablet by mouth Daily.       No current facility-administered medications on file prior to visit.       Allergies   Allergen Reactions   • Penicillins Itching     Hand edema        Review of Systems   Constitutional: Negative.    HENT: Negative.    Eyes: Negative.    Respiratory: Negative.    Cardiovascular: Negative.    Gastrointestinal: Negative.    Endocrine: Negative.    Genitourinary: Negative.    Musculoskeletal: Positive for arthralgias.   Skin: Negative.    Allergic/Immunologic: Negative.    Neurological: Negative.    Hematological: Negative.    Psychiatric/Behavioral: Negative.         Objective      Physical Exam  There were no vitals taken for this visit.    There is no height or weight  on file to calculate BMI.    General:   Mental Status:  Alert   Appearance: Cooperative, in no acute distress   Posture: Normal    Assessment and Plan     Diagnoses and all orders for this visit:    1. Primary osteoarthritis of left knee (Primary)  -     Large Joint Arthrocentesis: L knee        1. Primary osteoarthritis of left knee        Procedure Note:  The potential benefits of performing a therapeutic knee joint visco supplementation injection, as well as potential risks (including, but not limited to infection, swelling, pain, bleeding, bruising, nerve/blood vessel damage, and pseudoseptic reaction) have been discussed with the patient.  After informed consent, timeout procedure was performed, and the skin on the left knee was prepped with chlorhexidine soap and alcohol, after which ethyl chloride was applied to the skin at the injection site. Via the anterolateral approach, Orthovisc was injected into the knee joint.  The patient tolerated the procedure well. There were no complications.  Band-Aid was applied to the injection site. Post-procedural instructions discussed with the patient and/or their caregiver.    Return in about 6 months (around 8/22/2021).    Scribed for Ned Pizarro MD by HUMBERTO VAZQUEZ.  02/22/21   08:22 EST    I have personally performed the services described in this document as scribed by the above individual, and it is both accurate and complete.  Ned Pizarro MD  2/22/2021  08:26 EST

## 2021-03-30 ENCOUNTER — TELEPHONE (OUTPATIENT)
Dept: ORTHOPEDIC SURGERY | Facility: CLINIC | Age: 86
End: 2021-03-30

## 2021-03-30 NOTE — TELEPHONE ENCOUNTER
I spoke with Indy.  Her Dad's knee is hurting worse than before. I did advise her that we are unable to try additional injections for 3 (cortisone) to six months (gel).  She asked if there is any other medications he can try. She said there is no contraindications for NSAIDS, I recommended Voltaren gel OTC or try oral NSAIDS as tolerated. We also scheduled a 3 month follow up for Dr. Pizarro to see sooner.

## 2021-03-30 NOTE — TELEPHONE ENCOUNTER
Caller:  JANA- DAUGHTER     Reason for call:  PATIENT HAD COMPLETED SERIES OF 3 INJ WITH L KNEE. HE IS STILL EXPERIENCING KNEE PAIN AND WOULD LIKE TO KNOW SUGGESTIONS OR NEXT STEPS FOR RELIEF       PLEASE ADVISE,     Caller# [phone]

## 2021-05-14 ENCOUNTER — OFFICE VISIT (OUTPATIENT)
Dept: NEUROLOGY | Facility: CLINIC | Age: 86
End: 2021-05-14

## 2021-05-14 VITALS
DIASTOLIC BLOOD PRESSURE: 70 MMHG | HEIGHT: 62 IN | HEART RATE: 63 BPM | TEMPERATURE: 96.9 F | SYSTOLIC BLOOD PRESSURE: 122 MMHG | WEIGHT: 195 LBS | BODY MASS INDEX: 35.88 KG/M2 | OXYGEN SATURATION: 96 %

## 2021-05-14 DIAGNOSIS — G20 PARKINSON'S DISEASE (HCC): Primary | ICD-10-CM

## 2021-05-14 DIAGNOSIS — G25.2 RESTING TREMOR: ICD-10-CM

## 2021-05-14 PROCEDURE — 99213 OFFICE O/P EST LOW 20 MIN: CPT | Performed by: NURSE PRACTITIONER

## 2021-05-14 NOTE — PROGRESS NOTES
Subjective:     Patient ID: Raleigh Paredes is a 86 y.o. male.    CC:   Chief Complaint   Patient presents with   • Tremors       HPI:   History of Present Illness     Mr. Paredes is seen today for follow up.     I first saw him late 2020 ago for evaluation of tremor.  He told me he first noticed a tremor in his right arm about 2 years prior, tremor seemed  to be bothersome and that it keeps him from eating well and also he has noticed a significant problem with handwriting.  He felt like his words and letters are becoming much smaller.  He had also noticed a tremor of his lower jaw in the past year as well.  He was concerned as he felt this is progressing in intensity.    He has primary care put him on propranolol about a year prior, he had noticed no improvement in tremor with this medication.  He also takes gabapentin 300 mg a day once at night for peripheral neuropathy, this has not helped his tremor.  He also reported having trouble getting caught up in his feet at times but he denies shuffled gait.  He did complain of right shoulder pain although he reported that this pain has been present for many years, he had a rotator cuff injury that he did not have surgically repaired.  He denied any dysphasia or REM sleep disorder symptoms.  He denied loss of smell.  He feels a bit stiff overall.  He has constipation off and on, he at times has trouble controlling his bladder.  He denied any dizziness upon standing or orthostatic instability  No family history of tremor or Parkinson's disease.  He also denies hallucinations or problems with his memory.  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  He had been on gabapentin and propranolol for over a year with no improvement tremor.  We did discuss options and trialing Sinemet  which he wanted to do. He has significant improvement in his tremor with initiation of carbidopa levodopa, he is now up to 25/103 times a day.  Occasionally he can tell when the medication wears off but he admits he is not taking this on empty stomach.  He did have DaTscan at  which showed findings consistent with Parkinson's disease.  He tells me that he is getting around fine, he just put out a very large garden.  He walks with a cane, he has had a couple falls in the past year but not any recently at all.  He denies any weakness, dizziness headaches.  The following portions of the patient's history were reviewed and updated as appropriate: allergies, current medications, past family history, past medical history, past social history, past surgical history and problem list.    Past Medical History:   Diagnosis Date   • Arthritis     knees and hands    • Borderline diabetes    • Cataract     bilat - may going to have surgery    • Colon polyps 2/16/2017   • Diverticulitis     h/o   • Diverticulosis    • Gout 2/16/2017   • History of kidney stones     x2   • Hypertension    • Hypertensive cardiovascular disease 2/16/2017   • Moderate obesity 2/16/2017   • Palpitations 2/16/2017   • Psoriasis 2/16/2017   • Skin cancer     basal from left arm    • Tinnitus     left    • Wears glasses    • Wears partial dentures     lower        Past Surgical History:   Procedure Laterality Date   • CARDIAC CATHETERIZATION  2000   • COLONOSCOPY     • CYST REMOVAL  1978    Cyst removed from spine, 1978.   • INGUINAL HERNIA REPAIR Bilateral 2009   • KNEE CARTILAGE SURGERY Right 2004    Right knee removal of cartilage, 2004, Dr. Dick.     • SKIN CANCER EXCISION      basal removed from left forear    • TONSILLECTOMY     • TOTAL KNEE ARTHROPLASTY Right 11/29/2018    Procedure: TOTAL KNEE ARTHROPLASTY RIGHT;  Surgeon: Ned Pizarro MD;  Location: [facility];  Service: Orthopedics   • WISDOM TOOTH EXTRACTION      all removed   "      Social History     Socioeconomic History   • Marital status:      Spouse name: Not on file   • Number of children: Not on file   • Years of education: Not on file   • Highest education level: Not on file   Tobacco Use   • Smoking status: Former Smoker     Packs/day: 1.00     Years: 15.00     Pack years: 15.00     Types: Cigarettes     Quit date: 1965     Years since quittin.4   • Smokeless tobacco: Never Used   Substance and Sexual Activity   • Alcohol use: No   • Drug use: No   • Sexual activity: Defer       Family History   Problem Relation Age of Onset   • Colon cancer Other    • Heart failure Mother    • Colon cancer Father    • Heart failure Father    • Cancer Sister    • Stroke Sister    • Heart attack Brother         Review of Systems   Constitutional: Negative.    HENT: Negative.  Negative for tinnitus, trouble swallowing and voice change.    Eyes: Negative.    Respiratory: Negative.    Cardiovascular: Negative.    Gastrointestinal: Negative.    Endocrine: Negative.    Genitourinary: Negative.    Musculoskeletal: Negative.    Skin: Negative.    Allergic/Immunologic: Negative.    Neurological: Positive for tremors. Negative for dizziness, seizures, syncope, facial asymmetry, speech difficulty, weakness, light-headedness, numbness and headaches.   Hematological: Negative.    Psychiatric/Behavioral: Negative.         Objective:  /70   Pulse 63   Temp 96.9 °F (36.1 °C)   Ht 157.5 cm (62\")   Wt 88.5 kg (195 lb)   SpO2 96%   BMI 35.67 kg/m²     Neurologic Exam     Mental Status   Oriented to person, place, and time.   Attention: normal. Concentration: normal.   Speech: speech is normal   Level of consciousness: alert  Knowledge: consistent with education.   Able to read. Able to write. Normal comprehension.   Masked face     Cranial Nerves   Cranial nerves II through XII intact.     CN II   Visual fields full to confrontation.   Right visual field deficit: none  Left visual field " deficit: none     CN III, IV, VI   Extraocular motions are normal.   Right pupil: Shape: regular. Reactivity: brisk. Consensual response: intact. Accommodation: intact.   Left pupil: Shape: regular. Reactivity: brisk. Consensual response: intact. Accommodation: intact.   CN III: no CN III palsy  CN VI: no CN VI palsy  Nystagmus: none   Upgaze: normal  Downgaze: normal    CN V   Facial sensation intact.     CN VII   Facial expression full, symmetric.     CN VIII   CN VIII normal.     CN IX, X   CN IX normal.   CN X normal.     CN XI   CN XI normal.     CN XII   CN XII normal.     Motor Exam   Muscle bulk: normal  Overall muscle tone: normal  Right arm pronator drift: absent  Left arm pronator drift: absent    Strength   Strength 5/5 throughout. Mild rigidity right arm     Sensory Exam   Light touch normal.     Gait, Coordination, and Reflexes     Gait  Gait: (Walks with cane, no shuffling, decreased arm swing bilaterally)    Coordination   Finger to nose coordination: normalHe has a mild resting tremor right upper extremity and lower jaw.  This is greatly reduced from previous visits.  Patient reports that he feels his tremor is much improved and stable       Physical Exam  Vitals reviewed.   Constitutional:       General: He is not in acute distress.     Appearance: He is well-developed. He is not ill-appearing or toxic-appearing.   HENT:      Head: Normocephalic and atraumatic.      Mouth/Throat:      Mouth: Mucous membranes are moist.   Eyes:      General: No scleral icterus.     Extraocular Movements: Extraocular movements intact and EOM normal.      Conjunctiva/sclera: Conjunctivae normal.   Pulmonary:      Effort: Pulmonary effort is normal. No respiratory distress.   Musculoskeletal:      Cervical back: Normal range of motion and neck supple.   Skin:     General: Skin is warm.      Capillary Refill: Capillary refill takes less than 2 seconds.   Neurological:      Mental Status: He is alert and oriented to  person, place, and time.      Coordination: Finger-Nose-Finger Test normal.      Deep Tendon Reflexes: Strength normal.   Psychiatric:         Mood and Affect: Mood normal.         Speech: Speech normal.         Behavior: Behavior normal.         Thought Content: Thought content normal.         Judgment: Judgment normal.         Assessment/Plan:       Diagnoses and all orders for this visit:    1. Parkinson's disease (CMS/[facility]) (Primary)  -     carbidopa-levodopa (SINEMET)  MG per tablet; Take 1 tablet by mouth 3 (Three) Times a Day.  Dispense: 90 tablet; Refill: 5    2. Resting tremor    Tremor greatly improved with Sinemet, he does not feel like medication needs to be increased in frequency or dosage at this time.  He is encouraged to take his medication either 30 minutes before or 1 to 2 hours after eating for full efficacy.  He is encouraged to exercise fall precautions, declines physical therapy today.  I will see him back in about 4 months but I have asked him to notify me if he has problems with falls or any concerns or problems prior to his follow-up visit           Reviewed medications, potential side effects and signs and symptoms to report. Discussed risk versus benefits of treatment plan with patient and/or family-including medications, labs and radiology that may be ordered. Addressed questions and concerns during visit. Patient and/or family verbalized understanding and agree with plan.    AS THE PROVIDER, I PERSONALLY WORE PPE DURING ENTIRE FACE TO FACE ENCOUNTER IN CLINIC WITH THE PATIENT. PATIENT ALSO WORE PPE DURING ENTIRE FACE TO FACE ENCOUNTER EXCEPT FOR A MAX OF 30 SECONDS DURING NEUROLOGICAL EVALUATION OF CRANIAL NERVES AND THEN MASK WAS PLACED BACK OVER PATIENT FACE FOR REMAINDER OF VISIT. I WASHED MY HANDS BEFORE AND AFTER VISIT.        Jemima Valdez, APRN  5/14/2021

## 2021-05-26 ENCOUNTER — OFFICE VISIT (OUTPATIENT)
Dept: ORTHOPEDIC SURGERY | Facility: CLINIC | Age: 86
End: 2021-05-26

## 2021-05-26 VITALS
DIASTOLIC BLOOD PRESSURE: 76 MMHG | HEIGHT: 62 IN | HEART RATE: 49 BPM | WEIGHT: 195.11 LBS | BODY MASS INDEX: 35.9 KG/M2 | SYSTOLIC BLOOD PRESSURE: 136 MMHG

## 2021-05-26 DIAGNOSIS — M17.12 PRIMARY OSTEOARTHRITIS OF LEFT KNEE: Primary | ICD-10-CM

## 2021-05-26 PROCEDURE — 99212 OFFICE O/P EST SF 10 MIN: CPT | Performed by: ORTHOPAEDIC SURGERY

## 2021-05-26 NOTE — PROGRESS NOTES
[facility]    Subjective     Chief Complaint   Patient presents with   • Left Knee - Follow-up     3 month f/u, last Orthovisc  injection 02/22/21        HPI    Raleigh Paredes is a 86 y.o. male who follows up for his left knee.     He has a known history of arthritis and had a viscosupplementation injection series completed on 02/22/2021. He is here today for routine follow-up. The viscosupplementation injections helped his left knee some; however, they worked better than the cortisone injections. He continues to have pain on the medial aspect. He has been using Voltaren gel, which has been beneficial.  He is thinking about doing surgery in the fall, around September or October.    I have reviewed the following portions of the patient's history and agree with: History of Present Illness and Review of Systems    Patient Active Problem List   Diagnosis   • Hypertensive cardiovascular disease   • Palpitations   • Gout   • Moderate obesity   • Psoriasis   • Colon polyps   • Primary osteoarthritis of right knee   • HTN (hypertension)   • Status post total right knee replacement   • DM (diabetes mellitus) (CMS/HCC)- new dx   • Leukocytosis, mild, likely reactive   • Acute postoperative pain   • Parkinson's disease (CMS/HCC)     Past Medical History:   Diagnosis Date   • Arthritis     knees and hands    • Borderline diabetes    • Cataract     bilat - may going to have surgery    • Colon polyps 2/16/2017   • Diverticulitis     h/o   • Diverticulosis    • Gout 2/16/2017   • History of kidney stones     x2   • Hypertension    • Hypertensive cardiovascular disease 2/16/2017   • Moderate obesity 2/16/2017   • Palpitations 2/16/2017   • Psoriasis 2/16/2017   • Skin cancer     basal from left arm    • Tinnitus     left    • Wears glasses    • Wears partial dentures     lower       Past Surgical History:   Procedure Laterality Date   • CARDIAC CATHETERIZATION  2000   • COLONOSCOPY     • CYST REMOVAL       Cyst removed from spine, .   • INGUINAL HERNIA REPAIR Bilateral 2009   • KNEE CARTILAGE SURGERY Right     Right knee removal of cartilage, , Dr. Dick.     • SKIN CANCER EXCISION      basal removed from left forear    • TONSILLECTOMY     • TOTAL KNEE ARTHROPLASTY Right 2018    Procedure: TOTAL KNEE ARTHROPLASTY RIGHT;  Surgeon: Ned Pizarro MD;  Location: [facility];  Service: Orthopedics   • WISDOM TOOTH EXTRACTION      all removed       Family History   Problem Relation Age of Onset   • Colon cancer Other    • Heart failure Mother    • Colon cancer Father    • Heart failure Father    • Cancer Sister    • Stroke Sister    • Heart attack Brother      Social History     Socioeconomic History   • Marital status:      Spouse name: Not on file   • Number of children: Not on file   • Years of education: Not on file   • Highest education level: Not on file   Tobacco Use   • Smoking status: Former Smoker     Packs/day: 1.00     Years: 15.00     Pack years: 15.00     Types: Cigarettes     Quit date: 1965     Years since quittin.4   • Smokeless tobacco: Never Used   Substance and Sexual Activity   • Alcohol use: No   • Drug use: No   • Sexual activity: Defer      Current Outpatient Medications on File Prior to Visit   Medication Sig Dispense Refill   • allopurinol (ZYLOPRIM) 300 MG tablet Take 300 mg by mouth Daily.     • carbidopa-levodopa (SINEMET)  MG per tablet Take 1 tablet by mouth 3 (Three) Times a Day. 90 tablet 5   • gabapentin (NEURONTIN) 300 MG capsule Take 300 mg by mouth Every Night.     • glipiZIDE (GLUCOTROL) 5 MG tablet Take 5 mg by mouth Daily.     • lisinopril-hydrochlorothiazide (PRINZIDE,ZESTORETIC) 20-12.5 MG per tablet Take 1 tablet by mouth Daily.       No current facility-administered medications on file prior to visit.      Allergies   Allergen Reactions   • Penicillins Itching     Hand edema        Review of Systems   Constitutional: Negative.   Negative for activity change, appetite change, chills, diaphoresis, fatigue, fever and unexpected weight change.   HENT: Negative.  Negative for congestion, dental problem, drooling, ear discharge, ear pain, facial swelling, hearing loss, mouth sores, nosebleeds, postnasal drip, rhinorrhea, sinus pressure, sneezing, sore throat, tinnitus, trouble swallowing and voice change.    Eyes: Negative.  Negative for photophobia, pain, discharge, redness, itching and visual disturbance.   Respiratory: Negative.  Negative for apnea, cough, choking, chest tightness, shortness of breath, wheezing and stridor.    Cardiovascular: Negative.  Negative for chest pain, palpitations and leg swelling.   Gastrointestinal: Negative.  Negative for abdominal distention, abdominal pain, anal bleeding, blood in stool, constipation, diarrhea, nausea, rectal pain and vomiting.   Endocrine: Negative.  Negative for cold intolerance, heat intolerance, polydipsia, polyphagia and polyuria.   Genitourinary: Negative.  Negative for decreased urine volume, difficulty urinating, dysuria, enuresis, flank pain, frequency, genital sores, hematuria and urgency.   Musculoskeletal: Positive for arthralgias. Negative for back pain, gait problem, joint swelling, myalgias, neck pain and neck stiffness.   Skin: Negative.  Negative for color change, pallor, rash and wound.   Allergic/Immunologic: Negative.  Negative for environmental allergies, food allergies and immunocompromised state.   Neurological: Negative.  Negative for dizziness, tremors, seizures, syncope, facial asymmetry, speech difficulty, weakness, light-headedness, numbness and headaches.   Hematological: Negative.  Negative for adenopathy. Does not bruise/bleed easily.   Psychiatric/Behavioral: Negative.  Negative for agitation, behavioral problems, confusion, decreased concentration, dysphoric mood, hallucinations, self-injury, sleep disturbance and suicidal ideas. The patient is not  "nervous/anxious and is not hyperactive.         Objective      Physical Exam  /76   Pulse (!) 49   Ht 157.5 cm (62.01\")   Wt 88.5 kg (195 lb 1.7 oz)   BMI 35.68 kg/m²     Body mass index is 35.68 kg/m².    General:   Mental Status:  Alert   Appearance: Cooperative, in no acute distress   Build and Nutrition: Well-nourished well-developed male   Orientation: Alert and oriented to person, place and time   Posture: Normal   Gait: With a cane    Integument  • Left knee: No skin lesions, rash, or ecchymosis.    Lower Extremities  • Left Knee:  • Tenderness: Medial joint line tenderness.  • Swelling: None  • Effusion: 1+  • Crepitus: Soft.  • Atrophy: None  • Range of motion:  • Extension: 0°  • Flexion: 125°  • Instability: No varus or valgus laxity. Negative anterior drawer.  • Deformities: None    Imaging/Studies  No new radiographs.      Assessment and Plan     Diagnoses and all orders for this visit:    1. Primary osteoarthritis of left knee (Primary)        1. Primary osteoarthritis of left knee        I reviewed my findings with the patient today.  He is considering surgery around September or October of this year, and would like to come back and see me in 2 months to schedule if appropriate.  We will obtain new x-rays on that visit.  I will see him back sooner for any problems.    Return in about 2 months (around 7/26/2021) for Recheck with X-Rays.      Scribed for Ned Pizarro MD by Renan Galarza.  05/26/21   11:52 EDT    I have personally performed the services described in this document as scribed by the above individual, and it is both accurate and complete.  Ned Pizarro MD  5/26/2021  12:16 EDT     "

## 2021-07-26 ENCOUNTER — OFFICE VISIT (OUTPATIENT)
Dept: ORTHOPEDIC SURGERY | Facility: CLINIC | Age: 86
End: 2021-07-26

## 2021-07-26 VITALS
BODY MASS INDEX: 36.25 KG/M2 | SYSTOLIC BLOOD PRESSURE: 126 MMHG | DIASTOLIC BLOOD PRESSURE: 66 MMHG | HEART RATE: 57 BPM | HEIGHT: 62 IN | WEIGHT: 197 LBS

## 2021-07-26 DIAGNOSIS — M17.12 PRIMARY OSTEOARTHRITIS OF LEFT KNEE: Primary | ICD-10-CM

## 2021-07-26 PROCEDURE — 99214 OFFICE O/P EST MOD 30 MIN: CPT | Performed by: ORTHOPAEDIC SURGERY

## 2021-07-26 RX ORDER — MELOXICAM 7.5 MG/1
15 TABLET ORAL ONCE
Status: CANCELLED | OUTPATIENT
Start: 2021-07-26 | End: 2021-07-26

## 2021-07-26 RX ORDER — ACETAMINOPHEN 325 MG/1
1000 TABLET ORAL ONCE
Status: CANCELLED | OUTPATIENT
Start: 2021-07-26 | End: 2021-07-26

## 2021-07-26 RX ORDER — PREGABALIN 150 MG/1
150 CAPSULE ORAL ONCE
Status: CANCELLED | OUTPATIENT
Start: 2021-07-26 | End: 2021-07-26

## 2021-07-26 NOTE — PROGRESS NOTES
[facility]    Subjective     Chief Complaint   Patient presents with   • Follow-up     2 months follow up for Primary osteoarthritis of left knee         HPI    Raleigh Paredes is a 86 y.o. male who follows up for left knee. He is accompanied by his youngest daughter, Lashaun, today. The patient has a known history of arthritis and has been having pain for over 1 year now. We have done previous injections without long-term benefit. We did an injection on his last visit. He returns today and is interested in discussing surgery. The patient rates his pain on average as 7 to 8 out of 10 and reports progressive worsening. He has some swelling and pain with walking, standing, sitting, and climbing stairs. He has pain at night as well. He would like to schedule a left total knee arthroplasty for the first week of 11/2021.    The patient had a great response to his right knee arthroplasty in 12/2018. He denies any history of clots or clotting problems. He takes blood pressure medication but no anticoagulants.    I have reviewed the following portions of the patient's history and agree with: History of Present Illness and Review of Systems    Patient Active Problem List   Diagnosis   • Hypertensive cardiovascular disease   • Palpitations   • Gout   • Moderate obesity   • Psoriasis   • Colon polyps   • Primary osteoarthritis of right knee   • HTN (hypertension)   • Status post total right knee replacement   • DM (diabetes mellitus) (CMS/HCC)- new dx   • Leukocytosis, mild, likely reactive   • Acute postoperative pain   • Parkinson's disease (CMS/HCC)   • Primary osteoarthritis of left knee     Past Medical History:   Diagnosis Date   • Arthritis     knees and hands    • Borderline diabetes    • Cataract     bilat - may going to have surgery    • Colon polyps 2/16/2017   • Diverticulitis     h/o   • Diverticulosis    • Gout 2/16/2017   • History of kidney stones     x2   • Hypertension    •  Hypertensive cardiovascular disease 2017   • Moderate obesity 2017   • Palpitations 2017   • Psoriasis 2017   • Skin cancer     basal from left arm    • Tinnitus     left    • Wears glasses    • Wears partial dentures     lower       Past Surgical History:   Procedure Laterality Date   • CARDIAC CATHETERIZATION     • COLONOSCOPY     • CYST REMOVAL      Cyst removed from spine, .   • INGUINAL HERNIA REPAIR Bilateral    • KNEE CARTILAGE SURGERY Right     Right knee removal of cartilage, , Dr. Dick.     • SKIN CANCER EXCISION      basal removed from left forear    • TONSILLECTOMY     • TOTAL KNEE ARTHROPLASTY Right 2018    Procedure: TOTAL KNEE ARTHROPLASTY RIGHT;  Surgeon: Ned Pizarro MD;  Location: [facility];  Service: Orthopedics   • WISDOM TOOTH EXTRACTION      all removed       Family History   Problem Relation Age of Onset   • Colon cancer Other    • Heart failure Mother    • Colon cancer Father    • Heart failure Father    • Cancer Sister    • Stroke Sister    • Heart attack Brother      Social History     Socioeconomic History   • Marital status:      Spouse name: Not on file   • Number of children: Not on file   • Years of education: Not on file   • Highest education level: Not on file   Tobacco Use   • Smoking status: Former Smoker     Packs/day: 1.00     Years: 15.00     Pack years: 15.00     Types: Cigarettes     Quit date: 1965     Years since quittin.6   • Smokeless tobacco: Never Used   Substance and Sexual Activity   • Alcohol use: No   • Drug use: No   • Sexual activity: Defer      Current Outpatient Medications on File Prior to Visit   Medication Sig Dispense Refill   • allopurinol (ZYLOPRIM) 300 MG tablet Take 300 mg by mouth Daily.     • carbidopa-levodopa (SINEMET)  MG per tablet Take 1 tablet by mouth 3 (Three) Times a Day. 90 tablet 5   • gabapentin (NEURONTIN) 300 MG capsule Take 300 mg by mouth Every Night.     •  "glipiZIDE (GLUCOTROL) 5 MG tablet Take 5 mg by mouth Daily.     • lisinopril-hydrochlorothiazide (PRINZIDE,ZESTORETIC) 20-12.5 MG per tablet Take 1 tablet by mouth Daily.     • metoprolol tartrate (LOPRESSOR) 25 MG tablet        No current facility-administered medications on file prior to visit.      Allergies   Allergen Reactions   • Penicillins Itching     Hand edema        Review of Systems   Constitutional: Negative.    HENT: Negative.    Eyes: Negative.    Respiratory: Negative.    Cardiovascular: Negative.    Gastrointestinal: Negative.    Endocrine: Negative.    Genitourinary: Negative.    Musculoskeletal: Positive for arthralgias.   Skin: Negative.    Allergic/Immunologic: Negative.    Neurological: Negative.    Hematological: Negative.    Psychiatric/Behavioral: Negative.         Objective      Physical Exam  /66   Pulse 57   Ht 157.5 cm (62.01\")   Wt 89.4 kg (197 lb)   BMI 36.02 kg/m²     Body mass index is 36.02 kg/m².    General:   Mental Status:  Alert   Appearance: Cooperative, in no acute distress   Build and Nutrition: Well-nourished well-developed male   Orientation: Alert and oriented to person, place and time   Posture: Normal   Gait: Antalgic on the left using a cane, varus thrust    Integument  • Left knee: No skin lesions, rash, or ecchymosis.    Lower Extremities  • Left Knee:  • Tenderness: Medial joint line tenderness  • Swelling: None  • Effusion: 1+  • Crepitus: Positive  • Atrophy: None  • Range of motion:  • Extension: 5°  • Flexion: 120°  • Instability: No varus or valgus laxity. Negative anterior drawer.  • Deformities: Varus alignment    Imaging/Studies  Imaging Results (Last 24 Hours)     Procedure Component Value Units Date/Time    XR Knee 4+ View Left [[id number]] Resulted: 07/26/21 0911     Updated: 07/26/21 0911    Narrative:      Left Knee Radiographs  Indication: left knee pain  Views: Standing AP's and skiers of both knees, with lateral and sunrise   views of the " left knee    Comparison: 12/9/2020    Findings:   Bone-on-bone contact medial compartment, collapse along the medial edge of   the medial tibial plateau, with cyst formation, with tricompartmental   osteophytes, varus alignment, worsening compared to previous imaging. No   acute bony abnormalities.            Assessment and Plan     Diagnoses and all orders for this visit:    1. Primary osteoarthritis of left knee (Primary)  -     XR Knee 4+ View Left  -     Case Request; Standing  -     Instructions on coughing, deep breathing, and incentive spirometry.; Future  -     CBC and Differential; Future  -     Basic metabolic panel; Future  -     Protime-INR; Future  -     APTT; Future  -     Hemoglobin A1c; Future  -     Sedimentation rate; Future  -     C-reactive protein; Future  -     Tranexamic Acid 1,000 mg in sodium chloride 0.9 % 100 mL  -     Tranexamic Acid 1,000 mg in sodium chloride 0.9 % 100 mL  -     ceFAZolin (ANCEF) 2 g in sodium chloride 0.9 % 100 mL IVPB  -     acetaminophen (TYLENOL) tablet 975 mg  -     meloxicam (MOBIC) tablet 15 mg  -     pregabalin (LYRICA) capsule 150 mg  -     mupirocin (BACTROBAN) 2 % nasal ointment 1 application  -     Case Request    Other orders  -     Outpatient In A Bed; Standing  -     Follow Anesthesia Guidelines / Standing Orders; Future  -     Obtain informed consent  -     Provide instructions to patient regarding NPO status  -     Chlorhexidine Skin Prep - Educate and Review With Patient; Future  -     Provide Patient With ERAS Hydration Instructions  -     Provide Patient With Enhanced Recovery Booklet(s) or Handout  -     Provide Instructions/Handout For Benzoyl Peroxide 5% Wash If Having Shoulder/Arm Surgery (If Prescribed)  -     Provide Instructions/Handout For Bactroban And Chlorhexidine Shower (If Prescribed)  -     Perform A Memory Screening On All Hip/Knee Replacement Patients >Or Equal To 65 Years Or Older  -     Complete A PROMIS And HOOS Or KOOS Survey If  Having Hip Or Knee Replacement  -     Follow Anesthesia Guidelines / Standing Orders; Standing  -     Nerve Block; Standing  -     Verify NPO Status; Standing  -     Verify The Time Patient Completed ERAS Hydration Drink; Standing  -     SCD (sequential compression device)- to be placed on patient in Pre-op; Standing  -     Clip operative site; Standing  -     Obtain informed consent (if not collected inpatient or PAT); Standing  -     Notify Physician - Standard; Standing  -     Provide Patient With Carbo Loading Instructions  -     Provide Patient With ERAS Booklet(s)/Handout  -     mupirocin (Bactroban Nasal) 2 % nasal ointment; into the nostril(s) as directed by provider 2 (Two) Times a Day.  Dispense: 22 g; Refill: 0  -     chlorhexidine (HIBICLENS) 4 % external liquid; Apply  topically to the appropriate area as directed Daily. Shower with hibiclens solution as directed for 5 days prior to surgery  Dispense: 236 mL; Refill: 0        1. Primary osteoarthritis of left knee        The patient's left knee pain is progressively worsening, and his x-rays show worsening of the pathology. We have performed injections without long-term benefit, and I believe he is a good candidate for a left total knee arthroplasty at this point. He had a great response on the right side in 12/2018 and will hopefully have a similar response on the left side. I informed the patient that he will go to preadmission testing prior to surgery.    Surgical Counseling   I have informed the patient of the diagnosis and the prognosis.  Exhaustive conservative treatment modalities have not resulted in long term pain relief.  The symptoms have progressed to the point of daily pain and inability to perform activities of daily living without significant pain. The patient has reached the point of desiring to proceed with total knee arthroplasty after discussing the risks, benefits and alternatives to the procedure.  The surgical procedure itself was  discussed in detail. Risks of the procedure were discussed, which included but are not limited to, bleeding, infection, damage to blood vessels and nerves, incomplete pain relief, loosening of the prosthesis (early or late), deep infection (early or late), need for further surgery, loss of limb, deep venous thrombosis, pulmonary embolus, death, heart attack, stroke, kidney failure, liver failure, and anesthetic complications.  In addition, the potential for deep infection developing in the future was discussed, which could require further surgery.  The knee would have to be re-opened, debrided, and potentially remove the prosthesis, which may or may not be replaced in the future.  Also, the possibility for loosening of the prosthesis has been mentioned.  If the prosthesis loosened, a revision arthroplasty could be performed, with results that are not as predictable compared to the original procedure.  The typical rehabilitative course has also been discussed, and full recovery may take up to a year to see the maximum benefit.  The importance of patient cooperation in the rehabilitative efforts has also been discussed.  No guarantees were given.  The patient understands the potential risks versus the benefits and desires to proceed with total knee arthroplasty at a mutually convenient time.    Return for surgery.      Scribed for Ned Pizarro MD by Yary Reed.  07/26/21   10:50 EDT    I have personally performed the services described in this document as scribed by the above individual, and it is both accurate and complete.  Ned Pizarro MD  7/26/2021  11:53 EDT

## 2021-09-14 ENCOUNTER — OFFICE VISIT (OUTPATIENT)
Dept: NEUROLOGY | Facility: CLINIC | Age: 86
End: 2021-09-14

## 2021-09-14 VITALS
HEIGHT: 62 IN | SYSTOLIC BLOOD PRESSURE: 130 MMHG | DIASTOLIC BLOOD PRESSURE: 70 MMHG | WEIGHT: 186 LBS | OXYGEN SATURATION: 98 % | BODY MASS INDEX: 34.23 KG/M2 | HEART RATE: 50 BPM | TEMPERATURE: 96.6 F

## 2021-09-14 DIAGNOSIS — G20 PARKINSON'S DISEASE (HCC): ICD-10-CM

## 2021-09-14 PROCEDURE — 99213 OFFICE O/P EST LOW 20 MIN: CPT | Performed by: NURSE PRACTITIONER

## 2021-09-14 NOTE — PROGRESS NOTES
Subjective:     Patient ID: Raleigh Paredes is a 86 y.o. male.    CC:   Chief Complaint   Patient presents with   • Parkinson's Disease       HPI:   History of Present Illness     Mr. Paredes is seen today for follow up.     I first saw him late 2020 ago for evaluation of tremor.  He told me he first noticed a tremor in his right arm about 2 years prior, tremor seemed  to be bothersome and that it keeps him from eating well and also he has noticed a significant problem with handwriting.  He felt like his words and letters are becoming much smaller.  He had also noticed a tremor of his lower jaw in the past year as well.  He was concerned as he felt this is progressing in intensity.    He has primary care put him on propranolol about a year prior, he had noticed no improvement in tremor with this medication.  He also takes gabapentin 300 mg a day once at night for peripheral neuropathy, this has not helped his tremor.  He also reported having trouble getting caught up in his feet at times but he denies shuffled gait.  He did complain of right shoulder pain although he reported that this pain has been present for many years, he had a rotator cuff injury that he did not have surgically repaired.  He denied any dysphasia or REM sleep disorder symptoms.  He denied loss of smell.  He feels a bit stiff overall.  He has constipation off and on, he at times has trouble controlling his bladder.  He denied any dizziness upon standing or orthostatic instability  No family history of tremor or Parkinson's disease.  He also denies hallucinations or problems with his memory.  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  He had been on gabapentin and propranolol for over a year with no improvement tremor.  We did discuss options and trialing  Sinemet which he wanted to do. He had significant improvement in his tremor with initiation of carbidopa levodopa..  He did have [facility] at [facility] which showed findings consistent with Parkinson's disease.      He is currently taking carbidopa levodopa 25/100 4 times a day, this seems to control his tremor and keeps him functional.  He continues to occasionally get caught up in his feet, he is having some knee issues and actually will be having a knee replacement soon.  He has fallen a couple times but reports that he is not injured himself.  He will be starting therapy after his knee replacement.    He does not want to increase dosage today he thinks current dose is working well  The following portions of the patient's history were reviewed and updated as appropriate: allergies, current medications, past family history, past medical history, past social history, past surgical history and problem list.    Past Medical History:   Diagnosis Date   • Arthritis     knees and hands    • Borderline diabetes    • Cataract     bilat - may going to have surgery    • Colon polyps 2/16/2017   • Diverticulitis     h/o   • Diverticulosis    • Gout 2/16/2017   • History of kidney stones     x2   • Hypertension    • Hypertensive cardiovascular disease 2/16/2017   • Moderate obesity 2/16/2017   • Palpitations 2/16/2017   • Psoriasis 2/16/2017   • Skin cancer     basal from left arm    • Tinnitus     left    • Wears glasses    • Wears partial dentures     lower        Past Surgical History:   Procedure Laterality Date   • CARDIAC CATHETERIZATION  2000   • COLONOSCOPY     • CYST REMOVAL  1978    Cyst removed from spine, 1978.   • INGUINAL HERNIA REPAIR Bilateral 2009   • KNEE CARTILAGE SURGERY Right 2004    Right knee removal of cartilage, 2004, Dr. Dick.     • SKIN CANCER EXCISION      basal removed from left forear    • TONSILLECTOMY     • TOTAL KNEE ARTHROPLASTY Right 11/29/2018    Procedure: TOTAL KNEE ARTHROPLASTY RIGHT;  Surgeon:  "Ned Pizarro MD;  Location: [facility];  Service: Orthopedics   • WISDOM TOOTH EXTRACTION      all removed        Social History     Socioeconomic History   • Marital status:      Spouse name: Not on file   • Number of children: Not on file   • Years of education: Not on file   • Highest education level: Not on file   Tobacco Use   • Smoking status: Former Smoker     Packs/day: 1.00     Years: 15.00     Pack years: 15.00     Types: Cigarettes     Quit date: 1965     Years since quittin.7   • Smokeless tobacco: Never Used   Substance and Sexual Activity   • Alcohol use: No   • Drug use: No   • Sexual activity: Defer       Family History   Problem Relation Age of Onset   • Colon cancer Other    • Heart failure Mother    • Colon cancer Father    • Heart failure Father    • Cancer Sister    • Stroke Sister    • Heart attack Brother         Review of Systems   Constitutional: Negative.    HENT: Negative.    Eyes: Negative.    Respiratory: Negative.    Cardiovascular: Negative.    Gastrointestinal: Negative.    Endocrine: Negative.    Genitourinary: Negative.    Musculoskeletal: Negative.    Skin: Negative.    Allergic/Immunologic: Negative.    Neurological: Positive for tremors. Negative for dizziness, seizures, syncope, facial asymmetry, speech difficulty, weakness, light-headedness, numbness and headaches.   Hematological: Negative.    Psychiatric/Behavioral: Negative.         Objective:  /70   Pulse 50   Temp 96.6 °F (35.9 °C)   Ht 157.5 cm (62\")   Wt 84.4 kg (186 lb)   SpO2 98%   BMI 34.02 kg/m²     Neurologic Exam     Mental Status   Oriented to person, place, and time.   Patient is alert and oriented, speech clear and articulate     Cranial Nerves   Cranial nerves II through XII intact.     CN III, IV, VI   Pupils are equal, round, and reactive to light.    Motor Exam He has a mild resting tremor on exam today, he reports his tremor is really well controlled during our exam as he took " his medication shortly before coming in.  Mild bradykinesia with finger tap     Gait, Coordination, and Reflexes     Gait  Gait: (Antalgic gait, walks with cane)    Coordination   Finger to nose coordination: normal (Tremor noted)      Physical Exam  Vitals reviewed.   Constitutional:       Appearance: Normal appearance. He is well-developed. He is obese.   HENT:      Head: Normocephalic and atraumatic.      Mouth/Throat:      Mouth: Mucous membranes are moist.   Eyes:      General: No scleral icterus.     Conjunctiva/sclera: Conjunctivae normal.      Pupils: Pupils are equal, round, and reactive to light.   Pulmonary:      Effort: Pulmonary effort is normal. No respiratory distress.   Musculoskeletal:      Cervical back: Normal range of motion and neck supple.   Skin:     General: Skin is warm.      Capillary Refill: Capillary refill takes less than 2 seconds.   Neurological:      Mental Status: He is alert and oriented to person, place, and time.      Coordination: Finger-Nose-Finger Test (Tremor noted) normal.   Psychiatric:         Mood and Affect: Mood normal.         Behavior: Behavior normal.         Thought Content: Thought content normal.         Judgment: Judgment normal.         Assessment/Plan:       Diagnoses and all orders for this visit:    1. Parkinson's disease (CMS/[facility])  -     carbidopa-levodopa (SINEMET)  MG per tablet; Take 1 tablet by mouth 4 (Four) Times a Day.  Dispense: 360 tablet; Refill: 1    Mr. Garcia reports he is doing very well on Sinemet 25/100 mg 4 times a day.  He does not want to increase dosage, he thinks current dose is working well.  He will continue medication unchanged at this time.  He has had a couple falls but he is also complaining of significant knee issues to the point he is having knee replacement soon.  He is going to be doing some physical therapy for his knee, we will also have them evaluate him for gait/Parkinson's disease if needed.  We will follow-up with  him in 6 months or sooner if needed, fall precautions discussed  He can contact our office with any questions concerns or problems prior to follow-up appointment         Reviewed medications, potential side effects and signs and symptoms to report. Discussed risk versus benefits of treatment plan with patient and/or family-including medications, labs and radiology that may be ordered. Addressed questions and concerns during visit. Patient and/or family verbalized understanding and agree with plan.    AS THE PROVIDER, I PERSONALLY WORE PPE DURING ENTIRE FACE TO FACE ENCOUNTER IN CLINIC WITH THE PATIENT. PATIENT ALSO WORE PPE DURING ENTIRE FACE TO FACE ENCOUNTER EXCEPT FOR A MAX OF 30 SECONDS DURING NEUROLOGICAL EVALUATION OF CRANIAL NERVES AND THEN MASK WAS PLACED BACK OVER PATIENT FACE FOR REMAINDER OF VISIT. I WASHED MY HANDS BEFORE AND AFTER VISIT.    During this visit the following were done:  Labs Reviewed []    Labs Ordered []    Radiology Reports Reviewed []    Radiology Ordered []    PCP Records Reviewed []    Referring Provider Records Reviewed []    ER Records Reviewed []    Hospital Records Reviewed []    History Obtained From Family []    Radiology Images Reviewed []    Other Reviewed []    Records Requested []      Jemima Valdez, APRN  9/14/2021

## 2021-10-05 ENCOUNTER — TELEPHONE (OUTPATIENT)
Dept: ORTHOPEDIC SURGERY | Facility: CLINIC | Age: 86
End: 2021-10-05

## 2021-10-05 NOTE — TELEPHONE ENCOUNTER
Caller:  INDY HACKETT    Relationship to patient: DAUGHTER (IS ON RELEASE)    Best call back number: [phone]     Patient is needing:  Patient daughter, Lashaun Ingram passed away and other daughter, Indy Hackett (ON RELEASE)  is now taking care of patient's medical arrangements/care.  The paperwork for upcoming appointments was lost and request either a call back with the info or have them emailed - with location and #, date/time, etc... in case gets lost.  EMAIL: [email or web address]

## 2021-10-28 ENCOUNTER — ANESTHESIA EVENT CONVERTED (OUTPATIENT)
Dept: ANESTHESIOLOGY | Facility: HOSPITAL | Age: 86
End: 2021-10-28

## 2021-10-28 ENCOUNTER — PRE-ADMISSION TESTING (OUTPATIENT)
Dept: PREADMISSION TESTING | Facility: HOSPITAL | Age: 86
End: 2021-10-28

## 2021-10-28 ENCOUNTER — ANESTHESIA (OUTPATIENT)
Dept: PREOP | Facility: HOSPITAL | Age: 86
End: 2021-10-28

## 2021-10-28 ENCOUNTER — HOSPITAL ENCOUNTER (OUTPATIENT)
Dept: PREOP | Facility: HOSPITAL | Age: 86
Setting detail: OBSERVATION
Discharge: HOME | End: 2021-10-28

## 2021-10-28 ENCOUNTER — ANESTHESIA EVENT (OUTPATIENT)
Dept: PREOP | Facility: HOSPITAL | Age: 86
End: 2021-10-28

## 2021-10-28 VITALS — WEIGHT: 192 LBS | BODY MASS INDEX: 35.33 KG/M2 | HEIGHT: 62 IN

## 2021-10-28 DIAGNOSIS — M17.12 PRIMARY OSTEOARTHRITIS OF LEFT KNEE: ICD-10-CM

## 2021-10-28 LAB
ANION GAP SERPL CALCULATED.3IONS-SCNC: 14 MMOL/L (ref 5–15)
APTT PPP: 34.3 SECONDS (ref 22–39)
BASOPHILS # BLD AUTO: 0.07 10*3/MM3 (ref 0–0.2)
BASOPHILS NFR BLD AUTO: 1 % (ref 0–1.5)
BUN SERPL-MCNC: 28 MG/DL (ref 8–23)
BUN/CREAT SERPL: 20.4 (ref 7–25)
CALCIUM SPEC-SCNC: 9.4 MG/DL (ref 8.6–10.5)
CHLORIDE SERPL-SCNC: 101 MMOL/L (ref 98–107)
CO2 SERPL-SCNC: 25 MMOL/L (ref 22–29)
CREAT SERPL-MCNC: 1.37 MG/DL (ref 0.76–1.27)
CRP SERPL-MCNC: 0.62 MG/DL (ref 0–0.5)
DEPRECATED RDW RBC AUTO: 42.8 FL (ref 37–54)
EOSINOPHIL # BLD AUTO: 0.47 10*3/MM3 (ref 0–0.4)
EOSINOPHIL NFR BLD AUTO: 6.5 % (ref 0.3–6.2)
ERYTHROCYTE [DISTWIDTH] IN BLOOD BY AUTOMATED COUNT: 13.1 % (ref 12.3–15.4)
ERYTHROCYTE [SEDIMENTATION RATE] IN BLOOD: 10 MM/HR (ref 0–20)
GFR SERPL CREATININE-BSD FRML MDRD: 49 ML/MIN/1.73
GLUCOSE SERPL-MCNC: 203 MG/DL (ref 65–99)
HBA1C MFR BLD: 6 % (ref 4.8–5.6)
HCT VFR BLD AUTO: 46 % (ref 37.5–51)
HGB BLD-MCNC: 15.8 G/DL (ref 13–17.7)
IMM GRANULOCYTES # BLD AUTO: 0.03 10*3/MM3 (ref 0–0.05)
IMM GRANULOCYTES NFR BLD AUTO: 0.4 % (ref 0–0.5)
INR PPP: 1.03 (ref 0.85–1.16)
LYMPHOCYTES # BLD AUTO: 1.32 10*3/MM3 (ref 0.7–3.1)
LYMPHOCYTES NFR BLD AUTO: 18.3 % (ref 19.6–45.3)
MCH RBC QN AUTO: 30.7 PG (ref 26.6–33)
MCHC RBC AUTO-ENTMCNC: 34.3 G/DL (ref 31.5–35.7)
MCV RBC AUTO: 89.3 FL (ref 79–97)
MONOCYTES # BLD AUTO: 0.63 10*3/MM3 (ref 0.1–0.9)
MONOCYTES NFR BLD AUTO: 8.7 % (ref 5–12)
NEUTROPHILS NFR BLD AUTO: 4.7 10*3/MM3 (ref 1.7–7)
NEUTROPHILS NFR BLD AUTO: 65.1 % (ref 42.7–76)
NRBC BLD AUTO-RTO: 0 /100 WBC (ref 0–0.2)
PLATELET # BLD AUTO: 189 10*3/MM3 (ref 140–450)
PMV BLD AUTO: 11 FL (ref 6–12)
POTASSIUM SERPL-SCNC: 4.8 MMOL/L (ref 3.5–5.2)
PROTHROMBIN TIME: 13.2 SECONDS (ref 11.4–14.4)
QT INTERVAL: 394 MS
QTC INTERVAL: 380 MS
RBC # BLD AUTO: 5.15 10*6/MM3 (ref 4.14–5.8)
SODIUM SERPL-SCNC: 140 MMOL/L (ref 136–145)
WBC # BLD AUTO: 7.22 10*3/MM3 (ref 3.4–10.8)

## 2021-10-28 PROCEDURE — 36415 COLL VENOUS BLD VENIPUNCTURE: CPT

## 2021-10-28 PROCEDURE — 85730 THROMBOPLASTIN TIME PARTIAL: CPT

## 2021-10-28 PROCEDURE — 86140 C-REACTIVE PROTEIN: CPT

## 2021-10-28 PROCEDURE — 76942 ECHO GUIDE FOR BIOPSY: CPT

## 2021-10-28 PROCEDURE — 93005 ELECTROCARDIOGRAM TRACING: CPT

## 2021-10-28 PROCEDURE — 85652 RBC SED RATE AUTOMATED: CPT

## 2021-10-28 PROCEDURE — 85610 PROTHROMBIN TIME: CPT

## 2021-10-28 PROCEDURE — 80048 BASIC METABOLIC PNL TOTAL CA: CPT

## 2021-10-28 PROCEDURE — 83036 HEMOGLOBIN GLYCOSYLATED A1C: CPT

## 2021-10-28 PROCEDURE — 85025 COMPLETE CBC W/AUTO DIFF WBC: CPT

## 2021-10-28 PROCEDURE — 93010 ELECTROCARDIOGRAM REPORT: CPT | Performed by: INTERNAL MEDICINE

## 2021-10-28 RX ORDER — LIDOCAINE HYDROCHLORIDE 20 MG/ML
INJECTION, SOLUTION EPIDURAL; INFILTRATION; INTRACAUDAL; PERINEURAL
Status: COMPLETED | OUTPATIENT
Start: 2021-10-28 | End: 2021-10-28

## 2021-10-28 RX ORDER — TRIAMCINOLONE ACETONIDE 1 MG/G
CREAM TOPICAL
COMMUNITY
Start: 2021-09-17 | End: 2023-02-13

## 2021-10-28 RX ADMIN — LIDOCAINE HYDROCHLORIDE 20 ML: 20 INJECTION, SOLUTION EPIDURAL; INFILTRATION; INTRACAUDAL; PERINEURAL at 13:48

## 2021-10-28 ASSESSMENT — KOOS JR
KOOS JR SCORE: 14
KOOS JR SCORE: 52.465

## 2021-10-28 NOTE — ANESTHESIA PROCEDURE NOTES
Cryoneurolysis      Performed by  Anesthesiologist: Juan Alberto Castillo MD  CRNA: Simeon Armstrong CRNA  Assisted by: Jillian Mora CRNA  Preanesthetic Checklist  Completed: patient identified, site marked, risks and benefits discussed, surgical consent, pre-op evaluation and timeout performed  Prep:  Pt Position: supine  Leg Position: wedge  Sterile barriers:alcohol skin prep, cap, gloves, mask and washed/disinfected hands  Prep: ChloraPrep  Procedure    Pre procedure Pain Level: 7/10  Sedation: no    Guidance:ultrasound guided  Images:still images obtained, printed/placed on chart    Laterality:left  Location: Knee  Nerves: AFCN (Anterior femoral cutaneous nerve and ISN (Infrapatellar Saphenous Nerve)  Needle Type:Iovera 190      Medications Used: lidocaine PF 2% (XYLOCAINE) injection, 20 mL  Med administered at 10/28/2021 1:48 PM      Medications  Comment:Diagnosis - Knee Pain    After the treatment sites were identified using ultrasound. 2% lidocaine was used to create a skin wheel and infiltrate subcutaneously. A 20ga Ultraplex 360 needle was then passed to the treatment site under ultrasound. 1-3 ml of Lidocaine 2% was infiltrated along the path planned for treatment. Ultrasound was then utilized to visualize the Iovera 190 needle placement. The treatment was started and ice ball formation was noted under ultrasound at the treatment site. This was repeated for each treatment site and still images were obtained.        The AFCN was blocked mid-thigh at 17 cm from the center of the patella.    Branches of the AFCN blocked were anterior branch was less than 2.5 mm, medial branch was less than 2.5 mm and lateral branch was less than 2.5 mm.    One Branch of the ISN were blocked medially near the joint line of the knee.    The ISN was treated above the nerve branch.    Total of 4 branches treated between the AFCN & ISN            Post Assessment  Injection Assessment: Shadowing of ice ball formation noted  Patient  Tolerance:comfortable throughout block and no complaints  Post procedure care: Patient's skin was cleansed and the treatment site was covered with a bandage., The patient was instructed to stand and mobilize the knee joint. and Post treatment assessment performed and discharge instructions given to the patient.  Post procedure pain level: 0/10  Complications:no  Additional Notes  Post pain assessment:     Numbness achieved above the joint line in  zone 1, zone 2 and zone 3  Numbness achieved below the joint line in  zone 4

## 2021-10-28 NOTE — PROGRESS NOTES
Pre-Cryoneurolysis Anesthesia Patient Evaluation    Stiffness:  How severe is your knee stiffness after first waking in the morning?:  Severe    Pain:  Rate the amount of knee pain experienced in the last week while performing the following activities:           Twisting/pivoting on your knee:   Severe       Straightening knee fully:  Mild       Going up or down stairs:  Moderate       Standing upright:  Moderate    Function and Daily Living:  Rate the degree of difficulty you have when engaging in the following activities:           Rising from sitting:  Severe       Bending to the floor to  an object:  Moderate    Additional Comments:

## 2021-11-01 DIAGNOSIS — M17.12 PRIMARY OSTEOARTHRITIS OF LEFT KNEE: Primary | ICD-10-CM

## 2021-11-07 ENCOUNTER — APPOINTMENT (OUTPATIENT)
Dept: PREADMISSION TESTING | Facility: HOSPITAL | Age: 86
End: 2021-11-07

## 2021-11-07 DIAGNOSIS — M17.12 PRIMARY OSTEOARTHRITIS OF LEFT KNEE: ICD-10-CM

## 2021-11-07 LAB — SARS-COV-2 RNA PNL SPEC NAA+PROBE: NOT DETECTED

## 2021-11-07 PROCEDURE — C9803 HOPD COVID-19 SPEC COLLECT: HCPCS

## 2021-11-07 PROCEDURE — U0004 COV-19 TEST NON-CDC HGH THRU: HCPCS

## 2021-11-08 ENCOUNTER — ANESTHESIA EVENT (OUTPATIENT)
Dept: PERIOP | Facility: HOSPITAL | Age: 86
End: 2021-11-08

## 2021-11-08 RX ORDER — FAMOTIDINE 10 MG/ML
20 INJECTION, SOLUTION INTRAVENOUS ONCE
Status: CANCELLED | OUTPATIENT
Start: 2021-11-08 | End: 2021-11-08

## 2021-11-08 RX ORDER — SODIUM CHLORIDE, SODIUM LACTATE, POTASSIUM CHLORIDE, CALCIUM CHLORIDE 600; 310; 30; 20 MG/100ML; MG/100ML; MG/100ML; MG/100ML
9 INJECTION, SOLUTION INTRAVENOUS CONTINUOUS
Status: CANCELLED | OUTPATIENT
Start: 2021-11-08

## 2021-11-09 ENCOUNTER — ANESTHESIA (OUTPATIENT)
Dept: PERIOP | Facility: HOSPITAL | Age: 86
End: 2021-11-09

## 2021-11-09 ENCOUNTER — APPOINTMENT (OUTPATIENT)
Dept: GENERAL RADIOLOGY | Facility: HOSPITAL | Age: 86
End: 2021-11-09

## 2021-11-09 ENCOUNTER — ANESTHESIA EVENT CONVERTED (OUTPATIENT)
Dept: ANESTHESIOLOGY | Facility: HOSPITAL | Age: 86
End: 2021-11-09

## 2021-11-09 ENCOUNTER — HOSPITAL ENCOUNTER (OUTPATIENT)
Facility: HOSPITAL | Age: 86
Discharge: HOME | End: 2021-11-10
Attending: ORTHOPAEDIC SURGERY | Admitting: ORTHOPAEDIC SURGERY

## 2021-11-09 DIAGNOSIS — G89.18 ACUTE POSTOPERATIVE PAIN: ICD-10-CM

## 2021-11-09 DIAGNOSIS — Z96.652 STATUS POST TOTAL LEFT KNEE REPLACEMENT: Primary | ICD-10-CM

## 2021-11-09 DIAGNOSIS — M17.11 PRIMARY OSTEOARTHRITIS OF RIGHT KNEE: ICD-10-CM

## 2021-11-09 DIAGNOSIS — E66.8 MODERATE OBESITY: ICD-10-CM

## 2021-11-09 DIAGNOSIS — R00.2 PALPITATIONS: ICD-10-CM

## 2021-11-09 DIAGNOSIS — G20 PARKINSON'S DISEASE (HCC): ICD-10-CM

## 2021-11-09 DIAGNOSIS — Z96.651 STATUS POST TOTAL RIGHT KNEE REPLACEMENT: ICD-10-CM

## 2021-11-09 DIAGNOSIS — M17.12 PRIMARY OSTEOARTHRITIS OF LEFT KNEE: ICD-10-CM

## 2021-11-09 DIAGNOSIS — L40.9 PSORIASIS: ICD-10-CM

## 2021-11-09 LAB
GLUCOSE BLDC GLUCOMTR-MCNC: 105 MG/DL (ref 70–130)
GLUCOSE BLDC GLUCOMTR-MCNC: 173 MG/DL (ref 70–130)
GLUCOSE BLDC GLUCOMTR-MCNC: 214 MG/DL (ref 70–130)
POTASSIUM SERPL-SCNC: 4.2 MMOL/L (ref 3.5–5.2)

## 2021-11-09 PROCEDURE — A9270 NON-COVERED ITEM OR SERVICE: HCPCS | Performed by: ORTHOPAEDIC SURGERY

## 2021-11-09 PROCEDURE — 63710000001 LISINOPRIL 20 MG TABLET

## 2021-11-09 PROCEDURE — 63710000001 PREGABALIN 150 MG CAPSULE: Performed by: ORTHOPAEDIC SURGERY

## 2021-11-09 PROCEDURE — A9270 NON-COVERED ITEM OR SERVICE: HCPCS

## 2021-11-09 PROCEDURE — 82962 GLUCOSE BLOOD TEST: CPT

## 2021-11-09 PROCEDURE — 27447 TOTAL KNEE ARTHROPLASTY: CPT | Performed by: ORTHOPAEDIC SURGERY

## 2021-11-09 PROCEDURE — 27447 TOTAL KNEE ARTHROPLASTY: CPT | Performed by: PHYSICIAN ASSISTANT

## 2021-11-09 PROCEDURE — C1776 JOINT DEVICE (IMPLANTABLE): HCPCS | Performed by: ORTHOPAEDIC SURGERY

## 2021-11-09 PROCEDURE — 97116 GAIT TRAINING THERAPY: CPT

## 2021-11-09 PROCEDURE — 63710000001 METOPROLOL TARTRATE 25 MG TABLET

## 2021-11-09 PROCEDURE — 25010000002 ROPIVACAINE PER 1 MG: Performed by: ORTHOPAEDIC SURGERY

## 2021-11-09 PROCEDURE — 63710000001 ACETAMINOPHEN 500 MG TABLET: Performed by: ORTHOPAEDIC SURGERY

## 2021-11-09 PROCEDURE — 63710000001 FAMOTIDINE 20 MG TABLET: Performed by: ANESTHESIOLOGY

## 2021-11-09 PROCEDURE — 0 CEFAZOLIN IN DEXTROSE 2-4 GM/100ML-% SOLUTION: Performed by: ORTHOPAEDIC SURGERY

## 2021-11-09 PROCEDURE — A9270 NON-COVERED ITEM OR SERVICE: HCPCS | Performed by: INTERNAL MEDICINE

## 2021-11-09 PROCEDURE — 63710000001 INSULIN DETEMIR PER 5 UNITS

## 2021-11-09 PROCEDURE — 63710000001 GABAPENTIN 300 MG CAPSULE: Performed by: INTERNAL MEDICINE

## 2021-11-09 PROCEDURE — 25010000002 DEXAMETHASONE PER 1 MG: Performed by: NURSE ANESTHETIST, CERTIFIED REGISTERED

## 2021-11-09 PROCEDURE — 25010000002 ROPIVACAINE PER 1 MG: Performed by: NURSE ANESTHETIST, CERTIFIED REGISTERED

## 2021-11-09 PROCEDURE — A9270 NON-COVERED ITEM OR SERVICE: HCPCS | Performed by: ANESTHESIOLOGY

## 2021-11-09 PROCEDURE — C1713 ANCHOR/SCREW BN/BN,TIS/BN: HCPCS | Performed by: ORTHOPAEDIC SURGERY

## 2021-11-09 PROCEDURE — S0260 H&P FOR SURGERY: HCPCS | Performed by: ORTHOPAEDIC SURGERY

## 2021-11-09 PROCEDURE — 25010000002 PROPOFOL 10 MG/ML EMULSION: Performed by: NURSE ANESTHETIST, CERTIFIED REGISTERED

## 2021-11-09 PROCEDURE — C1755 CATHETER, INTRASPINAL: HCPCS | Performed by: ORTHOPAEDIC SURGERY

## 2021-11-09 PROCEDURE — 63710000001 OXYCODONE 5 MG TABLET: Performed by: ORTHOPAEDIC SURGERY

## 2021-11-09 PROCEDURE — 63710000001 ALLOPURINOL 300 MG TABLET

## 2021-11-09 PROCEDURE — 63710000001 INSULIN LISPRO (HUMAN) PER 5 UNITS

## 2021-11-09 PROCEDURE — 25010000002 ONDANSETRON PER 1 MG: Performed by: NURSE ANESTHETIST, CERTIFIED REGISTERED

## 2021-11-09 PROCEDURE — C1889 IMPLANT/INSERT DEVICE, NOC: HCPCS | Performed by: ORTHOPAEDIC SURGERY

## 2021-11-09 PROCEDURE — 97161 PT EVAL LOW COMPLEX 20 MIN: CPT

## 2021-11-09 PROCEDURE — 63710000001 MUPIROCIN 2 % OINTMENT 1 G TUBE: Performed by: ORTHOPAEDIC SURGERY

## 2021-11-09 PROCEDURE — 73560 X-RAY EXAM OF KNEE 1 OR 2: CPT

## 2021-11-09 PROCEDURE — 63710000001 MELOXICAM 15 MG TABLET: Performed by: ORTHOPAEDIC SURGERY

## 2021-11-09 PROCEDURE — 84132 ASSAY OF SERUM POTASSIUM: CPT | Performed by: ANESTHESIOLOGY

## 2021-11-09 PROCEDURE — 63710000001 CARBIDOPA-LEVODOPA 25-100 MG TABLET

## 2021-11-09 DEVICE — IMPLANTABLE DEVICE: Type: IMPLANTABLE DEVICE | Site: KNEE | Status: FUNCTIONAL

## 2021-11-09 DEVICE — GENESIS II RESURFACING PATELLAR                                    PROSTHESIS  32MM
Type: IMPLANTABLE DEVICE | Site: KNEE | Status: FUNCTIONAL
Brand: GENESIS II

## 2021-11-09 DEVICE — PALACOS® R IS A FAST-CURING, RADIOPAQUE, POLY(METHYL METHACRYLATE)-BASED BONE CEMENT.PALACOS ® R CONTAINS THE X-RAY CONTRAST MEDIUM ZIRCONIUM DIOXIDE. TO IMPROVE VISIBILITY IN THE SURGICAL FIELD PALACOS ® R HAS BEEN COLOURED WITH CHLOROPHYLL (E141). THE BONE CEMENT IS PREPARED DIRECTLY BEFORE USE BY MIXING A POLYMER POWDER COMPONENT WITH A LIQUID MONOMER COMPONENT. A DUCTILE DOUGH FORMS WHICH CURES WITHIN A FEW MINUTES.
Type: IMPLANTABLE DEVICE | Site: KNEE | Status: FUNCTIONAL
Brand: PALACOS®

## 2021-11-09 DEVICE — LEGION CRUCIATE RETAINING OXINIUM                                    FEMORAL SIZE 5 LEFT
Type: IMPLANTABLE DEVICE | Site: KNEE | Status: FUNCTIONAL
Brand: LEGION

## 2021-11-09 DEVICE — KNOTLESS TISSUE CONTROL DEVICE, UNDYED UNIDIRECTIONAL (ANTIBACTERIAL) SYNTHETIC ABSORBABLE DEVICE
Type: IMPLANTABLE DEVICE | Site: KNEE | Status: FUNCTIONAL
Brand: STRATAFIX

## 2021-11-09 DEVICE — VIOLET ANTIBACTERIAL POLYDIOXANONE, KNOTLESS TISSUE CONTROL DEVICE
Type: IMPLANTABLE DEVICE | Site: KNEE | Status: FUNCTIONAL
Brand: STRATAFIX

## 2021-11-09 DEVICE — GENESIS II NON-POROUS TIBIAL                                    BASEPLATE SIZE 4 LEFT
Type: IMPLANTABLE DEVICE | Site: KNEE | Status: FUNCTIONAL
Brand: GENESIS II

## 2021-11-09 RX ORDER — IPRATROPIUM BROMIDE AND ALBUTEROL SULFATE 2.5; .5 MG/3ML; MG/3ML
3 SOLUTION RESPIRATORY (INHALATION) ONCE AS NEEDED
Status: DISCONTINUED | OUTPATIENT
Start: 2021-11-09 | End: 2021-11-09 | Stop reason: HOSPADM

## 2021-11-09 RX ORDER — ONDANSETRON 2 MG/ML
4 INJECTION INTRAMUSCULAR; INTRAVENOUS EVERY 6 HOURS PRN
Status: DISCONTINUED | OUTPATIENT
Start: 2021-11-09 | End: 2021-11-10 | Stop reason: HOSPADM

## 2021-11-09 RX ORDER — BUPIVACAINE HYDROCHLORIDE 2.5 MG/ML
INJECTION, SOLUTION EPIDURAL; INFILTRATION; INTRACAUDAL
Status: COMPLETED | OUTPATIENT
Start: 2021-11-09 | End: 2021-11-09

## 2021-11-09 RX ORDER — PROMETHAZINE HYDROCHLORIDE 25 MG/1
25 SUPPOSITORY RECTAL ONCE AS NEEDED
Status: DISCONTINUED | OUTPATIENT
Start: 2021-11-09 | End: 2021-11-09 | Stop reason: HOSPADM

## 2021-11-09 RX ORDER — SODIUM CHLORIDE 0.9 % (FLUSH) 0.9 %
3 SYRINGE (ML) INJECTION EVERY 12 HOURS SCHEDULED
Status: DISCONTINUED | OUTPATIENT
Start: 2021-11-09 | End: 2021-11-09 | Stop reason: HOSPADM

## 2021-11-09 RX ORDER — SODIUM CHLORIDE 9 MG/ML
120 INJECTION, SOLUTION INTRAVENOUS CONTINUOUS
Status: DISCONTINUED | OUTPATIENT
Start: 2021-11-09 | End: 2021-11-10 | Stop reason: HOSPADM

## 2021-11-09 RX ORDER — MEPERIDINE HYDROCHLORIDE 25 MG/ML
12.5 INJECTION INTRAMUSCULAR; INTRAVENOUS; SUBCUTANEOUS
Status: DISCONTINUED | OUTPATIENT
Start: 2021-11-09 | End: 2021-11-09 | Stop reason: HOSPADM

## 2021-11-09 RX ORDER — ONDANSETRON 4 MG/1
4 TABLET, FILM COATED ORAL EVERY 6 HOURS PRN
Status: DISCONTINUED | OUTPATIENT
Start: 2021-11-09 | End: 2021-11-10 | Stop reason: HOSPADM

## 2021-11-09 RX ORDER — HYDROCODONE BITARTRATE AND ACETAMINOPHEN 5; 325 MG/1; MG/1
1 TABLET ORAL ONCE AS NEEDED
Status: DISCONTINUED | OUTPATIENT
Start: 2021-11-09 | End: 2021-11-09 | Stop reason: HOSPADM

## 2021-11-09 RX ORDER — GABAPENTIN 300 MG/1
300 CAPSULE ORAL NIGHTLY
Status: DISCONTINUED | OUTPATIENT
Start: 2021-11-09 | End: 2021-11-10 | Stop reason: HOSPADM

## 2021-11-09 RX ORDER — ROPIVACAINE HYDROCHLORIDE 5 MG/ML
INJECTION, SOLUTION EPIDURAL; INFILTRATION; PERINEURAL AS NEEDED
Status: DISCONTINUED | OUTPATIENT
Start: 2021-11-09 | End: 2021-11-09 | Stop reason: HOSPADM

## 2021-11-09 RX ORDER — BUPIVACAINE HCL/0.9 % NACL/PF 0.125 %
PLASTIC BAG, INJECTION (ML) EPIDURAL AS NEEDED
Status: DISCONTINUED | OUTPATIENT
Start: 2021-11-09 | End: 2021-11-09 | Stop reason: SURG

## 2021-11-09 RX ORDER — DROPERIDOL 2.5 MG/ML
0.62 INJECTION, SOLUTION INTRAMUSCULAR; INTRAVENOUS AS NEEDED
Status: DISCONTINUED | OUTPATIENT
Start: 2021-11-09 | End: 2021-11-09 | Stop reason: HOSPADM

## 2021-11-09 RX ORDER — HYDRALAZINE HYDROCHLORIDE 20 MG/ML
5 INJECTION INTRAMUSCULAR; INTRAVENOUS
Status: DISCONTINUED | OUTPATIENT
Start: 2021-11-09 | End: 2021-11-09 | Stop reason: HOSPADM

## 2021-11-09 RX ORDER — LABETALOL HYDROCHLORIDE 5 MG/ML
5 INJECTION, SOLUTION INTRAVENOUS
Status: DISCONTINUED | OUTPATIENT
Start: 2021-11-09 | End: 2021-11-09 | Stop reason: HOSPADM

## 2021-11-09 RX ORDER — MAGNESIUM HYDROXIDE 1200 MG/15ML
LIQUID ORAL AS NEEDED
Status: DISCONTINUED | OUTPATIENT
Start: 2021-11-09 | End: 2021-11-09 | Stop reason: HOSPADM

## 2021-11-09 RX ORDER — SODIUM CHLORIDE 0.9 % (FLUSH) 0.9 %
3-10 SYRINGE (ML) INJECTION AS NEEDED
Status: DISCONTINUED | OUTPATIENT
Start: 2021-11-09 | End: 2021-11-09 | Stop reason: HOSPADM

## 2021-11-09 RX ORDER — ASPIRIN 325 MG
325 TABLET, DELAYED RELEASE (ENTERIC COATED) ORAL DAILY
Status: DISCONTINUED | OUTPATIENT
Start: 2021-11-10 | End: 2021-11-10 | Stop reason: HOSPADM

## 2021-11-09 RX ORDER — DEXTROSE MONOHYDRATE 25 G/50ML
25 INJECTION, SOLUTION INTRAVENOUS
Status: DISCONTINUED | OUTPATIENT
Start: 2021-11-09 | End: 2021-11-10 | Stop reason: HOSPADM

## 2021-11-09 RX ORDER — CEFAZOLIN SODIUM 2 G/100ML
2 INJECTION, SOLUTION INTRAVENOUS EVERY 8 HOURS
Status: COMPLETED | OUTPATIENT
Start: 2021-11-09 | End: 2021-11-09

## 2021-11-09 RX ORDER — MORPHINE SULFATE 2 MG/ML
2 INJECTION, SOLUTION INTRAMUSCULAR; INTRAVENOUS
Status: DISCONTINUED | OUTPATIENT
Start: 2021-11-09 | End: 2021-11-10 | Stop reason: HOSPADM

## 2021-11-09 RX ORDER — DROPERIDOL 2.5 MG/ML
0.62 INJECTION, SOLUTION INTRAMUSCULAR; INTRAVENOUS ONCE AS NEEDED
Status: DISCONTINUED | OUTPATIENT
Start: 2021-11-09 | End: 2021-11-09 | Stop reason: HOSPADM

## 2021-11-09 RX ORDER — FENTANYL CITRATE 50 UG/ML
50 INJECTION, SOLUTION INTRAMUSCULAR; INTRAVENOUS
Status: DISCONTINUED | OUTPATIENT
Start: 2021-11-09 | End: 2021-11-09 | Stop reason: HOSPADM

## 2021-11-09 RX ORDER — ONDANSETRON 2 MG/ML
4 INJECTION INTRAMUSCULAR; INTRAVENOUS EVERY 6 HOURS PRN
Status: DISCONTINUED | OUTPATIENT
Start: 2021-11-09 | End: 2021-11-09

## 2021-11-09 RX ORDER — SODIUM CHLORIDE 0.9 % (FLUSH) 0.9 %
1-10 SYRINGE (ML) INJECTION AS NEEDED
Status: DISCONTINUED | OUTPATIENT
Start: 2021-11-09 | End: 2021-11-10 | Stop reason: HOSPADM

## 2021-11-09 RX ORDER — NICOTINE POLACRILEX 4 MG
15 LOZENGE BUCCAL
Status: DISCONTINUED | OUTPATIENT
Start: 2021-11-09 | End: 2021-11-10 | Stop reason: HOSPADM

## 2021-11-09 RX ORDER — MELOXICAM 7.5 MG/1
15 TABLET ORAL DAILY
Status: DISCONTINUED | OUTPATIENT
Start: 2021-11-10 | End: 2021-11-10 | Stop reason: HOSPADM

## 2021-11-09 RX ORDER — PROMETHAZINE HYDROCHLORIDE 25 MG/1
25 TABLET ORAL ONCE AS NEEDED
Status: DISCONTINUED | OUTPATIENT
Start: 2021-11-09 | End: 2021-11-09 | Stop reason: HOSPADM

## 2021-11-09 RX ORDER — EPHEDRINE SULFATE 50 MG/ML
INJECTION, SOLUTION INTRAVENOUS AS NEEDED
Status: DISCONTINUED | OUTPATIENT
Start: 2021-11-09 | End: 2021-11-09 | Stop reason: SURG

## 2021-11-09 RX ORDER — SODIUM CHLORIDE 0.9 % (FLUSH) 0.9 %
10 SYRINGE (ML) INJECTION AS NEEDED
Status: DISCONTINUED | OUTPATIENT
Start: 2021-11-09 | End: 2021-11-09 | Stop reason: HOSPADM

## 2021-11-09 RX ORDER — ACETAMINOPHEN 500 MG
1000 TABLET ORAL ONCE
Status: COMPLETED | OUTPATIENT
Start: 2021-11-09 | End: 2021-11-09

## 2021-11-09 RX ORDER — SODIUM CHLORIDE 0.9 % (FLUSH) 0.9 %
10 SYRINGE (ML) INJECTION EVERY 12 HOURS SCHEDULED
Status: DISCONTINUED | OUTPATIENT
Start: 2021-11-09 | End: 2021-11-10 | Stop reason: HOSPADM

## 2021-11-09 RX ORDER — NALOXONE HCL 0.4 MG/ML
0.4 VIAL (ML) INJECTION AS NEEDED
Status: DISCONTINUED | OUTPATIENT
Start: 2021-11-09 | End: 2021-11-09 | Stop reason: HOSPADM

## 2021-11-09 RX ORDER — ALLOPURINOL 300 MG/1
300 TABLET ORAL DAILY
Status: DISCONTINUED | OUTPATIENT
Start: 2021-11-09 | End: 2021-11-10 | Stop reason: HOSPADM

## 2021-11-09 RX ORDER — LISINOPRIL 20 MG/1
20 TABLET ORAL
Status: DISCONTINUED | OUTPATIENT
Start: 2021-11-09 | End: 2021-11-10 | Stop reason: HOSPADM

## 2021-11-09 RX ORDER — LIDOCAINE HYDROCHLORIDE 10 MG/ML
0.5 INJECTION, SOLUTION EPIDURAL; INFILTRATION; INTRACAUDAL; PERINEURAL ONCE AS NEEDED
Status: COMPLETED | OUTPATIENT
Start: 2021-11-09 | End: 2021-11-09

## 2021-11-09 RX ORDER — SODIUM CHLORIDE 0.9 % (FLUSH) 0.9 %
10 SYRINGE (ML) INJECTION EVERY 12 HOURS SCHEDULED
Status: DISCONTINUED | OUTPATIENT
Start: 2021-11-09 | End: 2021-11-09 | Stop reason: HOSPADM

## 2021-11-09 RX ORDER — HYDROMORPHONE HYDROCHLORIDE 1 MG/ML
0.5 INJECTION, SOLUTION INTRAMUSCULAR; INTRAVENOUS; SUBCUTANEOUS
Status: DISCONTINUED | OUTPATIENT
Start: 2021-11-09 | End: 2021-11-09 | Stop reason: HOSPADM

## 2021-11-09 RX ORDER — LABETALOL HYDROCHLORIDE 5 MG/ML
10 INJECTION, SOLUTION INTRAVENOUS EVERY 4 HOURS PRN
Status: DISCONTINUED | OUTPATIENT
Start: 2021-11-09 | End: 2021-11-10 | Stop reason: HOSPADM

## 2021-11-09 RX ORDER — OXYCODONE HYDROCHLORIDE 5 MG/1
5 TABLET ORAL EVERY 4 HOURS PRN
Status: DISCONTINUED | OUTPATIENT
Start: 2021-11-09 | End: 2021-11-10 | Stop reason: HOSPADM

## 2021-11-09 RX ORDER — SODIUM CHLORIDE 9 MG/ML
9 INJECTION, SOLUTION INTRAVENOUS CONTINUOUS PRN
Status: DISCONTINUED | OUTPATIENT
Start: 2021-11-09 | End: 2021-11-09 | Stop reason: HOSPADM

## 2021-11-09 RX ORDER — PREGABALIN 150 MG/1
150 CAPSULE ORAL ONCE
Status: COMPLETED | OUTPATIENT
Start: 2021-11-09 | End: 2021-11-09

## 2021-11-09 RX ORDER — NALOXONE HCL 0.4 MG/ML
0.4 VIAL (ML) INJECTION
Status: DISCONTINUED | OUTPATIENT
Start: 2021-11-09 | End: 2021-11-10 | Stop reason: HOSPADM

## 2021-11-09 RX ORDER — DEXAMETHASONE SODIUM PHOSPHATE 4 MG/ML
INJECTION, SOLUTION INTRA-ARTICULAR; INTRALESIONAL; INTRAMUSCULAR; INTRAVENOUS; SOFT TISSUE AS NEEDED
Status: DISCONTINUED | OUTPATIENT
Start: 2021-11-09 | End: 2021-11-09 | Stop reason: SURG

## 2021-11-09 RX ORDER — CEFAZOLIN SODIUM 2 G/100ML
2 INJECTION, SOLUTION INTRAVENOUS ONCE
Status: COMPLETED | OUTPATIENT
Start: 2021-11-09 | End: 2021-11-09

## 2021-11-09 RX ORDER — ONDANSETRON 2 MG/ML
4 INJECTION INTRAMUSCULAR; INTRAVENOUS ONCE AS NEEDED
Status: DISCONTINUED | OUTPATIENT
Start: 2021-11-09 | End: 2021-11-09 | Stop reason: HOSPADM

## 2021-11-09 RX ORDER — BUPIVACAINE HYDROCHLORIDE 5 MG/ML
INJECTION, SOLUTION PERINEURAL
Status: COMPLETED | OUTPATIENT
Start: 2021-11-09 | End: 2021-11-09

## 2021-11-09 RX ORDER — ONDANSETRON 2 MG/ML
INJECTION INTRAMUSCULAR; INTRAVENOUS AS NEEDED
Status: DISCONTINUED | OUTPATIENT
Start: 2021-11-09 | End: 2021-11-09 | Stop reason: SURG

## 2021-11-09 RX ORDER — MIDAZOLAM HYDROCHLORIDE 1 MG/ML
0.5 INJECTION INTRAMUSCULAR; INTRAVENOUS
Status: DISCONTINUED | OUTPATIENT
Start: 2021-11-09 | End: 2021-11-09 | Stop reason: HOSPADM

## 2021-11-09 RX ORDER — MELOXICAM 15 MG/1
15 TABLET ORAL ONCE
Status: COMPLETED | OUTPATIENT
Start: 2021-11-09 | End: 2021-11-09

## 2021-11-09 RX ORDER — FAMOTIDINE 20 MG/1
20 TABLET, FILM COATED ORAL ONCE
Status: COMPLETED | OUTPATIENT
Start: 2021-11-09 | End: 2021-11-09

## 2021-11-09 RX ADMIN — CEFAZOLIN SODIUM 2 G: 2 INJECTION, SOLUTION INTRAVENOUS at 23:00

## 2021-11-09 RX ADMIN — PREGABALIN 150 MG: 150 CAPSULE ORAL at 06:44

## 2021-11-09 RX ADMIN — DEXAMETHASONE SODIUM PHOSPHATE 8 MG: 4 INJECTION, SOLUTION INTRA-ARTICULAR; INTRALESIONAL; INTRAMUSCULAR; INTRAVENOUS; SOFT TISSUE at 07:41

## 2021-11-09 RX ADMIN — Medication 100 MCG: at 08:04

## 2021-11-09 RX ADMIN — PROPOFOL 100 MCG/KG/MIN: 10 INJECTION, EMULSION INTRAVENOUS at 07:28

## 2021-11-09 RX ADMIN — ROPIVACAINE HYDROCHLORIDE 10 ML/HR: 2 INJECTION, SOLUTION EPIDURAL; INFILTRATION at 09:41

## 2021-11-09 RX ADMIN — BUPIVACAINE HYDROCHLORIDE 30 ML: 2.5 INJECTION, SOLUTION EPIDURAL; INFILTRATION; INTRACAUDAL; PERINEURAL at 09:22

## 2021-11-09 RX ADMIN — EPHEDRINE SULFATE 5 MG: 50 INJECTION INTRAVENOUS at 07:40

## 2021-11-09 RX ADMIN — SODIUM CHLORIDE, PRESERVATIVE FREE 10 ML: 5 INJECTION INTRAVENOUS at 20:23

## 2021-11-09 RX ADMIN — LIDOCAINE HYDROCHLORIDE 0.2 ML: 10 INJECTION, SOLUTION EPIDURAL; INFILTRATION; INTRACAUDAL; PERINEURAL at 06:15

## 2021-11-09 RX ADMIN — SODIUM CHLORIDE 9 ML/HR: 9 INJECTION, SOLUTION INTRAVENOUS at 06:15

## 2021-11-09 RX ADMIN — INSULIN LISPRO 2 UNITS: 100 INJECTION, SOLUTION INTRAVENOUS; SUBCUTANEOUS at 17:59

## 2021-11-09 RX ADMIN — ACETAMINOPHEN 1000 MG: 500 TABLET ORAL at 06:44

## 2021-11-09 RX ADMIN — INSULIN DETEMIR 10 UNITS: 100 INJECTION, SOLUTION SUBCUTANEOUS at 20:31

## 2021-11-09 RX ADMIN — MELOXICAM 15 MG: 15 TABLET ORAL at 06:44

## 2021-11-09 RX ADMIN — EPHEDRINE SULFATE 5 MG: 50 INJECTION INTRAVENOUS at 08:04

## 2021-11-09 RX ADMIN — SODIUM CHLORIDE 120 ML/HR: 9 INJECTION, SOLUTION INTRAVENOUS at 14:47

## 2021-11-09 RX ADMIN — CEFAZOLIN SODIUM 2 G: 2 INJECTION, SOLUTION INTRAVENOUS at 17:00

## 2021-11-09 RX ADMIN — GABAPENTIN 300 MG: 300 CAPSULE ORAL at 20:21

## 2021-11-09 RX ADMIN — EPHEDRINE SULFATE 7.5 MG: 50 INJECTION INTRAVENOUS at 07:41

## 2021-11-09 RX ADMIN — METOPROLOL TARTRATE 25 MG: 25 TABLET, FILM COATED ORAL at 14:46

## 2021-11-09 RX ADMIN — ONDANSETRON 4 MG: 2 INJECTION INTRAMUSCULAR; INTRAVENOUS at 07:41

## 2021-11-09 RX ADMIN — OXYCODONE 5 MG: 5 TABLET ORAL at 16:16

## 2021-11-09 RX ADMIN — CARBIDOPA AND LEVODOPA 1 TABLET: 25; 100 TABLET ORAL at 20:21

## 2021-11-09 RX ADMIN — FAMOTIDINE 20 MG: 20 TABLET ORAL at 06:44

## 2021-11-09 RX ADMIN — ALLOPURINOL 300 MG: 300 TABLET ORAL at 14:46

## 2021-11-09 RX ADMIN — BUPIVACAINE HYDROCHLORIDE 1.8 ML: 5 INJECTION, SOLUTION PERINEURAL at 07:32

## 2021-11-09 RX ADMIN — OXYCODONE 5 MG: 5 TABLET ORAL at 22:59

## 2021-11-09 RX ADMIN — CARBIDOPA AND LEVODOPA 1 TABLET: 25; 100 TABLET ORAL at 14:46

## 2021-11-09 RX ADMIN — LISINOPRIL 20 MG: 20 TABLET ORAL at 14:46

## 2021-11-09 RX ADMIN — CEFAZOLIN SODIUM 2 G: 2 INJECTION, SOLUTION INTRAVENOUS at 07:25

## 2021-11-09 RX ADMIN — MUPIROCIN 1 APPLICATION: 20 OINTMENT TOPICAL at 06:45

## 2021-11-09 RX ADMIN — Medication 100 MCG: at 07:41

## 2021-11-09 NOTE — ANESTHESIA POSTPROCEDURE EVALUATION
Patient: Raleigh Paredes    Procedure Summary     Date: 11/09/21 Room / Location:  HIRAL OR  /  HIRAL OR    Anesthesia Start: 0725 Anesthesia Stop: 0919    Procedure: TOTAL KNEE ARTHROPLASTY LEFT (Left Knee) Diagnosis:       Primary osteoarthritis of left knee      (Primary osteoarthritis of left knee [M17.12])    Surgeons: Ned Pizarro MD Provider: Christine Greenberg MD    Anesthesia Type: spinal ASA Status: 2          Anesthesia Type: spinal    Vitals  Vitals Value Taken Time   /65 11/09/21 1300   Temp 97.4 °F (36.3 °C) 11/09/21 1015   Pulse 101 11/09/21 1315   Resp 1 11/09/21 1030   SpO2 95 % 11/09/21 1315   Vitals shown include unvalidated device data.        Post Anesthesia Care and Evaluation    Patient location during evaluation: PACU  Patient participation: complete - patient participated  Level of consciousness: awake and alert  Pain management: adequate  Airway patency: patent  Anesthetic complications: No anesthetic complications  PONV Status: none  Cardiovascular status: hemodynamically stable and acceptable  Respiratory status: nonlabored ventilation, acceptable and nasal cannula  Hydration status: acceptable  Post Neuraxial Block status: No signs or symptoms of PDPH

## 2021-11-09 NOTE — H&P
Pre-Op H&P  Raleigh Paredes  [id number]  1934    Chief complaint: left knee pain     HPI:    Patient is a 87 y.o.male who presents with a history of pain in the left knee joint due to osteoarthritis. He rates his pain to be 7-8 out of 10 and is worsening over time. He has some accompanying swelling with an increase in pain with walking, standing, sitting and climbing stairs. He is s/p right total knee replacement in 2018. He has failed to adequately respond to conservative treatment options and presents to the operating room today for surgical management with a left total knee replacement.     Review of Systems:  General ROS: negative for chills, fever or skin lesions;  No changes since last office visit.  Neg for recent sick exposure  Cardiovascular ROS: no chest pain or dyspnea on exertion  Respiratory ROS: no cough, shortness of breath, or wheezing    Allergies:   Allergies   Allergen Reactions   • Penicillins Itching and Swelling     Hand edema       Home Meds:    No current facility-administered medications on file prior to encounter.     Current Outpatient Medications on File Prior to Encounter   Medication Sig Dispense Refill   • allopurinol (ZYLOPRIM) 300 MG tablet Take 300 mg by mouth Daily.     • Chlorhexidine Gluconate (Antiseptic Skin Cleanser) 4 % solution Shower with solution as directed for 5 days prior to surgery 237 mL 0   • gabapentin (NEURONTIN) 300 MG capsule Take 300 mg by mouth Every Night.     • glipiZIDE (GLUCOTROL) 5 MG tablet Take 5 mg by mouth Daily.     • lisinopril-hydrochlorothiazide (PRINZIDE,ZESTORETIC) 20-12.5 MG per tablet Take 1 tablet by mouth Daily.     • metoprolol tartrate (LOPRESSOR) 25 MG tablet Take 25 mg by mouth Daily.     • mupirocin (BACTROBAN) 2 % ointment Place into the nostril(s) as directed by provider 2 (Two) Times a Day. 22 g 0       PMH:   Past Medical History:   Diagnosis Date   • Arthritis     knees and hands    • Borderline diabetes    • Cataract     bilat  "- may going to have surgery    • Colon polyps 2017   • Diverticulitis     h/o   • Diverticulosis    • Gout 2017   • History of COVID-19 2021    Asymptomatic   • History of kidney stones     x2   • Hypertension    • Hypertensive cardiovascular disease 2017   • Moderate obesity 2017   • Palpitations 2017   • Psoriasis 2017   • Skin cancer     basal from left arm    • Tinnitus     left    • Wears glasses    • Wears partial dentures     lower      PSH:    Past Surgical History:   Procedure Laterality Date   • CARDIAC CATHETERIZATION     • CATARACT EXTRACTION EXTRACAPSULAR W/ INTRAOCULAR LENS IMPLANTATION Bilateral    • COLONOSCOPY     • CYST REMOVAL      Cyst removed from spine, .   • INGUINAL HERNIA REPAIR Bilateral    • KNEE CARTILAGE SURGERY Right     Right knee removal of cartilage, , Dr. Dick.     • SKIN CANCER EXCISION      basal removed from left forear    • TONSILLECTOMY     • TOTAL KNEE ARTHROPLASTY Right 2018    Procedure: TOTAL KNEE ARTHROPLASTY RIGHT;  Surgeon: Ned Pizarro MD;  Location: [facility];  Service: Orthopedics   • WISDOM TOOTH EXTRACTION      all removed        Immunization History:  Influenza:   Pneumococcal: patient unsure of date   Tetanus: <10 years ago     Social History:   Tobacco:   Social History     Tobacco Use   Smoking Status Former Smoker   • Packs/day: 1.00   • Years: 15.00   • Pack years: 15.00   • Types: Cigarettes   • Quit date: 1965   • Years since quittin.8   Smokeless Tobacco Never Used      Alcohol:     Social History     Substance and Sexual Activity   Alcohol Use No       Vitals:           /72 (BP Location: Right arm, Patient Position: Lying)   Pulse 65   Temp 97.1 °F (36.2 °C) (Temporal)   Resp 18   Ht 157.5 cm (62\")   Wt 87.1 kg (192 lb)   SpO2 98%   BMI 35.12 kg/m²     Physical Exam:  General Appearance:    Alert, cooperative, no distress, appears stated age   Head:    " Normocephalic, without obvious abnormality, atraumatic   Lungs:     Clear to auscultation bilaterally, respirations unlabored    Heart:   Regular rate and rhythm, S1 and S2 normal, no murmur, rub    or gallop    Abdomen:    Soft, nontender.  +bowel sounds   Breast Exam:    deferred   Genitalia:    deferred   Extremities:   Extremities normal, atraumatic, no cyanosis or edema   Skin:   Skin color, texture, turgor normal, no rashes or lesions   Neurologic:   Grossly intact   Results Review  LABS:  Lab Results   Component Value Date    WBC 7.22 10/28/2021    HGB 15.8 10/28/2021    HCT 46.0 10/28/2021    MCV 89.3 10/28/2021     10/28/2021    NEUTROABS 4.70 10/28/2021    GLUCOSE 203 (H) 10/28/2021    BUN 28 (H) 10/28/2021    CREATININE 1.37 (H) 10/28/2021    EGFRIFNONA 49 (L) 10/28/2021    EGFRIFAFRI 70 10/27/2020     10/28/2021    K 4.8 10/28/2021     10/28/2021    CO2 25.0 10/28/2021    CALCIUM 9.4 10/28/2021    ALBUMIN 4.10 10/27/2020    AST 20 10/27/2020    ALT 14 10/27/2020    BILITOT 0.2 10/27/2020    PTT 34.3 10/28/2021    INR 1.03 10/28/2021       RADIOLOGY:  No radiology results for the last 3 days     Cancer Staging (if applicable)  Cancer Patient: __ yes _x_no __unknown; If yes, clinical stage T:__ N:__M:__, stage group or __N/A    Impression: Primary osteoarthritis of left knee     Plan: Left total knee arthroplasty       Nam Jiménez PA-C   11/09/21   6:50 AM EST     Agree with above - plan for left TKA    Ned Pizarro MD  11/09/21  07:19 EST

## 2021-11-09 NOTE — ANESTHESIA PROCEDURE NOTES
Spinal Block    Pre-sedation assessment completed: 11/9/2021 7:27 AM    Patient reassessed immediately prior to procedure    Patient location during procedure: OR  Start Time: 11/9/2021 7:29 AM  Indication:at surgeon's request  Performed By  CRNA: Clovis Rubio CRNA  Preanesthetic Checklist  Completed: patient identified, IV checked, site marked, risks and benefits discussed, surgical consent, monitors and equipment checked, pre-op evaluation and timeout performed  Spinal Block Prep:  Patient Position:sitting  Sterile Tech:cap, gloves, sterile barriers and mask  Prep:Chloraprep  Patient Monitoring:blood pressure monitoring, continuous pulse oximetry and EKG  Spinal Block Procedure  Approach:midline  Guidance:landmark technique and palpation technique  Location:L4-L5  Needle Type:Sprotte (Introducer:   no)  Needle Gauge:25 G  Placement of Spinal needle event:cerebrospinal fluid aspirated  Paresthesia: no  Fluid Appearance:clear  Medications: bupivacaine (MARCAINE) 0.5 % injection, 1.8 mL  Med Administered at 11/9/2021 7:32 AM   Post Assessment  Patient Tolerance:patient tolerated the procedure well with no apparent complications  Complications no  Additional Notes  Procedure:  Pt assisted to sitting position, with legs in position of comfort over side of bed.  Pt. instructed in optimal spine presentation, the spine was prepped/ Draped and the skin at insertion site was anesthetized with 1% Lidocaine 2 ml.  The spinal needle was then advanced until CSF flow was obtained and LA was injected:

## 2021-11-09 NOTE — H&P
Patient Name: Raleigh Paredes  MRN: [id number]  : 1934  DOS: 2021    Attending: Ned Pizarro MD    Primary Care Provider: Raleigh Subramanian MD      Chief complaint:  Left Knee Pain.    Subjective   Patient is a pleasant 87 y.o. male presented for scheduled surgery by .     Per his note (The patient is a 87 y.o. male with debilitating left knee pain secondary to osteoarthritis that failed to improve in spite of conservative treatment .  Options have been discussed at length with the patient and the patient has had an extended course of conservative treatment without long-term benefit. The patient has reached the point where the patient desires total knee arthroplasty surgery and understands the risks, benefits, and alternatives. Consent was obtained. Please see my office notes for details with regard to preoperative counseling and operative rationale.)     He underwent left total knee arthroplasty under spinal anesthesia.     Surgery went well and he was admitted for further medical management. Adductor canal nerve block catheter was placed by acute pain service.    Seen in PACU, doing fairly well . No nausea, vomiting, or shortness of breath.    No history of DVT or PE.       Allergies   Allergen Reactions   • Penicillins Itching and Swelling     Hand edema        Medications Prior to Admission   Medication Sig Dispense Refill Last Dose   • allopurinol (ZYLOPRIM) 300 MG tablet Take 300 mg by mouth Daily.   2021 at 0800   • carbidopa-levodopa (SINEMET)  MG per tablet Take 1 tablet by mouth 4 (Four) Times a Day. 360 tablet 1 2021 at 2100   • gabapentin (NEURONTIN) 300 MG capsule Take 300 mg by mouth Every Night.   2021 at 2100   • glipiZIDE (GLUCOTROL) 5 MG tablet Take 5 mg by mouth Daily.   2021 at 0800   • lisinopril-hydrochlorothiazide (PRINZIDE,ZESTORETIC) 20-12.5 MG per tablet Take 1 tablet by mouth Daily.   2021 at 0800   • metoprolol tartrate (LOPRESSOR) 25  MG tablet Take 25 mg by mouth Daily.   2021 at 0800   • triamcinolone (KENALOG) 0.1 % cream    3 weeks ago        Past Medical History:   Diagnosis Date   • Arthritis     knees and hands    • Borderline diabetes    • Cataract     bilat - may going to have surgery    • Colon polyps 2017   • Diverticulitis     h/o   • Diverticulosis    • Gout 2017   • History of COVID-19 2021    Asymptomatic   • History of kidney stones     x2   • Hypertension    • Hypertensive cardiovascular disease 2017   • Moderate obesity 2017   • Palpitations 2017   • Psoriasis 2017   • Skin cancer     basal from left arm    • Tinnitus     left    • Wears glasses    • Wears partial dentures     lower      Past Surgical History:   Procedure Laterality Date   • CARDIAC CATHETERIZATION     • CATARACT EXTRACTION EXTRACAPSULAR W/ INTRAOCULAR LENS IMPLANTATION Bilateral    • COLONOSCOPY     • CYST REMOVAL      Cyst removed from spine, .   • INGUINAL HERNIA REPAIR Bilateral    • KNEE CARTILAGE SURGERY Right     Right knee removal of cartilage, , Dr. Dick.     • SKIN CANCER EXCISION      basal removed from left forear    • TONSILLECTOMY     • TOTAL KNEE ARTHROPLASTY Right 2018    Procedure: TOTAL KNEE ARTHROPLASTY RIGHT;  Surgeon: Ned Pizarro MD;  Location: [facility];  Service: Orthopedics   • WISDOM TOOTH EXTRACTION      all removed      Family History   Problem Relation Age of Onset   • Colon cancer Other    • Heart failure Mother    • Colon cancer Father    • Heart failure Father    • Cancer Sister    • Stroke Sister    • Heart attack Brother      Social History     Tobacco Use   • Smoking status: Former Smoker     Packs/day: 1.00     Years: 15.00     Pack years: 15.00     Types: Cigarettes     Quit date: 1965     Years since quittin.8   • Smokeless tobacco: Never Used   Vaping Use   • Vaping Use: Never used   Substance Use Topics   • Alcohol use: No   • Drug use: No  "  . Retired from [employer]. Enjoys his gardening    Review of Systems  Pertinent items are noted in HPI, all other systems reviewed and negative    Vital Signs  /85 (BP Location: Right arm, Patient Position: Lying)   Pulse 104   Temp 97.3 °F (36.3 °C) (Oral)   Resp 16   Ht 157.5 cm (62\")   Wt 87.1 kg (192 lb)   SpO2 92%   BMI 35.12 kg/m²     Physical Exam:    General Appearance:    Alert, cooperative, in no acute distress   Head:    Normocephalic, without obvious abnormality, atraumatic   Eyes:            Lids and lashes normal, conjunctivae and sclerae normal, no   icterus, no pallor, corneas clear    Ears:    Ears appear intact with no abnormalities noted   Throat:   No oral lesions, no thrush, oral mucosa moist   Neck:   No adenopathy, supple, trachea midline, no thyromegaly         Lungs:     Clear to auscultation,respirations regular, even and                   unlabored    Heart:    Regular rhythm and normal rate, normal S1 and S2, no       murmur, no gallop   Abdomen:     Normal bowel sounds, no masses, no organomegaly, soft        non-tender, non-distended, no guarding, no rebound                 tenderness   Genitalia:    Deferred   Extremities:  Left LE, CDI dressing on knee, PNB cath present.    Pulses:   Pulses palpable and equal bilaterally   Skin:   No bleeding, bruising or rash   Neurologic:   Cranial nerves 2 - 12 grossly intact, intact flexion dorsiflexion bilateral feet.      I reviewed the patient's new clinical results.             Invalid input(s): NEUTOPHILPCT,  EOSPCT  Results from last 7 days   Lab Units 11/09/21  0628   POTASSIUM mmol/L 4.2     Lab Results   Component Value Date    HGBA1C 6.00 (H) 10/28/2021     Results for BLAISE SHELTON GAUTAM (MRN [id number]) as of 11/9/2021 14:22   Ref. Range 10/28/2021 09:32   Glucose Latest Ref Range: 65 - 99 mg/dL 203 (H)   Sodium Latest Ref Range: 136 - 145 mmol/L 140   Potassium Latest Ref Range: 3.5 - 5.2 mmol/L 4.8   CO2 Latest Ref Range: " 22.0 - 29.0 mmol/L 25.0   Chloride Latest Ref Range: 98 - 107 mmol/L 101   Anion Gap Latest Ref Range: 5.0 - 15.0 mmol/L 14.0   Creatinine Latest Ref Range: 0.76 - 1.27 mg/dL 1.37 (H)   BUN Latest Ref Range: 8 - 23 mg/dL 28 (H)   BUN/Creatinine Ratio Latest Ref Range: 7.0 - 25.0  20.4   Calcium Latest Ref Range: 8.6 - 10.5 mg/dL 9.4   eGFR Non  Am Latest Ref Range: >60 mL/min/1.73 49 (L)   Hemoglobin A1C Latest Ref Range: 4.80 - 5.60 % 6.00 (H)   C-Reactive Protein Latest Ref Range: 0.00 - 0.50 mg/dL 0.62 (H)   Protime Latest Ref Range: 11.4 - 14.4 Seconds 13.2   INR Latest Ref Range: 0.85 - 1.16  1.03   PTT Latest Ref Range: 22.0 - 39.0 seconds 34.3   WBC Latest Ref Range: 3.40 - 10.80 10*3/mm3 7.22   RBC Latest Ref Range: 4.14 - 5.80 10*6/mm3 5.15   Hemoglobin Latest Ref Range: 13.0 - 17.7 g/dL 15.8   Hematocrit Latest Ref Range: 37.5 - 51.0 % 46.0   RDW Latest Ref Range: 12.3 - 15.4 % 13.1   MCV Latest Ref Range: 79.0 - 97.0 fL 89.3   MCH Latest Ref Range: 26.6 - 33.0 pg 30.7   MCHC Latest Ref Range: 31.5 - 35.7 g/dL 34.3   MPV Latest Ref Range: 6.0 - 12.0 fL 11.0   Platelets Latest Ref Range: 140 - 450 10*3/mm3 189   RDW-SD Latest Ref Range: 37.0 - 54.0 fl 42.8         Assessment and Plan:       Status post total left knee replacement    Gout    HTN (hypertension)    DM (diabetes mellitus) (CMS/HCC)- new dx    Primary osteoarthritis of left knee      Plan  1. PT/OT,  Weight bearing as tolerated left LE  2. Pain control-prns, ACB cath with ropivacaine infusion.  3. IS-encourage  4. DVT proph- Mechanicals and aspirin  5. Bowel regimen  6. Resume home medications as appropriate  7. Monitor post-op labs  8. DC planning for home    Gout  -Continue allopurinol    HTN  - Continue home lisinopril, metoprolol  -Hold HCTZ  - Monitor BP   - Holding parameters for BP meds  - Labetalol PRN for SBP>170    DM2 (diagnosed on elevated hemoglobin A1c level)  - hgb A1c on 10/28/2021 6.0  - Accu-Chek AC and HS with low dose  SSI  - Levemir 10 nightly HS, hold if FSBS <150    Discussed with patient postop management plan, he expressed understanding and agreement.    Dragon disclaimer:  Part of this encounter note is an electronic transcription/translation of spoken language to printed text. The electronic translation of spoken language may permit erroneous, or at times, nonsensical words or phrases to be inadvertently transcribed; Although I have reviewed the note for such errors, some may still exist.    Arjun Yepez MD  11/09/21  14:20 EST

## 2021-11-09 NOTE — ANESTHESIA PREPROCEDURE EVALUATION
Anesthesia Evaluation     Patient summary reviewed and Nursing notes reviewed   no history of anesthetic complications:  NPO Solid Status: > 8 hours  NPO Liquid Status: > 8 hours           Airway   Mallampati: II  TM distance: >3 FB  Neck ROM: full  Possible difficult intubation  Dental      Pulmonary     breath sounds clear to auscultation  (+) a smoker Former,   (-) shortness of breath  Cardiovascular   Exercise tolerance: good (4-7 METS)    ECG reviewed  Rhythm: regular  Rate: normal    (+) hypertension,   (-) valvular problems/murmurs, dysrhythmias, angina, GRISSOM      Neuro/Psych  (+) neuromuscular disease (parkinson's (early stage per pt)), tremors,     (-) seizures, CVA  GI/Hepatic/Renal/Endo    (+) obesity,   renal disease stones, diabetes mellitus type 2,   (-) GERD, hepatitis, liver disease, no thyroid disorder    Musculoskeletal     Abdominal    Substance History      OB/GYN          Other   arthritis,    history of cancer (skin)    ROS/Med Hx Other: spondylolithesis                  Anesthesia Plan    ASA 2     spinal   (ACB post op)  intravenous induction     Anesthetic plan, all risks, benefits, and alternatives have been provided, discussed and informed consent has been obtained with: patient.    Plan discussed with CRNA.

## 2021-11-09 NOTE — OP NOTE
DATE OF PROCEDURE: 11/09/21    PREOPERATIVE DIAGNOSIS: left knee arthritis      POSTOPERATIVE DIAGNOSIS:  left knee arthritis    PROCEDURES PERFORMED:   left total knee arthroplasty with [employer] components (#5 cruciate retaining femur, #4 tibia, 10 mm polyethylene, with 32 three peg patella).     SURGEON: Ned Pziarro MD    ASSISTANT: Noemí Avila PA-C  (Noemí Avila PA-C was present and necessary for positioning, draping, retraction, instrumentation and closure.)    SPECIMENS: None    IMPLANTS:   Implant Name Type Inv. Item Serial No.  Lot No. LRB No. Used Action   DEV CONTRL TISS STRATAFIX SPIRAL MNCRYL UD 3/0 PLS 60CM - [id number] Implant DEV CONTRL TISS STRATAFIX SPIRAL MNCRYL UD 3/0 PLS 60CM  [id number] Left 1 Implanted   DEV CONTRL TISS STRATAFIX SYMM PDS PLUS BHARAT CT-1 45CM - [id number] Implant DEV CONTRL TISS STRATAFIX SYMM PDS PLUS BHARAT CT-1 45CM  [employer]  [employer] [id number] Left 1 Implanted   CMT BONE PALACOS R HI/VISC 1X40 - [id number] Implant CMT BONE PALACOS R HI/VISC 1X40  [employer] [id number] Left 1 Implanted   CMT BONE PALACOS R HI/VISC 1X40 - [id number] Implant CMT BONE PALACOS R HI/VISC 1X40  [employer] [id number] Left 1 Implanted   BASE TIB/KN GEN2 NONPOR TI SZ4 LT - [id number] Implant BASE TIB/KN GEN2 NONPOR TI SZ4 LT  [employer] [id number] Left 1 Implanted   COMP FEM LEGION OXINIUM CR SZ5 LT - [id number] Implant COMP FEM LEGION OXINIUM CR SZ5 LT  [employer] [id number] Left 1 Implanted   PAT GEN2 RESRF 32MM - [id number] Implant PAT GEN2 RESRF 32MM  [employer] [id number] Left 1 Implanted   INSRT ART LEGION CR HF XLPE SZ3TO4 10MM - [id number] Implant INSRT ART LEGION CR HF XLPE SZ3TO4 10MM  [employer] [id number] Left 1 Implanted         ANESTHESIA:  Spinal    STAFF:  Circulator: Heike Ulrich RN; Jose Simental RN  Scrub Person: Barroso, Zenon E  Vendor Representative: Frank Steinberg  Assistant: Alisa Rosenberg; Prashant Jenkins  Assistant: Noemí Avila PA-C    TOURNIQUET TIME: 17 minutes    ESTIMATED BLOOD LOSS: 100ml     COMPLICATIONS: None    PREOPERATIVE ANTIBIOTICS: Ancef 2 g    INDICATIONS: The patient is a 87 y.o. male with debilitating left knee pain secondary to osteoarthritis that failed to improve in spite of conservative treatment .  Options have been discussed at length with the patient and the patient has had an extended course of conservative treatment without long-term benefit. The patient has reached the point where the patient desires total knee arthroplasty surgery and understands the risks, benefits, and alternatives. Consent was obtained. Please see my office notes for details with regard to preoperative counseling and operative rationale.     DESCRIPTION OF PROCEDURE: The patient was positively identified in the preoperative holding area and brought to the operating suite and placed in a supine position. After adequate spinal anesthetic had been achieved, the left lower extremity was prepped and draped in the usual sterile fashion.  After application of a tourniquet to the left upper thigh, which was used during the procedure for a total 17 minutes during the cementation process only. Landmarks of the knee were identified and timeout procedure was performed to confirm the operative site, as well as other parameters. Following the sterile prep and drape, a skin incision was made just off the medial aspect of midline for a medial parapatellar approach. Following a sharp skin incision, dissection was carried down to the level of the extensor mechanism and a medial parapatellar arthrotomy was made and the patella was tucked into the lateral gutter. Description of arthritis: Bone-on-bone contact in medial compartment, tricompartmental osteophytes, varus alignment, fragmentation of the medial tibial plateau on the far medial rim.  The knee was adequately exposed and a distal  femoral cut was made with an intramedullary guide. This was subsequently sized for a #5 implant and anterior, posterior chamfer cuts made. The menisci removed both medially and laterally.  The ACL was transected and the tibia was subluxed anteriorly. Proximal tibia cut was made with the external alignment guide. The cut was noted to be perpendicular to the tibial axis, with symmetric flexion and extension gaps. Therefore, final tibial preparations to accommodate a #4 tibia were made, followed by final femoral preparations. With a trial 10 insert in place, full flexion and extension was noted with no instability. The patella was then prepared for a 32 three peg patella which had excellent tracking. All trial components were removed and final components were cemented in place with namely a #4 tibia, #5 cruciate retaining femur and a 32 three peg patella with a trial 10 insert for cement compression. All the excess cement was removed from the bone implant interface and allowed to harden. Tourniquet was deflated. Hemostasis was obtained with electrocautery. There was no brisk bleeding noted in the popliteal fossa in particular. Therefore, the knee was copiously irrigated as it was between major steps, and the final 10 insert was placed as this was deemed appropriate for the patient's anatomy with full flexion and extension and no instability and attention was then directed towards closure. The medial parapatellar arthrotomy was closed with #1 Vicryl in an interrupted figure-of-eight fashion in 4 strategic locations followed by oversewing this from proximal to distal with a #1 StrataFix symmetric, which nicely sealed the joint, followed by closure of the deep fascial layer with #1 Vicryl in a buried interrupted fashion, followed by closure of the subcutaneous layer with 2-0 Vicryl and the skin with 3-0 Stratafix in a running subcuticular fashion.  Adhesive wound closure dressing was applied followed by a sterile dressing  with 4 x 4's, abdominal pad, soft roll and Ace wrap. The patient tolerated the procedure well and was brought to the recovery room in good condition.     PLAN:  1.  The patient will begin early range of motion and weight-bearing per the post total knee arthroplasty protocol.   2.  I anticipate brief hospitalization for initial rehabilitation and pain control followed by continued rehabilitation home health or outpatient physical therapy setting.  Patient may be ready for discharge later today if he is cleared medically and by physical therapy, with good pain control.  Follow-up with me in 3 weeks as planned.   3.  Postoperative medical management with Dr. Yepez.  4.  Aspirin will be utilized for DVT prophylaxis.       Ned Pizarro MD  11/09/21  09:01 EST

## 2021-11-09 NOTE — PLAN OF CARE
Problem: Adult Inpatient Plan of Care  Goal: Plan of Care Review  Recent Flowsheet Documentation  Taken 11/9/2021 1335 by Diana Houston, PT  Progress: improving  Plan of Care Reviewed With:   patient   daughter  Outcome Summary: Patient ambulated 200 feet with RW, CGAx2, step through gait pattern, limited by fatigue. ROM to be initiated POD#1. Mild residual numbness in L LE from spinal but independent with L SLR and no knee buckling with weight bearing. Encouraged patient to ambulate with nursing again later this PM. Recommend d/c home with family and HHPT.   Goal Outcome Evaluation:  Plan of Care Reviewed With: patient, daughter        Progress: improving  Outcome Summary: Patient ambulated 200 feet with RW, CGAx2, step through gait pattern, limited by fatigue. ROM to be initiated POD#1. Mild residual numbness in L LE from spinal but independent with L SLR and no knee buckling with weight bearing. Encouraged patient to ambulate with nursing again later this PM. Recommend d/c home with family and HHPT.

## 2021-11-09 NOTE — THERAPY EVALUATION
Patient Name: Raleigh Paredes  : 1934    MRN: [id number]                              Today's Date: 2021       Admit Date: 2021    Visit Dx:     ICD-10-CM ICD-9-CM   1. Primary osteoarthritis of left knee  M17.12 715.16     Patient Active Problem List   Diagnosis   • Hypertensive cardiovascular disease   • Palpitations   • Gout   • Moderate obesity   • Psoriasis   • Colon polyps   • Primary osteoarthritis of right knee   • HTN (hypertension)   • Status post total right knee replacement   • DM (diabetes mellitus) (CMS/HCC)- new dx   • Leukocytosis, mild, likely reactive   • Acute postoperative pain   • Parkinson's disease (HCC)   • Primary osteoarthritis of left knee   • Status post total left knee replacement     Past Medical History:   Diagnosis Date   • Arthritis     knees and hands    • Borderline diabetes    • Cataract     bilat - may going to have surgery    • Colon polyps 2017   • Diverticulitis     h/o   • Diverticulosis    • Gout 2017   • History of COVID-19 2021    Asymptomatic   • History of kidney stones     x2   • Hypertension    • Hypertensive cardiovascular disease 2017   • Moderate obesity 2017   • Palpitations 2017   • Psoriasis 2017   • Skin cancer     basal from left arm    • Tinnitus     left    • Wears glasses    • Wears partial dentures     lower      Past Surgical History:   Procedure Laterality Date   • CARDIAC CATHETERIZATION     • CATARACT EXTRACTION EXTRACAPSULAR W/ INTRAOCULAR LENS IMPLANTATION Bilateral    • COLONOSCOPY     • CYST REMOVAL      Cyst removed from spine, .   • INGUINAL HERNIA REPAIR Bilateral    • KNEE CARTILAGE SURGERY Right     Right knee removal of cartilage, , Dr. Dick.     • SKIN CANCER EXCISION      basal removed from left forear    • TONSILLECTOMY     • TOTAL KNEE ARTHROPLASTY Right 2018    Procedure: TOTAL KNEE ARTHROPLASTY RIGHT;  Surgeon: Ned Pizarro MD;  Location: [facility] OR;   Service: Orthopedics   • WISDOM TOOTH EXTRACTION      all removed       General Information     Row Name 11/09/21 1335          Physical Therapy Time and Intention    Document Type evaluation  -LR     Mode of Treatment physical therapy; individual therapy  -LR     Row Name 11/09/21 1335          General Information    Patient Profile Reviewed yes  -LR     Prior Level of Function min assist:; all household mobility; community mobility; gait; transfer; bed mobility; ADL's; home management; cooking; cleaning; shopping; using stairs; independent:; driving  all mobility limited by pain, used SPC at all times  -LR     Existing Precautions/Restrictions fall; other (see comments)  L adductor canal nerve catheter  -LR     Barriers to Rehab previous functional deficit  -LR     Row Name 11/09/21 1335          Living Environment    Lives With spouse; child(santiago), adult  family can assist at all times upon d/c home  -LR     Row Name 11/09/21 1335          Home Main Entrance    Number of Stairs, Main Entrance one  -LR     Stair Railings, Main Entrance none  -LR     Row Name 11/09/21 1335          Cognition    Orientation Status (Cognition) oriented x 4  -LR     Row Name 11/09/21 1335          Safety Issues, Functional Mobility    Safety Issues Affecting Function (Mobility) awareness of need for assistance; insight into deficits/self-awareness; safety precautions follow-through/compliance; judgment; positioning of assistive device; sequencing abilities; safety precaution awareness  -LR     Impairments Affecting Function (Mobility) strength; pain; endurance/activity tolerance; range of motion (ROM); balance  -LR           User Key  (r) = Recorded By, (t) = Taken By, (c) = Cosigned By    Initials Name Provider Type    LR Diana Houston, PT Physical Therapist               Mobility     Row Name 11/09/21 1335          Bed Mobility    Bed Mobility supine-sit  -LR     Supine-Sit [place] (Bed Mobility) verbal cues; minimum  assist (75% patient effort)  -LR     Assistive Device (Bed Mobility) head of bed elevated; bed rails  -LR     Comment (Bed Mobility) Verbal cues to move LEs towards EOB and to push up from bed to raise trunk into sitting and to scoot hips out to get feet on the floor. Min assist required to pull trunk into sitting. Denied dizziness upon sitting up.  -LR     Row Name 11/09/21 4479          Transfers    Comment (Transfers) Verbal cues to push up from bed to stand and to reach back for chair to lower into sitting. Verbal cues to step L LE out before t/f for comfort. Cues to weight shift at EOB, no knee buckling observed. Assisted to and from bathroom, cues to drive RW over commode.  -LR     Row Name 11/09/21 1335          Bed-Chair Transfer    Bed-Chair [place] (Transfers) not tested  -LR     Row Name 11/09/21 1335          Sit-Stand Transfer    Sit-Stand [place] (Transfers) verbal cues; contact guard; 2 person assist  -LR     Assistive Device (Sit-Stand Transfers) walker, front-wheeled  -LR     Row Name 11/09/21 1339          Gait/Stairs (Locomotion)    [place] Level (Gait) verbal cues; contact guard; 2 person assist  -LR     Assistive Device (Gait) walker, front-wheeled  -LR     Distance in Feet (Gait) 200  -LR     Deviations/Abnormal Patterns (Gait) bilateral deviations; daniel decreased; gait speed decreased; stride length decreased; left sided deviations; antalgic  -LR     Bilateral Gait Deviations forward flexed posture; heel strike decreased  -LR     Left Sided Gait Deviations weight shift ability decreased  -LR     [place] Level (Stairs) not tested  -LR     Comment (Gait/Stairs) Patient ambulated with step through gait pattern at slow pace. Verbal cues for increased L LE weight bearing/stance phase, decreased UE weight bearing, increased step length, and increased L knee flexion during swing phase. Improved with cues for correction. Gait limited by fatigue.  -     Row Name 11/09/21 4721           Mobility    Extremity Weight-bearing Status left lower extremity  -LR     Left Lower Extremity (Weight-bearing Status) weight-bearing as tolerated (WBAT)  -LR           User Key  (r) = Recorded By, (t) = Taken By, (c) = Cosigned By    Initials Name Provider Type    Diana Price, PT Physical Therapist               Obj/Interventions     Row Name 11/09/21 1335          Range of Motion Comprehensive    General Range of Motion lower extremity range of motion deficits identified  -     Row Name 11/09/21 1335          Strength Comprehensive (MMT)    General Manual Muscle Testing (MMT) Assessment lower extremity strength deficits identified  -     Row Name 11/09/21 1335          Motor Skills    Therapeutic Exercise ankle; hip; knee; other (see comments)  cues for technique; able to actively DF bilaterally  -     Row Name 11/09/21 1335          Hip (Therapeutic Exercise)    Hip (Therapeutic Exercise) isometric exercises  -     Hip Isometrics (Therapeutic Exercise) bilateral; gluteal sets; supine; 10 repetitions  -     Row Name 11/09/21 1335          Knee (Therapeutic Exercise)    Knee (Therapeutic Exercise) isometric exercises  -     Knee Isometrics (Therapeutic Exercise) left; quad sets; supine; 10 repetitions  -     Row Name 11/09/21 1335          Ankle (Therapeutic Exercise)    Ankle (Therapeutic Exercise) AROM (active range of motion)  -LR     Ankle AROM (Therapeutic Exercise) bilateral; dorsiflexion; plantarflexion; supine; 10 repetitions  -     Row Name 11/09/21 1335          Balance    Balance Assessment sitting static balance; sitting dynamic balance; standing static balance; standing dynamic balance  -LR     Static Sitting Balance WFL; unsupported; sitting, edge of bed  -LR     Dynamic Sitting Balance WFL; unsupported; sitting, edge of bed  -LR     Static Standing Balance WFL; supported; standing  -LR     Dynamic Standing Balance mild impairment; supported; standing  -LR      Row Name 11/09/21 1335          Sensory Assessment (Somatosensory)    Sensory Assessment (Somatosensory) other (see comments)  reports mild residual numbness/tingling in L LE; light touch absent medial L knee, diminished L lower leg  -LR     Row Name 11/09/21 1335          General Lower Extremity Assessment (Range of Motion)    Lower Extremity: Range of Motion RLE ROM WFL; knee, left: LE ROM  -LR     Comment: Lower Extremity ROM L knee AROM impaired 25%, will formally measure POD#1  -LR     Row Name 11/09/21 1335          Lower Extremity (Manual Muscle Testing)    Lower Extremity: Manual Muscle Testing (MMT) right LE strength is WFL; left knee strength deficit  -LR     Comment, MMT: Lower Extremity L knee functionally 4-/5, independent with SLR, no knee buckling observed with gait.  -LR           User Key  (r) = Recorded By, (t) = Taken By, (c) = Cosigned By    Initials Name Provider Type    Diana Price, PT Physical Therapist               Goals/Plan     Row Name 11/09/21 1335          Bed Mobility Goal 1 (PT)    Activity/Assistive Device (Bed Mobility Goal 1, PT) sit to supine/supine to sit  -LR     Cloud Level/Cues Needed (Bed Mobility Goal 1, PT) modified independence  -LR     Time Frame (Bed Mobility Goal 1, PT) long term goal (LTG); 3 days  -LR     Progress/Outcomes (Bed Mobility Goal 1, PT) goal ongoing  -LR     Row Name 11/09/21 1335          Transfer Goal 1 (PT)    Activity/Assistive Device (Transfer Goal 1, PT) sit-to-stand/stand-to-sit; walker, rolling  -LR     Cloud Level/Cues Needed (Transfer Goal 1, PT) modified independence  -LR     Time Frame (Transfer Goal 1, PT) long term goal (LTG); 3 days  -LR     Progress/Outcome (Transfer Goal 1, PT) goal ongoing  -LR     Row Name 11/09/21 1335          Gait Training Goal 1 (PT)    Activity/Assistive Device (Gait Training Goal 1, PT) gait (walking locomotion); walker, rolling  -LR     Cloud Level (Gait Training Goal 1, PT)  modified independence  -LR     Distance (Gait Training Goal 1, PT) 500 feet  -LR     Time Frame (Gait Training Goal 1, PT) long term goal (LTG); 3 days  -LR     Progress/Outcome (Gait Training Goal 1, PT) goal ongoing  -LR     Row Name 11/09/21 1335          ROM Goal 1 (PT)    ROM Goal 1 (PT) 0-90 degrees AAROM L knee  -LR     Time Frame (ROM Goal 1, PT) long-term goal (LTG); 3 days  -LR     Progress/Outcome (ROM Goal 1, PT) goal ongoing  -LR     Row Name 11/09/21 1335          Stairs Goal 1 (PT)    Activity/Assistive Device (Stairs Goal 1, PT) ascending stairs; descending stairs; step-to-step; walker, rolling  -LR     [place] Level/Cues Needed (Stairs Goal 1, PT) contact guard assist  -LR     Number of Stairs (Stairs Goal 1, PT) 1  -LR     Time Frame (Stairs Goal 1, PT) long term goal (LTG); 3 days  -LR     Progress/Outcome (Stairs Goal 1, PT) goal ongoing  -LR           User Key  (r) = Recorded By, (t) = Taken By, (c) = Cosigned By    Initials Name Provider Type    LR Diana Houston, PT Physical Therapist               Clinical Impression     Row Name 11/09/21 0090          Pain    Additional Documentation Pain Scale: Numbers Pre/Post-Treatment (Group)  -LR     Row Name 11/09/21 7345          Pain Scale: Numbers Pre/Post-Treatment    Pretreatment Pain Rating 2/10  -LR     Posttreatment Pain Rating 2/10  -LR     Pain Location - Side Left  -LR     Pain Location - Orientation medial  -LR     Pain Location knee  -LR     Pain Intervention(s) Ambulation/increased activity; Repositioned  -LR     Row Name 11/09/21 2161          Plan of Care Review    Plan of Care Reviewed With patient; daughter  -LR     Progress improving  -LR     Outcome Summary Patient ambulated 200 feet with RW, CGAx2, step through gait pattern, limited by fatigue. ROM to be initiated POD#1. Mild residual numbness in L LE from spinal but independent with L SLR and no knee buckling with weight bearing. Encouraged patient to ambulate with  nursing again later this PM. Recommend d/c home with family and HHPT.  -LR     Row Name 11/09/21 1335          Therapy Assessment/Plan (PT)    Patient/Family Therapy Goals Statement (PT) go home, decrease pain  -LR     Rehab Potential (PT) good, to achieve stated therapy goals  -LR     Criteria for Skilled Interventions Met (PT) yes; meets criteria; skilled treatment is necessary  -LR     Row Name 11/09/21 1335          Positioning and Restraints    Pre-Treatment Position in bed  -LR     Post Treatment Position chair  -LR     In Chair notified nsg; reclined; sitting; call light within reach; encouraged to call for assist; exit alarm on; with family/caregiver; compression device; waffle cushion; legs elevated  -LR           User Key  (r) = Recorded By, (t) = Taken By, (c) = Cosigned By    Initials Name Provider Type    LR Diana Houston, PT Physical Therapist               Outcome Measures     Row Name 11/09/21 1335          How much help from another person do you currently need...    Turning from your back to your side while in flat bed without using bedrails? 3  -LR     Moving from lying on back to sitting on the side of a flat bed without bedrails? 3  -LR     Moving to and from a bed to a chair (including a wheelchair)? 3  -LR     Standing up from a chair using your arms (e.g., wheelchair, bedside chair)? 3  -LR     Climbing 3-5 steps with a railing? 3  -LR     To walk in hospital room? 3  -LR     AM-PAC 6 Clicks Score (PT) 18  -LR     Row Name 11/09/21 0717          PADD    Diagnosis 1  -LR     Gender 2  -LR     Age Group 0  -LR     Gait Distance 1  -LR     Assist Level 1  -LR     Home Support 3  -LR     PADD Score 8  -LR     Patient Preference home with home health  -LR     Prediction by PADD Score directly home (with home health or out-patient rehab)  -LR     Row Name 11/09/21 0448          Functional Assessment    Outcome Measure Options AM-PAC 6 Clicks Basic Mobility (PT); PADD  -LR           User  Key  (r) = Recorded By, (t) = Taken By, (c) = Cosigned By    Initials Name Provider Type    LR Diana Houston, PT Physical Therapist                             Physical Therapy Education                 Title: PT OT SLP Therapies (In Progress)     Topic: Physical Therapy (Done)     Point: Mobility training (Done)     Learning Progress Summary           Patient Acceptance, E,D, VU,NR by LR at 11/9/2021 1335    Comment: Educated on precautions, weight bearing status, correct supine to sit t/f technique, correct sit<->stand t/f technique, correct gait mechanics, safety with mobility, and progression of POC.                   Point: Home exercise program (Done)     Learning Progress Summary           Patient Acceptance, E,D, VU,NR by LR at 11/9/2021 1335    Comment: Educated on precautions, weight bearing status, correct supine to sit t/f technique, correct sit<->stand t/f technique, correct gait mechanics, safety with mobility, and progression of POC.                   Point: Body mechanics (Done)     Learning Progress Summary           Patient Acceptance, E,D, VU,NR by LR at 11/9/2021 1335    Comment: Educated on precautions, weight bearing status, correct supine to sit t/f technique, correct sit<->stand t/f technique, correct gait mechanics, safety with mobility, and progression of POC.                   Point: Precautions (Done)     Learning Progress Summary           Patient Acceptance, E,D, VU,NR by LR at 11/9/2021 1335    Comment: Educated on precautions, weight bearing status, correct supine to sit t/f technique, correct sit<->stand t/f technique, correct gait mechanics, safety with mobility, and progression of POC.                               User Key     Initials Effective Dates Name Provider Type Discipline     06/16/21 -  Diana Houston, PT Physical Therapist PT              PT Recommendation and Plan  Planned Therapy Interventions (PT): balance training, bed mobility training, gait  training, home exercise program, patient/family education, ROM (range of motion), stair training, strengthening, transfer training  Plan of Care Reviewed With: patient, daughter  Progress: improving  Outcome Summary: Patient ambulated 200 feet with RW, CGAx2, step through gait pattern, limited by fatigue. ROM to be initiated POD#1. Mild residual numbness in L LE from spinal but independent with L SLR and no knee buckling with weight bearing. Encouraged patient to ambulate with nursing again later this PM. Recommend d/c home with family and HHPT.     Time Calculation:    PT Charges     Row Name 11/09/21 1335             Time Calculation    Start Time 1335  -LR      PT Received On 11/09/21  -LR      PT Goal Re-Cert Due Date 11/19/21  -LR              Timed Charges    78531 - PT Therapeutic Exercise Minutes 2  -LR      36758 - Gait Training Minutes  8  -LR              Untimed Charges    PT Eval/Re-eval Minutes 40  -LR              Total Minutes    Timed Charges Total Minutes 10  -LR      Untimed Charges Total Minutes 40  -LR       Total Minutes 50  -LR            User Key  (r) = Recorded By, (t) = Taken By, (c) = Cosigned By    Initials Name Provider Type    LR Diana Houston, PT Physical Therapist              Therapy Charges for Today     Code Description Service Date Service Provider Modifiers Qty    25108393946 HC GAIT TRAINING EA 15 MIN 11/9/2021 Diana Houston, PT GP 1    42576706372 HC PT THER SUPP EA 15 MIN 11/9/2021 Diana Houston, PT GP 2    [id number] HC PT EVAL LOW COMPLEXITY 3 11/9/2021 Diana Houston, PT GP 1          PT G-Codes  Outcome Measure Options: AM-PAC 6 Clicks Basic Mobility (PT), PADD  AM-PAC 6 Clicks Score (PT): 18    Diana Houston, PT  11/9/2021

## 2021-11-09 NOTE — ANESTHESIA PROCEDURE NOTES
Adductor Canal Block    Pre-sedation assessment completed: 11/9/2021 9:19 AM    Patient reassessed immediately prior to procedure    Patient location during procedure: post-op  Start time: 11/9/2021 9:19 AM  Reason for block: at surgeon's request and post-op pain management  Performed by  CRNA: Nam Mathews, CRNA  Assisted by: Sandra Bashir RN  Preanesthetic Checklist  Completed: patient identified, IV checked, site marked, risks and benefits discussed, surgical consent, monitors and equipment checked, pre-op evaluation and timeout performed  Prep:  Pt Position: supine  Sterile barriers:cap, gloves, mask and sterile barriers  Prep: ChloraPrep  Patient monitoring: blood pressure monitoring, continuous pulse oximetry and EKG  Procedure  Performed under: spinal  Guidance:ultrasound guided  Images:still images obtained, printed/placed on chart    Block Type:adductor canal block  Injection Technique:catheter  Needle Type:Tuohy and echogenic  Needle Gauge:18 G  Resistance on Injection: none  Catheter Size:20 G (20g)  Cath Depth at skin: 10 cm    Medications Used: bupivacaine PF (MARCAINE) 0.25 % injection, 30 mL  Med administered at 11/9/2021 9:22 AM      Medications  Preservative Free Saline:10ml    Post Assessment  Injection Assessment: negative aspiration for heme, incremental injection and no paresthesia on injection  Patient Tolerance:comfortable throughout block  Complications:no  Additional Notes  Procedure:             The pt was placed in the Supine position.  The Insertion site was  prepped and Draped in sterile fashion.  The pt was anesthetized with  IV Sedation( see meds).  Skin and cutaneous tissue was infiltrated and anesthetized with 1% Lidocaine 3 mls via a 25g needle.  A BBraun 4 inch 18g echogenic needle was then  inserted approximately midline, mid-thigh and advanced In-plane with Ultrasound guidance.  Normal Saline PSF was utilized for hydrodissection of tissue.  The Vastus medialis and Sartorius  muscle where visualized and the needle tip was placed in the adductor canal,  lateral to the femoral artery.  LA injection spread was visualized, injection was incremental 1-5ml, injection pressure was normal or little, no intraneural injection, no vascular injection.  LA dose was injected thru the needle(see dose above).  A BBraun 20g wire stylet catheter was placed via the needle with ultrasound visualization and confirmation with NS fluid bolus. The catheter insertion site was sealed with exofin tissue adhesive. The labeled catheter was then coiled and secured to skin with benzoin,  steristrips and CHG transparent dressing.  Appropriate labels were applied.  Thank you.

## 2021-11-10 ENCOUNTER — TRANSCRIBE ORDERS (OUTPATIENT)
Dept: ADMINISTRATIVE | Facility: HOSPITAL | Age: 86
End: 2021-11-10

## 2021-11-10 VITALS
DIASTOLIC BLOOD PRESSURE: 60 MMHG | SYSTOLIC BLOOD PRESSURE: 142 MMHG | RESPIRATION RATE: 18 BRPM | HEART RATE: 62 BPM | BODY MASS INDEX: 35.33 KG/M2 | HEIGHT: 62 IN | OXYGEN SATURATION: 96 % | TEMPERATURE: 98.2 F | WEIGHT: 192 LBS

## 2021-11-10 DIAGNOSIS — R79.89 ELEVATED SERUM CREATININE: Primary | ICD-10-CM

## 2021-11-10 LAB
ANION GAP SERPL CALCULATED.3IONS-SCNC: 11 MMOL/L (ref 5–15)
BUN SERPL-MCNC: 25 MG/DL (ref 8–23)
BUN/CREAT SERPL: 18.1 (ref 7–25)
CALCIUM SPEC-SCNC: 8.6 MG/DL (ref 8.6–10.5)
CHLORIDE SERPL-SCNC: 105 MMOL/L (ref 98–107)
CO2 SERPL-SCNC: 21 MMOL/L (ref 22–29)
CREAT SERPL-MCNC: 1.38 MG/DL (ref 0.76–1.27)
DEPRECATED RDW RBC AUTO: 43 FL (ref 37–54)
ERYTHROCYTE [DISTWIDTH] IN BLOOD BY AUTOMATED COUNT: 13.1 % (ref 12.3–15.4)
GFR SERPL CREATININE-BSD FRML MDRD: 49 ML/MIN/1.73
GLUCOSE BLDC GLUCOMTR-MCNC: 128 MG/DL (ref 70–130)
GLUCOSE BLDC GLUCOMTR-MCNC: 177 MG/DL (ref 70–130)
GLUCOSE SERPL-MCNC: 164 MG/DL (ref 65–99)
HCT VFR BLD AUTO: 38 % (ref 37.5–51)
HGB BLD-MCNC: 13.1 G/DL (ref 13–17.7)
MCH RBC QN AUTO: 31.2 PG (ref 26.6–33)
MCHC RBC AUTO-ENTMCNC: 34.5 G/DL (ref 31.5–35.7)
MCV RBC AUTO: 90.5 FL (ref 79–97)
PLATELET # BLD AUTO: 177 10*3/MM3 (ref 140–450)
PMV BLD AUTO: 11.9 FL (ref 6–12)
POTASSIUM SERPL-SCNC: 4.5 MMOL/L (ref 3.5–5.2)
RBC # BLD AUTO: 4.2 10*6/MM3 (ref 4.14–5.8)
SODIUM SERPL-SCNC: 137 MMOL/L (ref 136–145)
WBC # BLD AUTO: 14.35 10*3/MM3 (ref 3.4–10.8)

## 2021-11-10 PROCEDURE — 97165 OT EVAL LOW COMPLEX 30 MIN: CPT

## 2021-11-10 PROCEDURE — 80048 BASIC METABOLIC PNL TOTAL CA: CPT

## 2021-11-10 PROCEDURE — 63710000001 ALLOPURINOL 300 MG TABLET

## 2021-11-10 PROCEDURE — A9270 NON-COVERED ITEM OR SERVICE: HCPCS

## 2021-11-10 PROCEDURE — 97110 THERAPEUTIC EXERCISES: CPT

## 2021-11-10 PROCEDURE — A9270 NON-COVERED ITEM OR SERVICE: HCPCS | Performed by: ORTHOPAEDIC SURGERY

## 2021-11-10 PROCEDURE — 99024 POSTOP FOLLOW-UP VISIT: CPT | Performed by: ORTHOPAEDIC SURGERY

## 2021-11-10 PROCEDURE — 97535 SELF CARE MNGMENT TRAINING: CPT

## 2021-11-10 PROCEDURE — 63710000001 OXYCODONE 5 MG TABLET: Performed by: ORTHOPAEDIC SURGERY

## 2021-11-10 PROCEDURE — 63710000001 INSULIN LISPRO (HUMAN) PER 5 UNITS

## 2021-11-10 PROCEDURE — 85027 COMPLETE CBC AUTOMATED: CPT | Performed by: ORTHOPAEDIC SURGERY

## 2021-11-10 PROCEDURE — 63710000001 LISINOPRIL 20 MG TABLET

## 2021-11-10 PROCEDURE — 63710000001 ASPIRIN EC 325 MG TABLET DELAYED-RELEASE: Performed by: ORTHOPAEDIC SURGERY

## 2021-11-10 PROCEDURE — 63710000001 MELOXICAM 7.5 MG TABLET: Performed by: ORTHOPAEDIC SURGERY

## 2021-11-10 PROCEDURE — 97116 GAIT TRAINING THERAPY: CPT

## 2021-11-10 PROCEDURE — 82962 GLUCOSE BLOOD TEST: CPT

## 2021-11-10 PROCEDURE — 63710000001 CARBIDOPA-LEVODOPA 25-100 MG TABLET

## 2021-11-10 PROCEDURE — 63710000001 METOPROLOL TARTRATE 25 MG TABLET

## 2021-11-10 RX ORDER — ACETAMINOPHEN 500 MG
1000 TABLET ORAL EVERY 8 HOURS
Qty: 42 TABLET | Refills: 0
Start: 2021-11-10 | End: 2021-11-17

## 2021-11-10 RX ORDER — ASPIRIN 325 MG
325 TABLET, DELAYED RELEASE (ENTERIC COATED) ORAL DAILY
Qty: 30 TABLET | Refills: 0 | Status: SHIPPED | OUTPATIENT
Start: 2021-11-11 | End: 2021-12-11

## 2021-11-10 RX ORDER — OXYCODONE HYDROCHLORIDE 5 MG/1
5 TABLET ORAL EVERY 4 HOURS PRN
Qty: 40 TABLET | Refills: 0 | Status: SHIPPED | OUTPATIENT
Start: 2021-11-10 | End: 2022-01-10

## 2021-11-10 RX ORDER — MELOXICAM 7.5 MG/1
7.5 TABLET ORAL DAILY
Qty: 15 TABLET | Refills: 0 | Status: SHIPPED | OUTPATIENT
Start: 2021-11-10 | End: 2021-11-10 | Stop reason: HOSPADM

## 2021-11-10 RX ORDER — DOCUSATE SODIUM 100 MG/1
100 CAPSULE, LIQUID FILLED ORAL 2 TIMES DAILY
Qty: 30 CAPSULE | Refills: 0 | Status: SHIPPED | OUTPATIENT
Start: 2021-11-10 | End: 2021-11-25

## 2021-11-10 RX ADMIN — CARBIDOPA AND LEVODOPA 1 TABLET: 25; 100 TABLET ORAL at 08:35

## 2021-11-10 RX ADMIN — MELOXICAM 15 MG: 7.5 TABLET ORAL at 08:35

## 2021-11-10 RX ADMIN — SODIUM CHLORIDE 120 ML/HR: 9 INJECTION, SOLUTION INTRAVENOUS at 01:06

## 2021-11-10 RX ADMIN — OXYCODONE 5 MG: 5 TABLET ORAL at 08:45

## 2021-11-10 RX ADMIN — INSULIN LISPRO 2 UNITS: 100 INJECTION, SOLUTION INTRAVENOUS; SUBCUTANEOUS at 08:46

## 2021-11-10 RX ADMIN — ALLOPURINOL 300 MG: 300 TABLET ORAL at 08:35

## 2021-11-10 RX ADMIN — ASPIRIN 325 MG: 325 TABLET, COATED ORAL at 08:35

## 2021-11-10 RX ADMIN — LISINOPRIL 20 MG: 20 TABLET ORAL at 08:35

## 2021-11-10 RX ADMIN — METOPROLOL TARTRATE 25 MG: 25 TABLET, FILM COATED ORAL at 08:35

## 2021-11-10 NOTE — DISCHARGE SUMMARY
Patient Name: Raleigh Paredes  MRN: [id number]  : 1934  DOS: 11/10/2021    Attending: Ned Pizarro MD    Primary Care Provider: Raleigh Subramanian MD    Date of Admission:.2021  5:22 AM    Date of Discharge:  11/10/2021    Discharge Diagnosis:   Status post total left knee replacement    Primary osteoarthritis of left knee    Gout    HTN (hypertension)    DM (diabetes mellitus) (CMS/[facility])- new dx    Leukocytosis, mild, likely reactive    Acute postoperative pain      Hospital Course    At admit:      Patient is a pleasant 87 y.o. male presented for scheduled surgery by .      Per his note (The patient is a 87 y.o. male with debilitating left knee pain secondary to osteoarthritis that failed to improve in spite of conservative treatment .  Options have been discussed at length with the patient and the patient has had an extended course of conservative treatment without long-term benefit. The patient has reached the point where the patient desires total knee arthroplasty surgery and understands the risks, benefits, and alternatives. Consent was obtained. Please see my office notes for details with regard to preoperative counseling and operative rationale.)      He underwent left total knee arthroplasty under spinal anesthesia.      Surgery went well and he was admitted for further medical management. Adductor canal nerve block catheter was placed by acute pain service.     Seen in PACU, doing fairly well . No nausea, vomiting, or shortness of breath.     No history of DVT or PE.     After admit:    Patient was provided pain medications as needed for pain control, along with adductor canal nerve block infusion of Ropivacaine.    Adjustments were made to pain medications to optimize postop pain management.   Risks and benefits of opiate medications discussed with patient.  Antonio report in chart was reviewed prior to discharge.    He was seen by PT and OT and has progressed well over his stay.    He  used an IS for atelectasis prophylaxis and aspirin along with mechanicals for DVT prophylaxis.  Home medications were resumed as appropriate, and labs were monitored and remained fairly stable.     With the progress he has made, he is ready for DC home today.      He will have an Arrow pump (instructed on it during this admit).    Discussed with patient regarding plan and he shows understanding and agreement.    Patient will have HHPT following discharge.      Procedures Performed    DATE OF PROCEDURE: 11/09/21     PREOPERATIVE DIAGNOSIS: left knee arthritis       POSTOPERATIVE DIAGNOSIS:  left knee arthritis     PROCEDURES PERFORMED:   left total knee arthroplasty with [employer] components (#5 cruciate retaining femur, #4 tibia, 10 mm polyethylene, with 32 three peg patella).      SURGEON: Ned Pizarro MD    Pertinent Test Results:    I reviewed the patient's new clinical results.   Results from last 7 days   Lab Units 11/10/21  0639   WBC 10*3/mm3 14.35*   HEMOGLOBIN g/dL 13.1   HEMATOCRIT % 38.0   PLATELETS 10*3/mm3 177     Results from last 7 days   Lab Units 11/10/21  0639 11/09/21  0628   SODIUM mmol/L 137  --    POTASSIUM mmol/L 4.5 4.2   CHLORIDE mmol/L 105  --    CO2 mmol/L 21.0*  --    BUN mg/dL 25*  --    CREATININE mg/dL 1.38*  --    CALCIUM mg/dL 8.6  --    GLUCOSE mg/dL 164*  --      Results for BLAISE SHELTON (MRN [id number]) as of 11/10/2021 10:49   Ref. Range 11/9/2021 13:23 11/9/2021 20:28 11/10/2021 06:39 11/10/2021 07:53   Glucose Latest Ref Range: 70 - 130 mg/dL 173 (H) 214 (H) 164 (H) 177 (H)     I reviewed the patient's new imaging including images and reports.      Physical therapy: Patient ambulated 200 feet with RW, CGAx2, step through gait pattern, limited by fatigue. ROM to be initiated POD#1. Mild residual numbness in L LE from spinal but independent with L SLR and no knee buckling with weight bearing. Encouraged patient to ambulate with nursing again later this PM.  "Recommend d/c home with family and HHPT.      Discharge Assessment:    Vital Signs  Visit Vitals  /60   Pulse 62   Temp 98.2 °F (36.8 °C) (Oral)   Resp 18   Ht 157.5 cm (62\")   Wt 87.1 kg (192 lb)   SpO2 96%   BMI 35.12 kg/m²     Temp (24hrs), Av.7 °F (36.5 °C), Min:97.3 °F (36.3 °C), Max:98.2 °F (36.8 °C)      General Appearance:    Alert, cooperative, in no acute distress   Lungs:     Clear to auscultation,respirations regular, even and unlabored    Heart:    Regular rhythm and normal rate, normal S1 and S2   Abdomen:     Normal bowel sounds, no masses, no organomegaly, soft non-tender, non-distended, no guarding, no rebound tenderness   Extremities:   CDI dressing surgical knee, left. ACB cath present, arrow pump. Moves all extremities well, no edema, no cyanosis, no redness   Pulses:   Pulses palpable and equal bilaterally   Skin:   No bleeding, bruising or rash   Neurologic:   Cranial nerves 2 - 12 grossly intact, sensation intact, Flexion and dorsiflexion intact bilateral feet.       Discharge Disposition: Home    Discharge Medications:     Discharge Medications      New Medications      Instructions Start Date   acetaminophen 500 MG tablet  Commonly known as: TYLENOL   1,000 mg, Oral, Every 8 Hours, Take every 8 hours  as needed after 1 week      aspirin  MG tablet   Take 1 tablet by mouth Daily.   Start Date: 2021     oxyCODONE 5 MG immediate release tablet  Commonly known as: Roxicodone   5 mg, Oral, Every 4 Hours PRN      Ropivacine HCl-NaCl  Commonly known as: NAROPIN  Notes to patient: Remove when empty    10 mL/hr (20 mg/hr), Peripheral Nerve, Continuous      Stool Softener 100 MG capsule  Generic drug: docusate sodium   Take 1 capsule by mouth 2 (Two) Times a Day.         Continue These Medications      Instructions Start Date   allopurinol 300 MG tablet  Commonly known as: ZYLOPRIM   300 mg, Oral, Daily      carbidopa-levodopa  MG per tablet  Commonly known as: " SINEMET   1 tablet, Oral, 4 Times Daily      gabapentin 300 MG capsule  Commonly known as: NEURONTIN   300 mg, Oral, Nightly      glipizide 5 MG tablet  Commonly known as: GLUCOTROL   5 mg, Oral, Daily      lisinopril-hydrochlorothiazide 20-12.5 MG per tablet  Commonly known as: PRINZIDE,ZESTORETIC   1 tablet, Oral, Daily      metoprolol tartrate 25 MG tablet  Commonly known as: LOPRESSOR   25 mg, Oral, Daily      triamcinolone 0.1 % cream  Commonly known as: KENALOG   No dose, route, or frequency recorded.             Discharge Diet:   Diet Instructions    Regular Consistent Carb diet            Activity at Discharge: WBAT LLE  Activity Instructions    Ambulate frequently as tolerated.               Follow-up Appointments:  Dr. Pizarro per his orders      CHARLY Grey  11/10/21  10:49 EST

## 2021-11-10 NOTE — THERAPY DISCHARGE NOTE
Patient Name: Raleigh Paredes  : 1934    MRN: [id number]                              Today's Date: 11/10/2021       Admit Date: 2021    Visit Dx:     ICD-10-CM ICD-9-CM   1. Status post total left knee replacement  Z96.652 V43.65   2. Primary osteoarthritis of left knee  M17.12 715.16     Patient Active Problem List   Diagnosis   • Hypertensive cardiovascular disease   • Palpitations   • Gout   • Moderate obesity   • Psoriasis   • Colon polyps   • Primary osteoarthritis of right knee   • HTN (hypertension)   • Status post total right knee replacement   • DM (diabetes mellitus) (CMS/HCC)- new dx   • Leukocytosis, mild, likely reactive   • Acute postoperative pain   • Parkinson's disease (HCC)   • Primary osteoarthritis of left knee   • Status post total left knee replacement     Past Medical History:   Diagnosis Date   • Arthritis     knees and hands    • Borderline diabetes    • Cataract     bilat - may going to have surgery    • Colon polyps 2017   • Diverticulitis     h/o   • Diverticulosis    • Gout 2017   • History of COVID-19 2021    Asymptomatic   • History of kidney stones     x2   • Hypertension    • Hypertensive cardiovascular disease 2017   • Moderate obesity 2017   • Palpitations 2017   • Psoriasis 2017   • Skin cancer     basal from left arm    • Tinnitus     left    • Wears glasses    • Wears partial dentures     lower      Past Surgical History:   Procedure Laterality Date   • CARDIAC CATHETERIZATION     • CATARACT EXTRACTION EXTRACAPSULAR W/ INTRAOCULAR LENS IMPLANTATION Bilateral    • COLONOSCOPY     • CYST REMOVAL      Cyst removed from spine, .   • INGUINAL HERNIA REPAIR Bilateral    • KNEE CARTILAGE SURGERY Right     Right knee removal of cartilage, , Dr. Dick.     • SKIN CANCER EXCISION      basal removed from left forear    • TONSILLECTOMY     • TOTAL KNEE ARTHROPLASTY Right 2018    Procedure: TOTAL KNEE ARTHROPLASTY  RIGHT;  Surgeon: Ned Pizarro MD;  Location: [facility] OR;  Service: Orthopedics   • TOTAL KNEE ARTHROPLASTY Left 11/9/2021    Procedure: TOTAL KNEE ARTHROPLASTY LEFT;  Surgeon: Ned Pizarro MD;  Location: [facility] OR;  Service: Orthopedics;  Laterality: Left;   • WISDOM TOOTH EXTRACTION      all removed       General Information     Row Name 11/10/21 1027          Physical Therapy Time and Intention    Document Type therapy note (daily note); discharge treatment; discharge evaluation/summary  -LR     Mode of Treatment physical therapy; individual therapy  -LR     Row Name 11/10/21 1027          General Information    Patient Profile Reviewed yes  -LR     Existing Precautions/Restrictions fall; other (see comments)  L adductor canal nerve catheter  -LR     Barriers to Rehab previous functional deficit  -LR     Row Name 11/10/21 1027          Cognition    Orientation Status (Cognition) oriented x 4  -LR     Row Name 11/10/21 1027          Safety Issues, Functional Mobility    Safety Issues Affecting Function (Mobility) insight into deficits/self-awareness; awareness of need for assistance  -LR     Impairments Affecting Function (Mobility) strength; pain; endurance/activity tolerance; range of motion (ROM)  -LR           User Key  (r) = Recorded By, (t) = Taken By, (c) = Cosigned By    Initials Name Provider Type    LR Diana Houston, PT Physical Therapist               Mobility     Row Name 11/10/21 1027          Bed Mobility    Supine-Sit [place] (Bed Mobility) not tested  -LR     Comment (Bed Mobility) [facility] on arrival and at end of treatment.  -LR     Row Name 11/10/21 1027          Transfers    Comment (Transfers) Verbal cues to push up from chair to stand and to reach back for chair to lower into sitting. Verbal cues to step L LE out before t/f for comfort.  -LR     Row Name 11/10/21 1027          Bed-Chair Transfer    Bed-Chair [place] (Transfers) not tested  -LR     Row Name 11/10/21 1027           Sit-Stand Transfer    Sit-Stand [place] (Transfers) verbal cues; standby assist  -LR     Assistive Device (Sit-Stand Transfers) walker, front-wheeled  -LR     Row Name 11/10/21 1027          Gait/Stairs (Locomotion)    [place] Level (Gait) verbal cues; contact guard  -LR     Assistive Device (Gait) walker, front-wheeled  -LR     Distance in Feet (Gait) 350  -LR     Deviations/Abnormal Patterns (Gait) bilateral deviations; daniel decreased; gait speed decreased; stride length decreased; left sided deviations; antalgic  -LR     Bilateral Gait Deviations forward flexed posture; heel strike decreased  -LR     Left Sided Gait Deviations weight shift ability decreased  -LR     [place] Level (Stairs) verbal cues; contact guard  -LR     Assistive Device (Stairs) walker, front-wheeled  -LR     Handrail Location (Stairs) none  -LR     Number of Steps (Stairs) 1  -LR     Ascending Technique (Stairs) step-to-step  -LR     Descending Technique (Stairs) step-to-step  -LR     Comment (Gait/Stairs) Patient ambulated with step through gait pattern at slow pace. Verbal cues for increased L LE weight bearing/stance phase, decreased UE weight bearing, equal stance phase, increased step length, and increased L knee flexion during swing phase. Improved with cues for correction. Gait limited by fatigue and pain. Verbal cues to back up to step with RW and to use RW for UE support. Cues to step up backwards with strong LE first and down with weak LE first. No LOB or unsteadiness with stairs.  -LR     Row Name 11/10/21 1027          Mobility    Extremity Weight-bearing Status left lower extremity  -LR     Left Lower Extremity (Weight-bearing Status) weight-bearing as tolerated (WBAT)  -LR           User Key  (r) = Recorded By, (t) = Taken By, (c) = Cosigned By    Initials Name Provider Type    LR Diana Houston PT Physical Therapist               Obj/Interventions     Row Name 11/10/21 1027          Motor  Skills    Therapeutic Exercise ankle; knee; other (see comments)  cues for technique; no assist required, independent with SLR  -     Row Name 11/10/21 1027          Knee (Therapeutic Exercise)    Knee (Therapeutic Exercise) AROM (active range of motion); strengthening exercise; isometric exercises  -     Knee AROM (Therapeutic Exercise) left; heel slides; sitting; supine; 10 repetitions; 5 repetitions  -     Knee Isometrics (Therapeutic Exercise) left; quad sets; sitting; 5 repetitions; 10 repetitions  -     Knee Strengthening (Therapeutic Exercise) left; SLR (straight leg raise); SAQ (short arc quad); LAQ (long arc quad); sitting; 10 repetitions; 5 repetitions  standing calf raises x15 reps  -     Row Name 11/10/21 1027          Ankle (Therapeutic Exercise)    Ankle (Therapeutic Exercise) AROM (active range of motion)  -     Ankle AROM (Therapeutic Exercise) bilateral; dorsiflexion; plantarflexion; sitting; 10 repetitions; 5 repetitions  -     Row Name 11/10/21 1027          Balance    Balance Assessment sitting static balance; sitting dynamic balance; standing static balance; standing dynamic balance  -     Static Sitting Balance WFL; unsupported; sitting in chair  -     Dynamic Sitting Balance WFL; unsupported; sitting in chair  -     Static Standing Balance WFL; supported; standing  -     Dynamic Standing Balance WFL; supported; standing  -     Row Name 11/10/21 1027          General Lower Extremity Assessment (Range of Motion)    Comment: Lower Extremity ROM 7-105 degrees L knee; lacking 7 degrees extension AROM L knee; L knee AAROM flexion 105 degrees in sitting  -           User Key  (r) = Recorded By, (t) = Taken By, (c) = Cosigned By    Initials Name Provider Type    LR Diana Houston, PT Physical Therapist               Goals/Plan     Row Name 11/10/21 1027          Bed Mobility Goal 1 (PT)    Activity/Assistive Device (Bed Mobility Goal 1, PT) sit to supine/supine to sit   -LR     [place] Level/Cues Needed (Bed Mobility Goal 1, PT) modified independence  -LR     Time Frame (Bed Mobility Goal 1, PT) long term goal (LTG); 3 days  -LR     Progress/Outcomes (Bed Mobility Goal 1, PT) goal not met; discharged from facility  -     Row Name 11/10/21 1027          Transfer Goal 1 (PT)    Activity/Assistive Device (Transfer Goal 1, PT) sit-to-stand/stand-to-sit; walker, rolling  -LR     [place] Level/Cues Needed (Transfer Goal 1, PT) modified independence  -LR     Time Frame (Transfer Goal 1, PT) long term goal (LTG); 3 days  -LR     Progress/Outcome (Transfer Goal 1, PT) good progress toward goal; goal not met; discharged from facility  -     Row Name 11/10/21 1027          Gait Training Goal 1 (PT)    Activity/Assistive Device (Gait Training Goal 1, PT) gait (walking locomotion); walker, rolling  -LR     [place] Level (Gait Training Goal 1, PT) modified independence  -LR     Distance (Gait Training Goal 1, PT) 500 feet  -LR     Time Frame (Gait Training Goal 1, PT) long term goal (LTG); 3 days  -LR     Progress/Outcome (Gait Training Goal 1, PT) good progress toward goal; goal not met; discharged from [facility]  -     Row Name 11/10/21 1027          ROM Goal 1 (PT)    ROM Goal 1 (PT) 0-90 degrees AAROM L knee  -LR     Time Frame (ROM Goal 1, PT) long-term goal (LTG); 3 days  -LR     Progress/Outcome (ROM Goal 1, PT) good progress toward goal; goal partially met; discharged from facility  achieved flexion goal but not extension goal  -     Row Name 11/10/21 1027          Stairs Goal 1 (PT)    Activity/Assistive Device (Stairs Goal 1, PT) ascending stairs; descending stairs; step-to-step; walker, rolling  -LR     [place] Level/Cues Needed (Stairs Goal 1, PT) contact guard assist  -LR     Number of Stairs (Stairs Goal 1, PT) 1  -LR     Time Frame (Stairs Goal 1, PT) long term goal (LTG); 3 days  -LR     Progress/Outcome (Stairs Goal 1, PT) goal met  -LR           User  Key  (r) = Recorded By, (t) = Taken By, (c) = Cosigned By    Initials Name Provider Type    Diana Price, PT Physical Therapist               Clinical Impression     Row Name 11/10/21 1027          Pain    Additional Documentation Pain Scale: Numbers Pre/Post-Treatment (Group)  -LR     Row Name 11/10/21 1027          Pain Scale: Numbers Pre/Post-Treatment    Pretreatment Pain Rating 3/10  -LR     Posttreatment Pain Rating 4/10  -LR     Pain Location - Side Left  -LR     Pain Location - Orientation posterior  -LR     Pain Location knee  -LR     Pain Intervention(s) Ambulation/increased activity; Cold applied; Repositioned  -LR     Row Name 11/10/21 1027          Plan of Care Review    Plan of Care Reviewed With patient; son  -LR     Progress improving  -LR     Outcome Summary Patient ambulated 350 feet with RW, CGA, step through gait pattern, limited by fatigue and pain. Patient climbed 1 step backwards with RW and CGA with no difficulty. ROM progressing well, 7-105 degrees. Patient has been d/c home with family and HHPT.  -LR     Row Name 11/10/21 1027          Therapy Assessment/Plan (PT)    Rehab Potential (PT) good, to achieve stated therapy goals  -LR     Criteria for Skilled Interventions Met (PT) yes; meets criteria; skilled treatment is necessary  -LR     Row Name 11/10/21 1027          Positioning and Restraints    Pre-Treatment Position sitting in chair/recliner  -LR     Post Treatment Position chair  -LR     In Chair notified nsg; reclined; sitting; call light within reach; encouraged to call for assist; exit alarm on; with family/caregiver; legs elevated  -LR           User Key  (r) = Recorded By, (t) = Taken By, (c) = Cosigned By    Initials Name Provider Type    Daina Price, PT Physical Therapist               Outcome Measures     Row Name 11/10/21 1027 11/10/21 0846       How much help from another person do you currently need...    Turning from your back to your side while  in flat bed without using bedrails? 3  -LR 3  -LM    Moving from lying on back to sitting on the side of a flat bed without bedrails? 3  -LR 3  -LM    Moving to and from a bed to a chair (including a wheelchair)? 3  -LR 3  -LM    Standing up from a chair using your arms (e.g., wheelchair, bedside chair)? 3  -LR 3  -LM    Climbing 3-5 steps with a railing? 3  -LR 3  -LM    To walk in hospital room? 3  -LR 3  -LM    AM-PAC 6 Clicks Score (PT) 18  -LR 18  -LM    Row Name 11/10/21 1027 11/10/21 0853       Functional Assessment    Outcome Measure Options AM-PAC 6 Clicks Basic Mobility (PT)  -LR AM-PAC 6 Clicks Daily Activity (OT)  -TB          User Key  (r) = Recorded By, (t) = Taken By, (c) = Cosigned By    Initials Name Provider Type    TB Penelope Blakely, OT Occupational Therapist    Diana Price, PT Physical Therapist    LM Dianne Jose, RN Registered Nurse              Physical Therapy Education                 Title: PT OT SLP Therapies (In Progress)     Topic: Physical Therapy (Done)     Point: Mobility training (Done)     Learning Progress Summary           Patient Acceptance, E,D,H, OPHELIA,GIOVANY by LR at 11/10/2021 1027    Comment: Issued and reviewed written/illustrated HEP. Educated on precautions, weight bearing status, correct sit<->stand t/f technique, correct gait mechanics, correct stair training technique, correct car t/f technique, and safety with mobility.    Acceptance, E,D, OPHELIA,EDIN by LR at 11/9/2021 1335    Comment: Educated on precautions, weight bearing status, correct supine to sit t/f technique, correct sit<->stand t/f technique, correct gait mechanics, safety with mobility, and progression of POC.   Family Acceptance, E,D,H, OPHELIA,GIOVANY by LR at 11/10/2021 1027    Comment: Issued and reviewed written/illustrated HEP. Educated on precautions, weight bearing status, correct sit<->stand t/f technique, correct gait mechanics, correct stair training technique, correct car t/f technique,  and safety with mobility.                   Point: Home exercise program (Done)     Learning Progress Summary           Patient Acceptance, E,D,H, VU,DU by LR at 11/10/2021 1027    Comment: Issued and reviewed written/illustrated HEP. Educated on precautions, weight bearing status, correct sit<->stand t/f technique, correct gait mechanics, correct stair training technique, correct car t/f technique, and safety with mobility.    Acceptance, E,D, VU,NR by LR at 11/9/2021 1335    Comment: Educated on precautions, weight bearing status, correct supine to sit t/f technique, correct sit<->stand t/f technique, correct gait mechanics, safety with mobility, and progression of POC.   Family Acceptance, E,D,H, VU,DU by LR at 11/10/2021 1027    Comment: Issued and reviewed written/illustrated HEP. Educated on precautions, weight bearing status, correct sit<->stand t/f technique, correct gait mechanics, correct stair training technique, correct car t/f technique, and safety with mobility.                   Point: Body mechanics (Done)     Learning Progress Summary           Patient Acceptance, E,D,H, VU,DU by LR at 11/10/2021 1027    Comment: Issued and reviewed written/illustrated HEP. Educated on precautions, weight bearing status, correct sit<->stand t/f technique, correct gait mechanics, correct stair training technique, correct car t/f technique, and safety with mobility.    Acceptance, E,D, VU,NR by LR at 11/9/2021 1335    Comment: Educated on precautions, weight bearing status, correct supine to sit t/f technique, correct sit<->stand t/f technique, correct gait mechanics, safety with mobility, and progression of POC.   Family Acceptance, E,D,H, VU,DU by LR at 11/10/2021 1027    Comment: Issued and reviewed written/illustrated HEP. Educated on precautions, weight bearing status, correct sit<->stand t/f technique, correct gait mechanics, correct stair training technique, correct car t/f technique, and safety with mobility.                    Point: Precautions (Done)     Learning Progress Summary           Patient Acceptance, E,D,H, VU,DU by LR at 11/10/2021 1027    Comment: Issued and reviewed written/illustrated HEP. Educated on precautions, weight bearing status, correct sit<->stand t/f technique, correct gait mechanics, correct stair training technique, correct car t/f technique, and safety with mobility.    Acceptance, E,D, VU,NR by LR at 11/9/2021 1335    Comment: Educated on precautions, weight bearing status, correct supine to sit t/f technique, correct sit<->stand t/f technique, correct gait mechanics, safety with mobility, and progression of POC.   Family Acceptance, E,D,H, VU,DU by LR at 11/10/2021 1027    Comment: Issued and reviewed written/illustrated HEP. Educated on precautions, weight bearing status, correct sit<->stand t/f technique, correct gait mechanics, correct stair training technique, correct car t/f technique, and safety with mobility.                               User Key     Initials Effective Dates Name Provider Type Discipline     06/16/21 -  Diana Houston, PT Physical Therapist PT              PT Recommendation and Plan  Planned Therapy Interventions (PT): balance training, bed mobility training, gait training, home exercise program, patient/family education, ROM (range of motion), stair training, strengthening, transfer training  Plan of Care Reviewed With: patient, son  Progress: improving  Outcome Summary: Patient ambulated 350 feet with RW, CGA, step through gait pattern, limited by fatigue and pain. Patient climbed 1 step backwards with RW and CGA with no difficulty. ROM progressing well, 7-105 degrees. Patient has been d/c home with family and HHPT.     Time Calculation:    PT Charges     Row Name 11/10/21 1027             Time Calculation    Start Time 1027  -LR      PT Received On 11/10/21  -      PT Goal Re-Cert Due Date 11/19/21  -              Timed Charges    85662 - PT  Therapeutic Exercise Minutes 11  -LR      36982 - Gait Training Minutes  13  -LR              Total Minutes    Timed Charges Total Minutes 24  -LR       Total Minutes 24  -LR            User Key  (r) = Recorded By, (t) = Taken By, (c) = Cosigned By    Initials Name Provider Type    LR Diana Houston, PT Physical Therapist              Therapy Charges for Today     Code Description Service Date Service Provider Modifiers Qty    93040876318 HC GAIT TRAINING EA 15 MIN 11/9/2021 Diana Houston, PT GP 1    80952722598 HC PT THER SUPP EA 15 MIN 11/9/2021 Diana Houston, PT GP 2    19102013888 HC PT EVAL LOW COMPLEXITY 3 11/9/2021 Diana Houston, PT GP 1    93490697786 HC PT THER PROC EA 15 MIN 11/10/2021 Diana Houston, PT GP 1    83275979939 HC GAIT TRAINING EA 15 MIN 11/10/2021 Diana Houston, PT GP 1          PT G-Codes  Outcome Measure Options: AM-PAC 6 Clicks Basic Mobility (PT)  AM-PAC 6 Clicks Score (PT): 18  AM-PAC 6 Clicks Score (OT): 17    PT Discharge Summary  Anticipated Discharge Disposition (PT): home with assist, home with home health  Reason for Discharge: Discharge from facility  Outcomes Achieved: Patient able to partially acheive established goals  Discharge Destination: Home with home health, Home with assist    Diana Houston, PT  11/10/2021

## 2021-11-10 NOTE — PROGRESS NOTES
[facility]    Acute pain service Inpatient Progress Note    Patient Name: Raleigh Paredes  :  1934  MRN:  [id number]        Acute Pain  Service Inpatient Progress Note:    Analgesia:Excellent  Pain Score:0/10  LOC: alert and awake  Resp Status: room air  Cardiac: VS stable  Side Effects:None  Catheter Site:clean, dressing intact and dry  Cath type: peripheral nerve cath with ON Q  Infusion rate: 10ml/hr  Catheter Plan:Catheter to remain Insitu and Continue catheter infusion rate unchanged  Comments: Anterior=0  Posterior=2

## 2021-11-10 NOTE — PLAN OF CARE
Goal Outcome Evaluation:  Plan of Care Reviewed With: patient A&Ox4. VSS.  Dressing to left leg C/D/I. Positive pedal pulses. Brisk cap refill. Ambulated in room without difficulty. Anticipating discharge this am.

## 2021-11-10 NOTE — PLAN OF CARE
Problem: Adult Inpatient Plan of Care  Goal: Plan of Care Review  Recent Flowsheet Documentation  Taken 11/10/2021 0848 by Penelope Blakely OT  Plan of Care Reviewed With: patient  Outcome Summary: OT IE completed. Pt is A&Ox4 and motivated to regain occupational independence. Min A bed mobility, no AE needed. CGA all in room mobility and transfers using RW. Education completed for home safety/fall prevention. Pt reports excellent pain control. Denies any bathroom DME needs. Requests RW for home. Appropriate AE issued and teaching completed. OT will d/c at this time. Anticipate pt's d/c to home today following PT. Recommend 24/7 supervision upon d/c.

## 2021-11-10 NOTE — DISCHARGE INSTRUCTIONS
May shower when Arrow infuser is removed.    Do not scrub or rub incision and after showering;  Replace compresso-.    Use ice packs and incentive spirometer.

## 2021-11-10 NOTE — CASE MANAGEMENT/SOCIAL WORK
Case Management Discharge Note      Final Note: I spoke with patient in regards to discharge planning. He included his son in law in the conversation. Patient, his wife Laura, son Raleigh and wife reside on a 1 level home with 1 step to enter. Patient has been independent with ADL's. He has the following DME: elevated commode, bath bench. He is fine with obtaining a rolling walker to be delivered by [facility] to his room prior to discharge. He denies the need for other DME. He has requested [facility] in home program transitioning to [facility]. Shahla Schwartz has received the referral and [facility] will reach out to him. He has [employer] Medicare with no concerns of coverage or benefits, no concerns with obtaining medications. He tells me he has advanced directives. His PCP is Raleigh Subramanian.         Selected Continued Care - Admitted Since 11/9/2021     Destination    No services have been selected for the patient.              Durable Medical Equipment Coordination complete.    Service Provider Selected Services Address Phone Fax Patient Preferred    [facility]  Durable Medical Equipment [address] [address] [phone] --          Dialysis/Infusion    No services have been selected for the patient.              Home Medical Care    No services have been selected for the patient.              Therapy Coordination complete.    Service Provider Selected Services Address Phone Fax Patient Preferred    [facility]  Outpatient Physical Therapy [address] [address] [phone] --       Internal Comment last updated by Meredith Heck RN 11/10/2021 1148    In home with transitions to out patient.                     Community Resources    No services have been selected for the patient.              Community & DME    No services have been selected for the patient.                       Final Discharge Disposition Code: 01 - home or  self-care

## 2021-11-10 NOTE — PROGRESS NOTES
"      [facility] [facility] [place]    Subjective      LOS: 0 days   Patient Care Team:  Raleigh Subramanian MD as PCP - General    CC: left knee pain    Interval History:   Patient comfortable with good pain control.    Objective      Vital Signs  Temp (24hrs), Av.5 °F (36.4 °C), Min:97.2 °F (36.2 °C), Max:98.2 °F (36.8 °C)      /63 (BP Location: Right arm, Patient Position: Lying)   Pulse 72   Temp 98.2 °F (36.8 °C) (Oral)   Resp 18   Ht 157.5 cm (62\")   Wt 87.1 kg (192 lb)   SpO2 96%   BMI 35.12 kg/m²     Examination:   Examination of the left knee: The wound is clean, dry, and intact.  Ankle dorsiflexion, ankle plantar flexion, and EHL are intact.  Sensation intact in the foot to light touch.  2+ dorsalis pedis pulse.  Straight leg raise is intact.    Labs:  Results from last 7 days   Lab Units 11/10/21  0639   WBC 10*3/mm3 14.35*   HEMOGLOBIN g/dL 13.1   HEMATOCRIT % 38.0   MCV fL 90.5   PLATELETS 10*3/mm3 177       Radiology:  Imaging Results (Last 24 Hours)     Procedure Component Value Units Date/Time    XR Knee 1 or 2 View Left [[id number]] Collected: 21 0959     Updated: 21 1010    Narrative:      EXAMINATION: XR KNEE 1 OR 2 VW LEFT-      INDICATION: Knee arthroplasty.      COMPARISON: 2021.     FINDINGS: AP and lateral views of the left knee status post left total  knee arthroplasty in excellent anatomic alignment with expected  postsurgical changes in the adjacent soft tissues.           Impression:      Status post left total knee arthroplasty in excellent  anatomic alignment.     D:  2021  E:  2021                PT:  Physical Therapy - Plan of Care Review - Outcome Summary:  Outcome Summary: Patient ambulated 200 feet with RW, CGAx2, step through gait pattern, limited by fatigue. ROM to be initiated POD#1. Mild residual numbness in L LE from spinal but independent with L SLR and no knee buckling with weight bearing. Encouraged patient to ambulate " with nursing again later this PM. Recommend d/c home with family and HHPT. (11/09/21 5447)]       Results Review:     I reviewed the patient's new clinical results.    Assessment and Plan     Assessment:   S/P left TKA - doing well      Status post total left knee replacement    Gout    HTN (hypertension)    DM (diabetes mellitus) (CMS/HCC)- new dx    Primary osteoarthritis of left knee      Plan for disposition: D/C home today - follow-up in 3 weeks as planned.      Future Appointments   Date Time Provider Department Center   11/29/2021  8:30 AM Isha Reaves PA-C [facility]   3/14/2022  8:30 AM Jemima Valdez APRN [facility]           Ned Pizarro MD  11/10/21  08:08 EST

## 2021-11-10 NOTE — THERAPY DISCHARGE NOTE
Acute Care - Occupational Therapy Discharge  [facility]    Patient Name: Raleigh Paredes  : 1934    MRN: [id number]                              Today's Date: 11/10/2021       Admit Date: 2021    Visit Dx:     ICD-10-CM ICD-9-CM   1. Status post total left knee replacement  Z96.652 V43.65   2. Primary osteoarthritis of left knee  M17.12 715.16     Patient Active Problem List   Diagnosis   • Hypertensive cardiovascular disease   • Palpitations   • Gout   • Moderate obesity   • Psoriasis   • Colon polyps   • Primary osteoarthritis of right knee   • HTN (hypertension)   • Status post total right knee replacement   • DM (diabetes mellitus) (CMS/HCC)- new dx   • Leukocytosis, mild, likely reactive   • Acute postoperative pain   • Parkinson's disease (HCC)   • Primary osteoarthritis of left knee   • Status post total left knee replacement     Past Medical History:   Diagnosis Date   • Arthritis     knees and hands    • Borderline diabetes    • Cataract     bilat - may going to have surgery    • Colon polyps 2017   • Diverticulitis     h/o   • Diverticulosis    • Gout 2017   • History of COVID-19 2021    Asymptomatic   • History of kidney stones     x2   • Hypertension    • Hypertensive cardiovascular disease 2017   • Moderate obesity 2017   • Palpitations 2017   • Psoriasis 2017   • Skin cancer     basal from left arm    • Tinnitus     left    • Wears glasses    • Wears partial dentures     lower      Past Surgical History:   Procedure Laterality Date   • CARDIAC CATHETERIZATION     • CATARACT EXTRACTION EXTRACAPSULAR W/ INTRAOCULAR LENS IMPLANTATION Bilateral    • COLONOSCOPY     • CYST REMOVAL      Cyst removed from spine, .   • INGUINAL HERNIA REPAIR Bilateral    • KNEE CARTILAGE SURGERY Right     Right knee removal of cartilage, , Dr. Dick.     • SKIN CANCER EXCISION      basal removed from left forear    • TONSILLECTOMY     • TOTAL KNEE  ARTHROPLASTY Right 11/29/2018    Procedure: TOTAL KNEE ARTHROPLASTY RIGHT;  Surgeon: Ned Pizarro MD;  Location: [facility];  Service: Orthopedics   • WISDOM TOOTH EXTRACTION      all removed       General Information     Row Name 11/10/21 0840          OT Time and Intention    Document Type evaluation  -TB     Mode of Treatment occupational therapy; individual therapy  -TB     Row Name 11/10/21 0840          General Information    Patient Profile Reviewed yes  -TB     Prior Level of Function min assist:; all household mobility; community mobility; transfer; bed mobility; ADL's  Pt reports recurrent falls at home and in community  -TB     Existing Precautions/Restrictions fall; other (see comments)  LLE AC block  -TB     Barriers to Rehab previous functional deficit  -TB     Row Name 11/10/21 0840          Occupational Profile    Reason for Services/Referral (Occupational Profile) to advance occupational engagement  -TB     Row Name 11/10/21 0840          Living Environment    Lives With spouse; child(santiago), adult; other (see comments)  Supportive family available to assist 24/7 at d/c  -TB     Row Name 11/10/21 0840          Home Main Entrance    Number of Stairs, Main Entrance one  -TB     Stair Railings, Main Entrance none  -TB     Row Name 11/10/21 0840          Stairs Within Home, Primary    Number of Stairs, Within Home, Primary none  -TB     Row Name 11/10/21 0840          Cognition    Orientation Status (Cognition) oriented x 4  -TB     Row Name 11/10/21 0840          Safety Issues, Functional Mobility    Safety Issues Affecting Function (Mobility) awareness of need for assistance; insight into deficits/self-awareness; judgment; safety precaution awareness; safety precautions follow-through/compliance; sequencing abilities  -TB     Impairments Affecting Function (Mobility) balance; endurance/activity tolerance; strength; range of motion (ROM)  -TB     Comment, Safety Issues/Impairments (Mobility) Pt up  with RW support and CGAx1  -TB           User Key  (r) = Recorded By, (t) = Taken By, (c) = Cosigned By    Initials Name Provider Type    TB Penelope Blakely OT Occupational Therapist               Mobility/ADL's     Row Name 11/10/21 0843          Bed Mobility    Bed Mobility supine-sit; scooting/bridging  -TB     Scooting/Bridging [place] (Bed Mobility) standby assist; verbal cues  -TB     Supine-Sit [place] (Bed Mobility) minimum assist (75% patient effort); verbal cues  -TB     Bed Mobility, Safety Issues decreased use of legs for bridging/pushing  -TB     Assistive Device (Bed Mobility) bed rails  -TB     Comment (Bed Mobility) Education, cues and assist for sequencing transition to EOB, increased time. Pt (+)SLR, no AE needed.  -TB     Row Name 11/10/21 0843          Transfers    Transfers sit-stand transfer; toilet transfer; bed-chair transfer  -TB     Comment (Transfers) Education and cues for hand placement, sequencing, and safety.  -TB     Bed-Chair [place] (Transfers) contact guard; verbal cues  -TB     Assistive Device (Bed-Chair Transfers) walker, front-wheeled  -TB     Sit-Stand [place] (Transfers) contact guard; verbal cues  -TB     [place] Level (Toilet Transfer) contact guard; verbal cues  -TB     Assistive Device (Toilet Transfer) raised toilet seat; grab bars/safety frame; walker, front-wheeled  -TB     Row Name 11/10/21 0843          Sit-Stand Transfer    Assistive Device (Sit-Stand Transfers) walker, front-wheeled  -TB     Row Name 11/10/21 0843          Toilet Transfer    Type (Toilet Transfer) sit-stand; stand-sit  -TB     Row Name 11/10/21 0843          Functional Mobility    Functional Mobility- Ind. Level contact guard assist  -TB     Functional Mobility- Device rolling walker  -TB     Functional Mobility-Distance (Feet) 30  -TB     Functional Mobility- Safety Issues balance decreased during turns; step length decreased; weight-shifting ability decreased  -TB      Functional Mobility- Comment Good effort  -TB     Row Name 11/10/21 0843          Activities of Daily Living    BADL Assessment/Intervention bathing; lower body dressing; upper body dressing; feeding; toileting  -TB     Row Name 11/10/21 0843          Mobility    Extremity Weight-bearing Status left lower extremity  -TB     Left Lower Extremity (Weight-bearing Status) weight-bearing as tolerated (WBAT)  -TB     Row Name 11/10/21 0843          Bathing Assessment/Intervention    [place] Level (Bathing) set up; distal lower extremities/feet  simulated activity  -TB     Assistive Devices (Bathing) long-handled sponge  -TB     Position (Bathing) unsupported sitting  -TB     Comment (Bathing) Education completed for ADL retraining.  -TB     Row Name 11/10/21 08          Lower Body Dressing Assessment/Training    [place] Level (Lower Body Dressing) don; shoes/slippers  -TB     Assistive Devices (Lower Body Dressing) long-handled shoe horn  -TB     Position (Lower Body Dressing) unsupported sitting  -TB     Comment (Lower Body Dressing) Education/demonstration completed for ADL retraining.  -TB     Row Name 11/10/21 08          Upper Body Dressing Assessment/Training    [place] Level (Upper Body Dressing) doff; don; pajama/robe; set up  -TB     Position (Upper Body Dressing) edge of bed sitting  -TB     Comment (Upper Body Dressing) Assist for IV lines  -TB     Row Name 11/10/21 0843          Self-Feeding Assessment/Training    [place] Level (Feeding) set up; prepare tray/open items; independent; scoop food and bring to mouth; liquids to mouth  -TB     Position (Self-Feeding) supported sitting  -TB     Row Name 11/10/21 0843          Toileting Assessment/Training    [place] Level (Toileting) adjust/manage clothing; perform perineal hygiene; supervision  -TB           User Key  (r) = Recorded By, (t) = Taken By, (c) = Cosigned By    Initials Name Provider Type    TB Penelope Blakely, OT  Occupational Therapist               Obj/Interventions     Row Name 11/10/21 0847          Sensory Assessment (Somatosensory)    Sensory Assessment (Somatosensory) UE sensation intact  -TB     Row Name 11/10/21 0847          Range of Motion Comprehensive    General Range of Motion bilateral upper extremity ROM WFL  -TB     Comment, General Range of Motion BUE AROM WFL for ADLs  -TB     Row Name 11/10/21 0847          Strength Comprehensive (MMT)    General Manual Muscle Testing (MMT) Assessment upper extremity strength deficits identified  -TB     Comment, General Manual Muscle Testing (MMT) Assessment Generalized weakness. BUE functionally 4-/5 proximal, 4/5   -TB     Row Name 11/10/21 0847          Balance    Balance Assessment sitting dynamic balance; sit to stand dynamic balance; standing dynamic balance  -TB     Dynamic Sitting Balance WFL; unsupported; sitting in chair; sitting, edge of bed  -TB     Sit to Stand Dynamic Balance mild impairment; supported; standing  -TB     Dynamic Standing Balance mild impairment; supported; standing  -TB     Balance Interventions sitting; standing; sit to stand; supported; static; dynamic; occupation based/functional task; UE activity with balance activity; weight shifting activity  -TB     Comment, Balance Pt up with RW support  -TB           User Key  (r) = Recorded By, (t) = Taken By, (c) = Cosigned By    Initials Name Provider Type    TB Penelope Blakely OT Occupational Therapist               Goals/Plan    No documentation.                Clinical Impression     Row Name 11/10/21 0895          Pain Assessment    Additional Documentation Pain Scale: Numbers Pre/Post-Treatment (Group)  -TB     Row Name 11/10/21 0802          Pain Scale: Numbers Pre/Post-Treatment    Pretreatment Pain Rating 0/10 - no pain  -TB     Posttreatment Pain Rating 2/10  -TB     Pain Location - Side Left  -TB     Pain Location - Orientation posterior  -TB     Pain Location knee  -TB      Pre/Posttreatment Pain Comment Pt tolerates dynamic EOB/OOB activity well. Increased posterior knee pain following positioning for hyperstretch.  -TB     Pain Intervention(s) Ambulation/increased activity; Repositioned; Cold applied  LLE AC block  -TB     Row Name 11/10/21 0848          Plan of Care Review    Plan of Care Reviewed With patient  -TB     Outcome Summary OT IE completed. Pt is A&Ox4 and motivated to regain occupational independence. Min A bed mobility, no AE needed. CGA all in room mobility and transfers using RW. Education completed for home safety/fall prevention. Pt reports excellent pain control. Denies any bathroom DME needs. Requests RW for home. Appropriate AE issued and teaching completed. OT will d/c at this time. Anticipate pt's d/c to home today following PT. Recommend 24/7 supervision upon d/c.  -TB     Row Name 11/10/21 0848          Therapy Assessment/Plan (OT)    Therapy Frequency (OT) evaluation only  -TB     Row Name 11/10/21 0848          Therapy Plan Review/Discharge Plan (OT)    Equipment Needs Upon Discharge (OT) walker, rolling  -TB     Anticipated Discharge Disposition (OT) home with assist; home with home health  -TB     Row Name 11/10/21 0848          Vital Signs    Pre Systolic BP Rehab --  RN cleared OT  -TB     O2 Delivery Pre Treatment room air  -TB     O2 Delivery Intra Treatment room air  -TB     O2 Delivery Post Treatment room air  -TB     Pre Patient Position Supine  -TB     Intra Patient Position Standing  -TB     Post Patient Position Sitting  -TB     Row Name 11/10/21 0848          Positioning and Restraints    Pre-Treatment Position in bed  -TB     Post Treatment Position chair  -TB     In Chair reclined; call light within reach; encouraged to call for assist; exit alarm on; waffle cushion; legs elevated  -TB           User Key  (r) = Recorded By, (t) = Taken By, (c) = Cosigned By    Initials Name Provider Type    TB Penelope Blakely, OT Occupational Therapist                Outcome Measures     Row Name 11/10/21 0853          How much help from another is currently needed...    Putting on and taking off regular lower body clothing? 2  -TB     Bathing (including washing, rinsing, and drying) 3  -TB     Toileting (which includes using toilet bed pan or urinal) 3  -TB     Putting on and taking off regular upper body clothing 3  -TB     Taking care of personal grooming (such as brushing teeth) 3  -TB     Eating meals 3  -TB     AM-PAC 6 Clicks Score (OT) 17  -TB     Row Name 11/10/21 0846          How much help from another person do you currently need...    Turning from your back to your side while in flat bed without using bedrails? 3  -LM     Moving from lying on back to sitting on the side of a flat bed without bedrails? 3  -LM     Moving to and from a bed to a chair (including a wheelchair)? 3  -LM     Standing up from a chair using your arms (e.g., wheelchair, bedside chair)? 3  -LM     Climbing 3-5 steps with a railing? 3  -LM     To walk in hospital room? 3  -LM     AM-PAC 6 Clicks Score (PT) 18  -LM     Row Name 11/10/21 0853          Functional Assessment    Outcome Measure Options AM-PAC 6 Clicks Daily Activity (OT)  -TB           User Key  (r) = Recorded By, (t) = Taken By, (c) = Cosigned By    Initials Name Provider Type     Penelope Blakely OT Occupational Therapist    Dianne Rivera RN Registered Nurse              Occupational Therapy Education                 Title: PT OT SLP Therapies (In Progress)     Topic: Occupational Therapy (In Progress)     Point: ADL training (Done)     Description:   Instruct learner(s) on proper safety adaptation and remediation techniques during self care or transfers.   Instruct in proper use of assistive devices.              Learning Progress Summary           Patient Acceptance, E,D, VU by  at 11/10/2021 0854                   Point: Home exercise program (Not Started)     Description:   Instruct  learner(s) on appropriate technique for monitoring, assisting and/or progressing therapeutic exercises/activities.              Learner Progress:  Not documented in this visit.          Point: Precautions (Not Started)     Description:   Instruct learner(s) on prescribed precautions during self-care and functional transfers.              Learner Progress:  Not documented in this visit.          Point: Body mechanics (Not Started)     Description:   Instruct learner(s) on proper positioning and spine alignment during self-care, functional mobility activities and/or exercises.              Learner Progress:  Not documented in this visit.                      User Key     Initials Effective Dates Name Provider Type Discipline     06/16/21 -  Penelope Blakely, OT Occupational Therapist OT              OT Recommendation and Plan  Retired Outcome Summary/Treatment Plan (OT)  Anticipated Discharge Disposition (OT): home with assist, home with home health  Therapy Frequency (OT): evaluation only  Plan of Care Review  Plan of Care Reviewed With: patient  Outcome Summary: OT IE completed. Pt is A&Ox4 and motivated to regain occupational independence. Min A bed mobility, no AE needed. CGA all in room mobility and transfers using RW. Education completed for home safety/fall prevention. Pt reports excellent pain control. Denies any bathroom DME needs. Requests RW for home. Appropriate AE issued and teaching completed. OT will d/c at this time. Anticipate pt's d/c to home today following PT. Recommend 24/7 supervision upon d/c.  Plan of Care Reviewed With: patient  Outcome Summary: OT IE completed. Pt is A&Ox4 and motivated to regain occupational independence. Min A bed mobility, no AE needed. CGA all in room mobility and transfers using RW. Education completed for home safety/fall prevention. Pt reports excellent pain control. Denies any bathroom DME needs. Requests RW for home. Appropriate AE issued and teaching  completed. OT will d/c at this time. Anticipate pt's d/c to home today following PT. Recommend 24/7 supervision upon d/c.     Time Calculation:    Time Calculation- OT     Row Name 11/10/21 0745             Time Calculation- OT    OT Start Time 0745  -TB      OT Received On 11/10/21  -TB              Timed Charges    33890 - OT Self Care/Mgmt Minutes 12  -TB              Untimed Charges    OT Eval/Re-eval Minutes 55  -TB              Total Minutes    Timed Charges Total Minutes 12  -TB      Untimed Charges Total Minutes 55  -TB       Total Minutes 67  -TB            User Key  (r) = Recorded By, (t) = Taken By, (c) = Cosigned By    Initials Name Provider Type    TB Penelope Blakely OT Occupational Therapist              Therapy Charges for Today     Code Description Service Date Service Provider Modifiers Qty    40332254972 HC OT SELF CARE/MGMT/TRAIN EA 15 MIN 11/10/2021 Penelope Blakely OT GO 1    32915654476 HC OT EVAL LOW COMPLEXITY 4 11/10/2021 Penelope Blakely OT GO 1               Penelope Blakely OT  11/10/2021

## 2021-11-10 NOTE — PLAN OF CARE
Problem: Adult Inpatient Plan of Care  Goal: Plan of Care Review  Recent Flowsheet Documentation  Taken 11/10/2021 1027 by Diana Houston, PT  Progress: improving  Plan of Care Reviewed With:   patient   son  Outcome Summary: Patient ambulated 350 feet with RW, CGA, step through gait pattern, limited by fatigue and pain. Patient climbed 1 step backwards with RW and CGA with no difficulty. ROM progressing well, 7-105 degrees. Patient has been d/c home with family and HHPT.   Goal Outcome Evaluation:  Plan of Care Reviewed With: patient, son        Progress: improving  Outcome Summary: Patient ambulated 350 feet with RW, CGA, step through gait pattern, limited by fatigue and pain. Patient climbed 1 step backwards with RW and CGA with no difficulty. ROM progressing well, 7-105 degrees. Patient has been d/c home with family and HHPT.

## 2021-11-11 NOTE — PROGRESS NOTES
[facility]    Nerve Cath Post Op Call    Patient Name: Raleigh Paredes  :  1934  MRN:  [id number]  Date of Discharge: 11/10/2021    Nerve Cath Post Op Call:    Catheter Plan:Patient called, No answer. Message left to call [facility] pain service for any questions or complaints and Patient/Family member instructed to remove the catheter during telephone contact  Patient/Family instructed to call ON CALL anesthesia provider for any questions or problems.  Patient Follow Up:

## 2021-11-12 NOTE — PROGRESS NOTES
[facility]    Nerve Cath Post Op Call    Patient Name: Raleigh Paredes  :  1934  MRN:  [id number]  Date of Discharge: 11/10/2021    Nerve Cath Post Op Call:    Catheter Plan:Patient/Family member report nerve catheter previously discontinued, tip intact  Patient/Family instructed to call ON CALL anesthesia provider for any questions or problems.  Patient Follow Up:

## 2021-11-29 ENCOUNTER — OFFICE VISIT (OUTPATIENT)
Dept: ORTHOPEDIC SURGERY | Facility: CLINIC | Age: 86
End: 2021-11-29

## 2021-11-29 VITALS — TEMPERATURE: 97.3 F

## 2021-11-29 DIAGNOSIS — Z96.652 S/P TOTAL KNEE ARTHROPLASTY, LEFT: Primary | ICD-10-CM

## 2021-11-29 PROCEDURE — 99024 POSTOP FOLLOW-UP VISIT: CPT | Performed by: PHYSICIAN ASSISTANT

## 2021-11-29 NOTE — PROGRESS NOTES
"    [facility] [place] [facility] [place]        Subjective     Post-op (3 weeks s/p Left Total Knee Arthroplasty 11/9/21)       HPI    Raleigh Paredes is a 87 y.o. male.  Patient presents for their initial postop visit following left TKA performed on the above date by Dr. Pizarro.    Pain scale: 3/10.  Patient is currently taking aspirin as directed for DVT prophylaxis.  He is using a cane to assist with ambulation.  He is working with outpatient PT ([facility]).  No reported numbness or tingling into the extremity.    States he is \"a lot better\" than what he was before surgery.    Patient denies any fever, chills, night sweats or other constitutional symptoms.        Objective      Physical Exam  Temp 97.3 °F (36.3 °C)     There is no height or weight on file to calculate BMI.        Ortho Exam  Integument:   Left knee: Incision is healing well without redness, warmth, drainage or evidence of infection.    Lower Extremities:   Left knee:    Tenderness:  Minimal incisional    Effusion:  Trace    Swelling: Mild, no tenderness to the calf.    Crepitus:  None    Range of motion:  Extension: 0°       Flexion: 100°    Instability:  No varus laxity, no valgus laxity, negative anterior drawer    Deformities:  None      Imaging Reviewed:  Ordered left knee plain films.  Imaging read/interpreted by Dr. Pizarro.    Imaging Results (Last 24 Hours)     Procedure Component Value Units Date/Time    XR Knee 3+ View With [place] Left [[id number]] Resulted: 11/29/21 0830     Updated: 11/29/21 0831    Narrative:      Left Knee Radiographs  Indication: status-post left total knee arthroplasty  Views: AP, lateral, and sunrise views of the left knee    Comparison: no change compared to prior study, 11/9/2021    Findings:   The components are well aligned, with no signs of loosening or failure.          Assessment:  1. S/P total knee arthroplasty, left        Plan:  1. Status post left TKA, stable.  2. Glue mesh dressing removed today.  3. Continue " with formal PT.  4. Recommend over-the-counter pain medication as needed.  5. Continue use of cane as needed.  6. Follow up in 6 weeks weeks for repeat evaluation, patient does not require imaging of the knee.  7. Questions and concerns answered.    Patient was also examined by Dr. Pizarro and he agrees with the above assessment and plan.      Isha Reaves PA-C  11/29/21  08:39 EST      Dictated Utilizing Dragon Dictation.

## 2022-01-10 ENCOUNTER — OFFICE VISIT (OUTPATIENT)
Dept: ORTHOPEDIC SURGERY | Facility: CLINIC | Age: 87
End: 2022-01-10

## 2022-01-10 DIAGNOSIS — Z96.652 S/P TOTAL KNEE ARTHROPLASTY, LEFT: Primary | ICD-10-CM

## 2022-01-10 DIAGNOSIS — Z47.89 ORTHOPEDIC AFTERCARE: ICD-10-CM

## 2022-01-10 PROCEDURE — 99024 POSTOP FOLLOW-UP VISIT: CPT | Performed by: ORTHOPAEDIC SURGERY

## 2022-01-10 NOTE — PROGRESS NOTES
[facility]opa[facility] Surgery Clinic Note    Subjective     Chief Complaint   Patient presents with   • Post-op     6 weeks follow up; 9 weeks follow up Total Knee Arthroplasty Left 11-9-21        HPI    It has been 6  week(s) since Mr. Paredes's last visit. He returns to clinic today for postoperative follow-up of left knee arthroplasty. The issue has been ongoing for 1 year(s). He rates his pain a 1/10 on the pain scale. Previous/current treatments: physical therapy. The pain is worse with end of the day if done alot of walking; resting improve the pain. Overall, he is doing better.  100% improvement compared to his preoperative symptoms.  He is pleased with results.    I have reviewed the following portions of the patient's history and agree with: History of Present Illness and Review of Systems    Patient Active Problem List   Diagnosis   • Hypertensive cardiovascular disease   • Palpitations   • Gout   • Moderate obesity   • Psoriasis   • Colon polyps   • Primary osteoarthritis of right knee   • HTN (hypertension)   • Status post total right knee replacement   • DM (diabetes mellitus) (CMS/HCC)- new dx   • Leukocytosis, mild, likely reactive   • Acute postoperative pain   • Parkinson's disease (HCC)   • Primary osteoarthritis of left knee   • Status post total left knee replacement     Past Medical History:   Diagnosis Date   • Arthritis     knees and hands    • Borderline diabetes    • Cataract     bilat - may going to have surgery    • Colon polyps 2/16/2017   • Diverticulitis     h/o   • Diverticulosis    • Gout 2/16/2017   • History of COVID-19 01/2021    Asymptomatic   • History of kidney stones     x2   • Hypertension    • Hypertensive cardiovascular disease 2/16/2017   • Moderate obesity 2/16/2017   • Palpitations 2/16/2017   • Psoriasis 2/16/2017   • Skin cancer     basal from left arm    • Tinnitus     left    • Wears glasses    • Wears partial dentures     lower       Past Surgical History:   Procedure  Laterality Date   • CARDIAC CATHETERIZATION     • CATARACT EXTRACTION EXTRACAPSULAR W/ INTRAOCULAR LENS IMPLANTATION Bilateral    • COLONOSCOPY     • CYST REMOVAL      Cyst removed from spine, .   • INGUINAL HERNIA REPAIR Bilateral    • KNEE CARTILAGE SURGERY Right 2004    Right knee removal of cartilage, , Dr. Dick.     • SKIN CANCER EXCISION      basal removed from left forear    • TONSILLECTOMY     • TOTAL KNEE ARTHROPLASTY Right 2018    Procedure: TOTAL KNEE ARTHROPLASTY RIGHT;  Surgeon: Ned Pizarro MD;  [facility];  Service: Orthopedics   • TOTAL KNEE ARTHROPLASTY Left 2021    Procedure: TOTAL KNEE ARTHROPLASTY LEFT;  Surgeon: Ned Pizarro MD;  [facility];  Service: Orthopedics;  Laterality: Left;   • WISDOM TOOTH EXTRACTION      all removed       Family History   Problem Relation Age of Onset   • Colon cancer Other    • Heart failure Mother    • Colon cancer Father    • Heart failure Father    • Cancer Sister    • Stroke Sister    • Heart attack Brother      Social History     Socioeconomic History   • Marital status:    Tobacco Use   • Smoking status: Former Smoker     Packs/day: 1.00     Years: 15.00     Pack years: 15.00     Types: Cigarettes     Quit date: 1965     Years since quittin.0   • Smokeless tobacco: Never Used   Vaping Use   • Vaping Use: Never used   Substance and Sexual Activity   • Alcohol use: No   • Drug use: No   • Sexual activity: Defer      Current Outpatient Medications on File Prior to Visit   Medication Sig Dispense Refill   • allopurinol (ZYLOPRIM) 300 MG tablet Take 300 mg by mouth Daily.     • carbidopa-levodopa (SINEMET)  MG per tablet Take 1 tablet by mouth 4 (Four) Times a Day. 360 tablet 1   • gabapentin (NEURONTIN) 300 MG capsule Take 300 mg by mouth Every Night.     • glipiZIDE (GLUCOTROL) 5 MG tablet Take 5 mg by mouth Daily.     • lisinopril-hydrochlorothiazide (PRINZIDE,ZESTORETIC) 20-12.5 MG  per tablet Take 1 tablet by mouth Daily.     • metoprolol tartrate (LOPRESSOR) 25 MG tablet Take 25 mg by mouth Daily.     • triamcinolone (KENALOG) 0.1 % cream      • [DISCONTINUED] oxyCODONE (Roxicodone) 5 MG immediate release tablet Take 1 tablet by mouth Every 4 (Four) Hours As Needed for Moderate Pain . 40 tablet 0     No current facility-administered medications on file prior to visit.      Allergies   Allergen Reactions   • Penicillins Itching and Swelling     Hand edema        Review of Systems   Constitutional: Negative.    HENT: Negative.    Eyes: Negative.    Respiratory: Negative.    Cardiovascular: Negative.    Gastrointestinal: Negative.    Endocrine: Negative.    Genitourinary: Negative.    Musculoskeletal: Positive for arthralgias.   Skin: Negative.    Allergic/Immunologic: Negative.    Neurological: Negative.    Hematological: Negative.    Psychiatric/Behavioral: Negative.         Objective      Physical Exam  There were no vitals taken for this visit.    There is no height or weight on file to calculate BMI.    General:   Mental Status:  Alert   Appearance: Cooperative, in no acute distress   Build and Nutrition: Well-nourished well-developed male   Orientation: Alert and oriented to person, place and time   Posture: Normal   Gait: Normal    Integument:   Left knee: Wound is well-healed with no signs of infection    Lower Extremities:   Left Knee:    Tenderness:  None    Effusion:  None    Swelling: None    Crepitus:  None    Range of motion:  Extension: 0°       Flexion: 120°  Instability:  No varus laxity, no valgus laxity, negative anterior drawer  Deformities:  None      Imaging/Studies  Imaging Results (Last 24 Hours)     ** No results found for the last 24 hours. **        No new imaging    Assessment and Plan     Diagnoses and all orders for this visit:    1. S/P total knee arthroplasty, left (Primary)    2. Orthopedic aftercare        1. S/P total knee arthroplasty, left    2. Orthopedic  aftercare        I reviewed my findings with patient.  His left total knee arthroplasty is functioning well, and he is pleased with results.  I will see him back on the anniversary of his replacement in November, but sooner for any problems.    Return in about 10 months (around 11/10/2022) for Recheck with X-Rays.      Ned Pizarro MD  01/10/22  09:15 EST

## 2022-03-14 ENCOUNTER — OFFICE VISIT (OUTPATIENT)
Dept: NEUROLOGY | Facility: CLINIC | Age: 87
End: 2022-03-14

## 2022-03-14 VITALS
OXYGEN SATURATION: 98 % | BODY MASS INDEX: 36.44 KG/M2 | WEIGHT: 198 LBS | TEMPERATURE: 97.4 F | DIASTOLIC BLOOD PRESSURE: 80 MMHG | HEIGHT: 62 IN | HEART RATE: 63 BPM | SYSTOLIC BLOOD PRESSURE: 126 MMHG

## 2022-03-14 DIAGNOSIS — G20 PARKINSON'S DISEASE: ICD-10-CM

## 2022-03-14 PROCEDURE — 99213 OFFICE O/P EST LOW 20 MIN: CPT | Performed by: NURSE PRACTITIONER

## 2022-03-14 RX ORDER — CARBIDOPA AND LEVODOPA 50; 200 MG/1; MG/1
1 TABLET, EXTENDED RELEASE ORAL 2 TIMES DAILY
Status: CANCELLED | OUTPATIENT
Start: 2022-03-14

## 2022-03-14 RX ORDER — GABAPENTIN 400 MG/1
400 CAPSULE ORAL NIGHTLY
COMMUNITY
Start: 2022-01-18

## 2022-03-14 NOTE — PROGRESS NOTES
Subjective:     Patient ID: Raleigh Paredes is a 87 y.o. male.    CC:   Chief Complaint   Patient presents with   • Parkinson's Disease       HPI:   History of Present Illness     Mr. Paredes is seen today for follow up.     I first saw him late 2020 ago for evaluation of tremor.  He told me he first noticed a tremor in his right arm about 2 years prior, tremor seemed  to be bothersome and that it keeps him from eating well and also he has noticed a significant problem with handwriting.  He felt like his words and letters are becoming much smaller.  He had also noticed a tremor of his lower jaw in the past year as well.  He was concerned as he felt this is progressing in intensity.    He has primary care put him on propranolol about a year prior, he had noticed no improvement in tremor with this medication.  He also takes gabapentin 300 mg a day once at night for peripheral neuropathy, this has not helped his tremor.  He also reported having trouble getting caught up in his feet at times but he denies shuffled gait.  He did complain of right shoulder pain although he reported that this pain has been present for many years, he had a rotator cuff injury that he did not have surgically repaired.  He denied any dysphasia or REM sleep disorder symptoms.  He denied loss of smell.  He feels a bit stiff overall.  He has constipation off and on, he at times has trouble controlling his bladder.  He denied any dizziness upon standing or orthostatic instability  No family history of tremor or Parkinson's disease.  He also denies hallucinations or problems with his memory.  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  He had been on gabapentin and propranolol for over a year with no improvement tremor.  We did discuss options and trialing  Sinemet which he wanted to do. He had significant improvement in his tremor with initiation of carbidopa levodopa..  He did have DaTscan at  which showed findings consistent with Parkinson's disease.       He is currently taking carbidopa levodopa 25/100 4 times a day, at last visit he told me that this seemed to control his tremor and kept him functional.     Today he tells me his tremor seem to be a bit more pronounced in his right upper extremity and he seems to be having more stiffness overall, he would like to try to tweak his medications just a bit.  He is having no side effects from the medicine  The following portions of the patient's history were reviewed and updated as appropriate: allergies, current medications, past family history, past medical history, past social history, past surgical history and problem list.    Past Medical History:   Diagnosis Date   • Arthritis     knees and hands    • Borderline diabetes    • Cataract     bilat - may going to have surgery    • Colon polyps 2/16/2017   • Diverticulitis     h/o   • Diverticulosis    • Gout 2/16/2017   • History of COVID-19 01/2021    Asymptomatic   • History of kidney stones     x2   • Hypertension    • Hypertensive cardiovascular disease 2/16/2017   • Moderate obesity 2/16/2017   • Palpitations 2/16/2017   • Psoriasis 2/16/2017   • Skin cancer     basal from left arm    • Tinnitus     left    • Wears glasses    • Wears partial dentures     lower        Past Surgical History:   Procedure Laterality Date   • CARDIAC CATHETERIZATION  2000   • CATARACT EXTRACTION EXTRACAPSULAR W/ INTRAOCULAR LENS IMPLANTATION Bilateral    • COLONOSCOPY     • CYST REMOVAL  1978    Cyst removed from spine, 1978.   • INGUINAL HERNIA REPAIR Bilateral 2009   • KNEE CARTILAGE SURGERY Right 2004    Right knee removal of cartilage, 2004, Dr. Dick.     • SKIN CANCER EXCISION      basal removed from left forear    • TONSILLECTOMY     • TOTAL KNEE ARTHROPLASTY Right  "2018    Procedure: TOTAL KNEE ARTHROPLASTY RIGHT;  Surgeon: Ned Pizarro MD;  Location: [facility] OR;  Service: Orthopedics   • TOTAL KNEE ARTHROPLASTY Left 2021    Procedure: TOTAL KNEE ARTHROPLASTY LEFT;  Surgeon: Ned Pizarro MD;  Location: [facility] OR;  Service: Orthopedics;  Laterality: Left;   • WISDOM TOOTH EXTRACTION      all removed        Social History     Socioeconomic History   • Marital status:    Tobacco Use   • Smoking status: Former Smoker     Packs/day: 1.00     Years: 15.00     Pack years: 15.00     Types: Cigarettes     Quit date: 1965     Years since quittin.2   • Smokeless tobacco: Never Used   Vaping Use   • Vaping Use: Never used   Substance and Sexual Activity   • Alcohol use: No   • Drug use: No   • Sexual activity: Defer       Family History   Problem Relation Age of Onset   • Colon cancer Other    • Heart failure Mother    • Colon cancer Father    • Heart failure Father    • Cancer Sister    • Stroke Sister    • Heart attack Brother         Review of Systems   Constitutional: Negative.    HENT: Negative.    Eyes: Negative.    Respiratory: Negative.    Cardiovascular: Negative.    Gastrointestinal: Negative.    Endocrine: Negative.    Genitourinary: Negative.    Musculoskeletal: Positive for gait problem.   Skin: Negative.    Allergic/Immunologic: Negative.    Neurological: Positive for tremors. Negative for dizziness, seizures, syncope, facial asymmetry, speech difficulty, weakness, light-headedness, numbness and headaches.   Hematological: Negative.    Psychiatric/Behavioral: Negative.         Objective:  /80   Pulse 63   Temp 97.4 °F (36.3 °C)   Ht 157.5 cm (62\")   Wt 89.8 kg (198 lb)   SpO2 98%   BMI 36.21 kg/m²     Neurologic Exam     Mental Status   Oriented to person, place, and time.   Attention: normal. Concentration: normal.   Speech: speech is normal   Level of consciousness: alert  Knowledge: consistent with education.   Able to read. " Able to write. Normal comprehension.     Cranial Nerves   Cranial nerves II through XII intact.     CN III, IV, VI   Pupils are equal, round, and reactive to light.  Right pupil: Shape: regular. Reactivity: brisk. Consensual response: intact. Accommodation: intact.   Left pupil: Shape: regular. Reactivity: brisk. Consensual response: intact. Accommodation: intact.   CN III: no CN III palsy  CN VI: no CN VI palsy  Nystagmus: none   Upgaze: normal  Downgaze: normal    CN V   Facial sensation intact.     CN VII   Facial expression full, symmetric.     CN VIII   CN VIII normal.     CN IX, X   CN IX normal.   CN X normal.     CN XI   CN XI normal.     CN XII   CN XII normal.     Motor Exam   Muscle bulk: normal  Right arm pronator drift: absent  Left arm pronator drift: absent    Strength   Strength 5/5 throughout. Mild rigidity right upper extremity  Slight bradykinesia with finger tap     Sensory Exam   Light touch normal.     Gait, Coordination, and Reflexes     Gait  Gait: normal    Coordination   Finger-nose-finger test: Tremor noted.His strides are much improved, he continues to have decreased arm swing right upper extremity, mild forward posture  Decreased DTRs lower extremities  Resting tremor right upper extremity       Physical Exam  Vitals reviewed.   Constitutional:       General: He is not in acute distress.     Appearance: He is well-developed. He is not ill-appearing or toxic-appearing.   HENT:      Head: Normocephalic and atraumatic.      Mouth/Throat:      Mouth: Mucous membranes are moist.   Eyes:      General: No scleral icterus.     Extraocular Movements: Extraocular movements intact.      Conjunctiva/sclera: Conjunctivae normal.      Pupils: Pupils are equal, round, and reactive to light.   Pulmonary:      Effort: Pulmonary effort is normal. No respiratory distress.   Musculoskeletal:      Cervical back: Normal range of motion and neck supple.   Skin:     General: Skin is warm.      Capillary Refill:  Capillary refill takes less than 2 seconds.   Neurological:      Mental Status: He is alert and oriented to person, place, and time.      Coordination: Finger-nose-finger test: Tremor noted.      Gait: Gait is intact.      Deep Tendon Reflexes: Strength normal.   Psychiatric:         Mood and Affect: Mood normal.         Speech: Speech normal.         Behavior: Behavior normal.         Thought Content: Thought content normal.         Judgment: Judgment normal.         Assessment/Plan:       Diagnoses and all orders for this visit:    1. Parkinson's disease (HCC)  -     carbidopa-levodopa (SINEMET)  MG per tablet; Take 1 and a half pills TID-QID  Dispense: 360 tablet; Refill: 1        I am going to increase his carbidopa just a bit, he is going to try 1-1/2 pills 3-4 times a day for now, we can increase further if needed or add an additional medication.  We will see him back in about 3 months but I encouraged him to reach out with any problems or concerns prior to follow-up appointment  We did discuss possible side effects of increased dosage including dyskinesias or GI upset, he is to contact me with any problems or concerns         Reviewed medications, potential side effects and signs and symptoms to report. Discussed risk versus benefits of treatment plan with patient and/or family-including medications, labs and radiology that may be ordered. Addressed questions and concerns during visit. Patient and/or family verbalized understanding and agree with plan.    AS THE PROVIDER, I PERSONALLY WORE PPE DURING ENTIRE FACE TO FACE ENCOUNTER IN CLINIC WITH THE PATIENT. PATIENT ALSO WORE PPE DURING ENTIRE FACE TO FACE ENCOUNTER EXCEPT FOR A MAX OF 30 SECONDS DURING NEUROLOGICAL EVALUATION OF CRANIAL NERVES AND THEN MASK WAS PLACED BACK OVER PATIENT FACE FOR REMAINDER OF VISIT. I WASHED MY HANDS BEFORE AND AFTER VISIT.    During this visit the following were done:  Labs Reviewed []    Labs Ordered []    Radiology  Reports Reviewed []    Radiology Ordered []    PCP Records Reviewed []    Referring Provider Records Reviewed []    ER Records Reviewed []    Hospital Records Reviewed []    History Obtained From Family []    Radiology Images Reviewed []    Other Reviewed []    Records Requested []      Jemima Valdez, APRN  3/14/2022

## 2022-05-31 ENCOUNTER — OFFICE VISIT (OUTPATIENT)
Dept: NEUROLOGY | Facility: CLINIC | Age: 87
End: 2022-05-31

## 2022-05-31 VITALS
WEIGHT: 191 LBS | BODY MASS INDEX: 35.15 KG/M2 | OXYGEN SATURATION: 97 % | HEART RATE: 66 BPM | TEMPERATURE: 96.6 F | SYSTOLIC BLOOD PRESSURE: 128 MMHG | HEIGHT: 62 IN | DIASTOLIC BLOOD PRESSURE: 60 MMHG

## 2022-05-31 DIAGNOSIS — G20 PARKINSON'S DISEASE: ICD-10-CM

## 2022-05-31 PROCEDURE — 99213 OFFICE O/P EST LOW 20 MIN: CPT | Performed by: NURSE PRACTITIONER

## 2022-05-31 NOTE — PROGRESS NOTES
Subjective:     Patient ID: Raleigh Paredes is a 87 y.o. male.    CC:   Chief Complaint   Patient presents with   • Parkinson's Disease       HPI:   History of Present Illness     Mr. Paredes is seen today for follow up.     I first saw him late 2020 ago for evaluation of tremor.  He told me he first noticed a tremor in his right arm about 2 years prior, tremor seemed  to be bothersome and that it keeps him from eating well and also he has noticed a significant problem with handwriting.  He felt like his words and letters are becoming much smaller.  He had also noticed a tremor of his lower jaw in the past year as well.  He was concerned as he felt this is progressing in intensity.    He has primary care put him on propranolol about a year prior, he had noticed no improvement in tremor with this medication.  He also takes gabapentin 300 mg a day once at night for peripheral neuropathy, this has not helped his tremor.  He also reported having trouble getting caught up in his feet at times but he denies shuffled gait.  He did complain of right shoulder pain although he reported that this pain has been present for many years, he had a rotator cuff injury that he did not have surgically repaired.  He denied any dysphasia or REM sleep disorder symptoms.  He denied loss of smell.  He feels a bit stiff overall.  He has constipation off and on, he at times has trouble controlling his bladder.  He denied any dizziness upon standing or orthostatic instability  No family history of tremor or Parkinson's disease.  He also denies hallucinations or problems with his memory.  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  He had been on gabapentin and propranolol for over a year with no improvement tremor.  We did discuss options and trialing  "Sinemet which he wanted to do. He had significant improvement in his tremor with initiation of carbidopa levodopa..  He did have DaTscan at  which showed findings consistent with Parkinson's disease.       He is currently taking carbidopa levodopa 25/100 1-1/2 pills 4 times a day,  dosage was increased just a bit at last visit due to her some rigidity.  He tells me today this is helped him significantly, he feels like he is getting around better, he still has tremor but it is overall improved as well.  He adamantly denies dysphagia, dizziness or any orthostasis.  He has had a couple falls but these are due to \"being careless\", denies problems with balance or falls due to gait.  Declines physical therapy again today      The following portions of the patient's history were reviewed and updated as appropriate: allergies, current medications, past family history, past medical history, past social history, past surgical history and problem list.    Past Medical History:   Diagnosis Date   • Arthritis     knees and hands    • Borderline diabetes    • Cataract     bilat - may going to have surgery    • Colon polyps 2/16/2017   • Diverticulitis     h/o   • Diverticulosis    • Gout 2/16/2017   • History of COVID-19 01/2021    Asymptomatic   • History of kidney stones     x2   • Hypertension    • Hypertensive cardiovascular disease 2/16/2017   • Moderate obesity 2/16/2017   • Palpitations 2/16/2017   • Psoriasis 2/16/2017   • Skin cancer     basal from left arm    • Tinnitus     left    • Wears glasses    • Wears partial dentures     lower        Past Surgical History:   Procedure Laterality Date   • CARDIAC CATHETERIZATION  2000   • CATARACT EXTRACTION EXTRACAPSULAR W/ INTRAOCULAR LENS IMPLANTATION Bilateral    • COLONOSCOPY     • CYST REMOVAL  1978    Cyst removed from spine, 1978.   • INGUINAL HERNIA REPAIR Bilateral 2009   • KNEE CARTILAGE SURGERY Right 2004    Right knee removal of cartilage, 2004, Dr. Dick.     • " "SKIN CANCER EXCISION      basal removed from left forear    • TONSILLECTOMY     • TOTAL KNEE ARTHROPLASTY Right 2018    Procedure: TOTAL KNEE ARTHROPLASTY RIGHT;  Surgeon: Ned Pizarro MD;  Location: [facility] OR;  Service: Orthopedics   • TOTAL KNEE ARTHROPLASTY Left 2021    Procedure: TOTAL KNEE ARTHROPLASTY LEFT;  Surgeon: Ned Pizarro MD;  [facility];  Service: Orthopedics;  Laterality: Left;   • WISDOM TOOTH EXTRACTION      all removed        Social History     Socioeconomic History   • Marital status:    Tobacco Use   • Smoking status: Former Smoker     Packs/day: 1.00     Years: 15.00     Pack years: 15.00     Types: Cigarettes     Quit date: 1965     Years since quittin.4   • Smokeless tobacco: Never Used   Vaping Use   • Vaping Use: Never used   Substance and Sexual Activity   • Alcohol use: No   • Drug use: No   • Sexual activity: Defer       Family History   Problem Relation Age of Onset   • Colon cancer Other    • Heart failure Mother    • Colon cancer Father    • Heart failure Father    • Cancer Sister    • Stroke Sister    • Heart attack Brother         Review of Systems   Constitutional: Negative.    HENT: Negative.    Eyes: Negative.    Respiratory: Negative.    Cardiovascular: Negative.    Gastrointestinal: Negative.    Endocrine: Negative.    Genitourinary: Negative.    Musculoskeletal: Negative.    Skin: Negative.    Allergic/Immunologic: Negative.    Neurological: Positive for tremors. Negative for dizziness, seizures, syncope, facial asymmetry, speech difficulty, weakness, light-headedness, numbness and headaches.   Hematological: Negative.    Psychiatric/Behavioral: Negative.         Objective:  /60   Pulse 66   Temp 96.6 °F (35.9 °C)   Ht 157.5 cm (62\")   Wt 86.6 kg (191 lb)   SpO2 97%   BMI 34.93 kg/m²     Neurologic Exam     Mental Status   Oriented to person, place, and time.   Attention: normal. Concentration: normal.   Speech: speech is " normal   Level of consciousness: alert  Knowledge: consistent with education.   Able to read. Able to write. Normal comprehension.     Cranial Nerves   Cranial nerves II through XII intact.     CN III, IV, VI   Pupils are equal, round, and reactive to light.  Right pupil: Shape: regular. Reactivity: brisk. Consensual response: intact. Accommodation: intact.   Left pupil: Shape: regular. Reactivity: brisk. Consensual response: intact. Accommodation: intact.   CN III: no CN III palsy  CN VI: no CN VI palsy  Nystagmus: none   Upgaze: normal  Downgaze: normal    CN V   Facial sensation intact.     CN VII   Facial expression full, symmetric.     CN VIII   CN VIII normal.     CN IX, X   CN IX normal.   CN X normal.     CN XI   CN XI normal.     CN XII   CN XII normal.     Motor Exam   Muscle bulk: normal  Overall muscle tone: normal  Right arm pronator drift: absent  Left arm pronator drift: absent    Strength   Strength 5/5 throughout. Mild rigidity right upper extremity  Mild bradykinesia with finger tap     Sensory Exam   Light touch normal.     Gait, Coordination, and Reflexes     Gait  Gait: (Full strides, decreased arm swing on the right with ambulation but overall improved significantly since starting medication)    Coordination   Romberg: negative  Finger to nose coordination: normalDecreased DTRs  Mild resting tremor right upper extremity       Physical Exam  Vitals reviewed.   Constitutional:       General: He is not in acute distress.     Appearance: He is well-developed. He is not ill-appearing or toxic-appearing.   HENT:      Head: Normocephalic and atraumatic.      Mouth/Throat:      Mouth: Mucous membranes are moist.   Eyes:      General: No scleral icterus.     Extraocular Movements: Extraocular movements intact.      Conjunctiva/sclera: Conjunctivae normal.      Pupils: Pupils are equal, round, and reactive to light.   Pulmonary:      Effort: Pulmonary effort is normal. No respiratory distress.    Musculoskeletal:      Cervical back: Normal range of motion and neck supple.   Skin:     General: Skin is warm.      Capillary Refill: Capillary refill takes less than 2 seconds.   Neurological:      Mental Status: He is alert and oriented to person, place, and time.      Coordination: Finger-Nose-Finger Test and Romberg Test normal.      Deep Tendon Reflexes: Strength normal.   Psychiatric:         Mood and Affect: Mood normal.         Speech: Speech normal.         Behavior: Behavior normal.         Thought Content: Thought content normal.         Judgment: Judgment normal.         Assessment/Plan:       Diagnoses and all orders for this visit:    1. Parkinson's disease (HCC)  -     Discontinue: carbidopa-levodopa (SINEMET)  MG per tablet; Take 1 and a half pills TID-QID  Dispense: 360 tablet; Refill: 1  -     carbidopa-levodopa (SINEMET)  MG per tablet; Take 1 and a half pills TID-QID  Dispense: 360 tablet; Refill: 1    Continue current dose of carbidopa levodopa, patient reports she is getting along very well with no issues.  He would like to follow-up in 6 months, he is encouraged to reach out to our clinic with any problems or concerns prior to follow-up appointment  As physical therapy, discussed fall risk         Reviewed medications, potential side effects and signs and symptoms to report. Discussed risk versus benefits of treatment plan with patient and/or family-including medications, labs and radiology that may be ordered. Addressed questions and concerns during visit. Patient and/or family verbalized understanding and agree with plan.    AS THE PROVIDER, I PERSONALLY WORE PPE DURING ENTIRE FACE TO FACE ENCOUNTER IN CLINIC WITH THE PATIENT. PATIENT ALSO WORE PPE DURING ENTIRE FACE TO FACE ENCOUNTER EXCEPT FOR A MAX OF 30 SECONDS DURING NEUROLOGICAL EVALUATION OF CRANIAL NERVES AND THEN MASK WAS PLACED BACK OVER PATIENT FACE FOR REMAINDER OF VISIT. I WASHED MY HANDS BEFORE AND AFTER  VISIT.    During this visit the following were done:  Labs Reviewed []    Labs Ordered []    Radiology Reports Reviewed []    Radiology Ordered []    PCP Records Reviewed []    Referring Provider Records Reviewed []    ER Records Reviewed []    Hospital Records Reviewed []    History Obtained From Family []    Radiology Images Reviewed []    Other Reviewed []    Records Requested []      Jemima Valdez, APRN  5/31/2022

## 2022-10-24 DIAGNOSIS — G20 PARKINSON'S DISEASE: ICD-10-CM

## 2022-10-24 NOTE — TELEPHONE ENCOUNTER
Caller: JANA HACKETT  Relationship: DAUGHTER  Best call back number: [phone]    Requested Prescriptions:   Requested Prescriptions     Pending Prescriptions Disp Refills   • carbidopa-levodopa (SINEMET)  MG per tablet 360 tablet 1     Sig: Take 1 and a half pills TID-QID        Pharmacy where request should be sent: [facility] - [facility] [address] - [phone]  - [phone] FX     Additional details provided by patient: PT HAS TWO WEEKS LEFT - MAIL ORDER CAN TAKE UP TO 14 DAYS    Does the patient have less than a 3 day supply:  [] Yes  [x] No    Nitza Espinoza Rep   10/24/22 09:55 EDT

## 2022-10-24 NOTE — TELEPHONE ENCOUNTER
Rx Refill Note  Requested Prescriptions     Pending Prescriptions Disp Refills   • carbidopa-levodopa (SINEMET)  MG per tablet 360 tablet 1     Sig: Take 1 and a half pills TID-QID

## 2022-11-14 ENCOUNTER — OFFICE VISIT (OUTPATIENT)
Dept: ORTHOPEDIC SURGERY | Facility: CLINIC | Age: 87
End: 2022-11-14

## 2022-11-14 VITALS
SYSTOLIC BLOOD PRESSURE: 120 MMHG | HEIGHT: 62 IN | WEIGHT: 194 LBS | BODY MASS INDEX: 35.7 KG/M2 | DIASTOLIC BLOOD PRESSURE: 62 MMHG

## 2022-11-14 DIAGNOSIS — Z96.652 S/P TOTAL KNEE ARTHROPLASTY, LEFT: Primary | ICD-10-CM

## 2022-11-14 DIAGNOSIS — Z09 POSTOPERATIVE EXAMINATION: ICD-10-CM

## 2022-11-14 PROCEDURE — 99212 OFFICE O/P EST SF 10 MIN: CPT | Performed by: ORTHOPAEDIC SURGERY

## 2022-11-14 NOTE — PROGRESS NOTES
[facility]opa[facility] Clinic Note    Subjective     Chief Complaint   Patient presents with   • Follow-up     10 month follow up -- 1 year s/p Total Knee Arthroplasty Left 11-9-21        HPI    It has been 10  month(s) since Mr. Paredes's last visit. He returns to clinic today for postoperative follow-up of left knee arthroplasty. The issue has been ongoing for 1 year(s). He rates his pain a 2/10 on the pain scale. Previous/current treatments: nothing. Current symptoms: pain. The pain is worse with bending down low ; resting improve the pain. Overall, he is doing better.  99% improvement compared to his preoperative symptoms.  Fully ambulatory without external aids.    I have reviewed the following portions of the patient's history and agree with: History of Present Illness and Review of Systems    Patient Active Problem List   Diagnosis   • Hypertensive cardiovascular disease   • Palpitations   • Gout   • Moderate obesity   • Psoriasis   • Colon polyps   • Primary osteoarthritis of right knee   • HTN (hypertension)   • Status post total right knee replacement   • DM (diabetes mellitus) (CMS/HCC)- new dx   • Leukocytosis, mild, likely reactive   • Acute postoperative pain   • Parkinson's disease (HCC)   • Primary osteoarthritis of left knee   • Status post total left knee replacement     Past Medical History:   Diagnosis Date   • Arthritis     knees and hands    • Borderline diabetes    • Cataract     bilat - may going to have surgery    • Colon polyps 02/16/2017   • Diverticulitis     h/o   • Diverticulosis    • Gout 02/16/2017   • History of COVID-19 01/2021    Asymptomatic   • History of kidney stones     x2   • Hypertension    • Hypertensive cardiovascular disease 02/16/2017   • Moderate obesity 02/16/2017   • Palpitations 02/16/2017   • Psoriasis 02/16/2017   • Skin cancer     basal from left arm    • Tinnitus     left    • Wears glasses    • Wears partial dentures     lower       Past Surgical History:    Procedure Laterality Date   • CARDIAC CATHETERIZATION     • CATARACT EXTRACTION EXTRACAPSULAR W/ INTRAOCULAR LENS IMPLANTATION Bilateral    • COLONOSCOPY     • CYST REMOVAL      Cyst removed from spine, .   • INGUINAL HERNIA REPAIR Bilateral    • KNEE CARTILAGE SURGERY Right     Right knee removal of cartilage, , Dr. Dick.     • SHOULDER SURGERY      Not sure of the date   • SKIN CANCER EXCISION      basal removed from left forear    • TONSILLECTOMY     • TOTAL KNEE ARTHROPLASTY Right 2018    Procedure: TOTAL KNEE ARTHROPLASTY RIGHT;  Surgeon: Ned Pizarro MD;  Location: [facility] OR;  Service: Orthopedics   • TOTAL KNEE ARTHROPLASTY Left 2021    Procedure: TOTAL KNEE ARTHROPLASTY LEFT;  Surgeon: Ned Pizarro MD;  [facility];  Service: Orthopedics;  Laterality: Left;   • WISDOM TOOTH EXTRACTION      all removed       Family History   Problem Relation Age of Onset   • Colon cancer Other    • Heart failure Mother    • Colon cancer Father    • Heart failure Father    • Cancer Sister    • Stroke Sister    • Heart attack Brother      Social History     Socioeconomic History   • Marital status:    Tobacco Use   • Smoking status: Former     Packs/day: 1.00     Years: 15.00     Pack years: 15.00     Types: Cigarettes     Quit date: 1965     Years since quittin.9   • Smokeless tobacco: Never   Vaping Use   • Vaping Use: Never used   Substance and Sexual Activity   • Alcohol use: No   • Drug use: No   • Sexual activity: Not Currently     Partners: Female      Current Outpatient Medications on File Prior to Visit   Medication Sig Dispense Refill   • allopurinol (ZYLOPRIM) 300 MG tablet Take 300 mg by mouth Daily.     • carbidopa-levodopa (SINEMET)  MG per tablet Take 1 and a half pills TID- tablet 3   • gabapentin (NEURONTIN) 400 MG capsule Take 400 mg by mouth Every Night.     • glipiZIDE (GLUCOTROL) 5 MG tablet Take 5 mg by mouth Daily.     •  lisinopril-hydrochlorothiazide (PRINZIDE,ZESTORETIC) 20-12.5 MG per tablet Take 1 tablet by mouth Daily.     • metoprolol tartrate (LOPRESSOR) 25 MG tablet Take 25 mg by mouth Daily.     • triamcinolone (KENALOG) 0.1 % cream        No current facility-administered medications on file prior to visit.      Allergies   Allergen Reactions   • Penicillins Itching and Swelling     Hand edema        Review of Systems   Constitutional: Negative for activity change, appetite change, chills, diaphoresis, fatigue, fever and unexpected weight change.   HENT: Negative for congestion, dental problem, drooling, ear discharge, ear pain, facial swelling, hearing loss, mouth sores, nosebleeds, postnasal drip, rhinorrhea, sinus pressure, sneezing, sore throat, tinnitus, trouble swallowing and voice change.    Eyes: Negative for photophobia, pain, discharge, redness, itching and visual disturbance.   Respiratory: Negative for apnea, cough, choking, chest tightness, shortness of breath, wheezing and stridor.    Cardiovascular: Negative for chest pain, palpitations and leg swelling.   Gastrointestinal: Negative for abdominal distention, abdominal pain, anal bleeding, blood in stool, constipation, diarrhea, nausea, rectal pain and vomiting.   Endocrine: Negative for cold intolerance, heat intolerance, polydipsia, polyphagia and polyuria.   Genitourinary: Negative for decreased urine volume, difficulty urinating, dysuria, enuresis, flank pain, frequency, genital sores, hematuria and urgency.   Musculoskeletal: Positive for arthralgias. Negative for back pain, gait problem, joint swelling, myalgias, neck pain and neck stiffness.   Skin: Negative for color change, pallor, rash and wound.   Allergic/Immunologic: Negative for environmental allergies, food allergies and immunocompromised state.   Neurological: Negative for dizziness, tremors, seizures, syncope, facial asymmetry, speech difficulty, weakness, light-headedness, numbness and  "headaches.   Hematological: Negative for adenopathy. Does not bruise/bleed easily.   Psychiatric/Behavioral: Negative for agitation, behavioral problems, confusion, decreased concentration, dysphoric mood, hallucinations, self-injury, sleep disturbance and suicidal ideas. The patient is not nervous/anxious and is not hyperactive.         Objective      Physical Exam  /62   Ht 157.5 cm (62.01\")   Wt 88 kg (194 lb)   BMI 35.47 kg/m²     Body mass index is 35.47 kg/m².    General:   Mental Status:  Alert   Appearance: Cooperative, in no acute distress   Build and Nutrition: Well-nourished well-developed male   Orientation: Alert and oriented to person, place and time   Posture: Normal   Gait: Normal    Integument:              Left knee: Wound is well-healed with no signs of infection     Lower Extremities:              Left Knee:                          Tenderness:    None                          Effusion:          None                          Swelling:          None                          Crepitus:          None                          Range of motion:        Extension:       0°                                                              Flexion:           125°  Instability:        No varus laxity, no valgus laxity, negative anterior drawer  Deformities:     None    Imaging/Studies  Imaging Results (Last 24 Hours)     Procedure Component Value Units Date/Time    XR Knee 3+ View With [place] Left [[id number]] Resulted: 11/14/22 0914     Updated: 11/14/22 0914    Narrative:      Left Knee Radiographs  Indication: status-post left total knee arthroplasty  Views: AP, lateral, and sunrise views of the left knee    Comparison: no change compared to prior study, 11/29/2021    Findings:   The components are well aligned, with no signs of loosening or failure.            Assessment and Plan     Diagnoses and all orders for this visit:    1. S/P total knee arthroplasty, left (Primary)  -     XR Knee 3+ View With " Sunrise Left    2. Postoperative examination        1. S/P total knee arthroplasty, left    2. Postoperative examination        I reviewed my findings with the patient.  His left total knee arthroplasty is functioning well, and he is pleased with results.  I will see him back in 2 years for recheck on both of his knees, but sooner for any problems.  Of note, his right total knee arthroplasty continues to do well symptomatically.    Return in about 2 years (around 11/14/2024) for Recheck with X-Rays.      Ned Pizarro MD  11/14/22  09:21 EST

## 2023-02-13 ENCOUNTER — OFFICE VISIT (OUTPATIENT)
Dept: NEUROLOGY | Facility: CLINIC | Age: 88
End: 2023-02-13
Payer: MEDICARE

## 2023-02-13 VITALS
OXYGEN SATURATION: 97 % | DIASTOLIC BLOOD PRESSURE: 70 MMHG | WEIGHT: 200 LBS | HEIGHT: 62 IN | HEART RATE: 62 BPM | BODY MASS INDEX: 36.8 KG/M2 | SYSTOLIC BLOOD PRESSURE: 122 MMHG

## 2023-02-13 DIAGNOSIS — G20 PARKINSON'S DISEASE: ICD-10-CM

## 2023-02-13 PROBLEM — I10 BENIGN ESSENTIAL HYPERTENSION: Status: ACTIVE | Noted: 2023-02-13

## 2023-02-13 PROBLEM — R97.20 ELEVATED PSA: Status: ACTIVE | Noted: 2023-02-13

## 2023-02-13 PROBLEM — M79.2 PERIPHERAL NEUROPATHIC PAIN: Status: ACTIVE | Noted: 2023-02-13

## 2023-02-13 PROBLEM — E78.1 HYPERTRIGLYCERIDEMIA: Status: ACTIVE | Noted: 2023-02-13

## 2023-02-13 PROCEDURE — 99214 OFFICE O/P EST MOD 30 MIN: CPT | Performed by: NURSE PRACTITIONER

## 2023-02-13 NOTE — PROGRESS NOTES
Subjective:     Patient ID: Raleigh Paredes is a 88 y.o. male.    CC:   Chief Complaint   Patient presents with   • Parkinson's Disease       HPI:   History of Present Illness   Today 2/13/2023- This is a very pleasant 88-year-old male who presents for 9-month neurology follow-up on Parkinson's disease. He was previously under the care of CHARLY Telles, last seen in clinic on 05/31/2022. He is accompanied by his daughter during today's visit. He has had a tremor pill rolling and resting also with ambulation since 2020 in his right arm. Two years prior, he noticed some trouble with his writing and some micrographia.     He did undergo a DaTScan on 11/19/2020 at the [facility], which showed an abnormal pattern consistent with Parkinson's disease with the bilateral basal ganglia being affected. He is currently taking carbidopa-levodopa  mg 1.5 pills 3 to 4 times per day.     He is here for a follow-up and refills on medications today. He is accompanied by his daughter.    Today, he reports he takes carbidopa-levodopa 1.5 pills 4 times per day. He states that he takes his medication when he eats and when he goes to bed; however, he does occasionally miss a dose. His daughter states that he is pretty routine. He takes his medication around 7:30 AM, 12:00 PM,  6:00 PM, and 9:00 PM when he goes to bed. His tremor is mostly in his right hand and he also has a tremor in his mouth. He denies any trouble with swallowing or loss of smell. He states that he has significant constipation; however, it is resolves easily with Dulcolax as needed. He states that he occasionally drools on his pillow at night and he will occasionally drool when he is talking.    He states he has fallen often in the past 12 months; however, he denies falling daily. He denies any injuries; however, he did need stitches on his eyebrow over 1 year ago after hitting his face when he fell. He reports that being the last time he  had a fall with injury. He denies getting dizzy. He states that he does not have to use a cane or a walker. He did physical therapy when he had his knee replaced. He denies ever doing physical therapy for Parkinson's. His daughter states that he has not fallen since the Summer. He tends to fall more in the summer due to being on uneven ground when he is tending to his garden or mowing.     He does have neuropathy. He denies having information about [facility]. He states that he drives. He denies shuffling. He states he is right-handed; however, he has to eat with his left hand. He states that he tries to take his medicine at the same time every day. The patient denies noticing increased symptoms when it is time for his next medication dose. He continues to drive. He denies any issues with doorways or being in tight quarters.    He states that he forgets what he ate for lunch; however, his daughter does not believe the patient has significant memory loss. He denies any confusion, being lost, nightmares, bad dreams, or scary dreams. He denies any hallucinations.     The following portions of the patient's history were reviewed and updated as appropriate: allergies, current medications, past family history, past medical history, past social history, past surgical history and problem list.    Past Medical History:   Diagnosis Date   • Arthritis     knees and hands    • Borderline diabetes    • Cataract     bilat - may going to have surgery    • Colon polyps 02/16/2017   • Diabetes mellitus (HCC)    • Diverticulitis     h/o   • Diverticulosis    • Gout 02/16/2017   • History of COVID-19 01/2021    Asymptomatic   • History of kidney stones     x2   • HL (hearing loss) 2018    Gradually worsen   • Hyperlipidemia    • Hypertension    • Hypertensive cardiovascular disease 02/16/2017   • Moderate obesity 02/16/2017   • Numbness and tingling    • Palpitations 02/16/2017   • Psoriasis 02/16/2017   • Skin cancer      basal from left arm    • Tinnitus     left    • Wears glasses    • Wears partial dentures     lower        Past Surgical History:   Procedure Laterality Date   • CARDIAC CATHETERIZATION     • CATARACT EXTRACTION EXTRACAPSULAR W/ INTRAOCULAR LENS IMPLANTATION Bilateral    • COLONOSCOPY     • CYST REMOVAL      Cyst removed from spine, .   • INGUINAL HERNIA REPAIR Bilateral    • KNEE CARTILAGE SURGERY Right 2004    Right knee removal of cartilage, , Dr. Dick.     • SHOULDER SURGERY      Not sure of the date   • SKIN CANCER EXCISION      basal removed from left forear    • TONSILLECTOMY     • TOTAL KNEE ARTHROPLASTY Right 2018    Procedure: TOTAL KNEE ARTHROPLASTY RIGHT;  Surgeon: Ned Pizarro MD;  [facility];  Service: Orthopedics   • TOTAL KNEE ARTHROPLASTY Left 2021    Procedure: TOTAL KNEE ARTHROPLASTY LEFT;  Surgeon: Ned Pizarro MD;  [facility]  [facility];  Service: Orthopedics;  Laterality: Left;   • WISDOM TOOTH EXTRACTION      all removed        Social History     Socioeconomic History   • Marital status:    Tobacco Use   • Smoking status: Former     Packs/day: 1.00     Years: 15.00     Pack years: 15.00     Types: Cigarettes     Quit date: 1965     Years since quittin.1   • Smokeless tobacco: Never   Vaping Use   • Vaping Use: Never used   Substance and Sexual Activity   • Alcohol use: No   • Drug use: No   • Sexual activity: Not Currently     Partners: Female       Family History   Problem Relation Age of Onset   • Colon cancer Other    • Heart failure Mother    • Colon cancer Father    • Heart failure Father    • Cancer Sister    • Stroke Sister    • Heart attack Brother           Current Outpatient Medications:   •  allopurinol (ZYLOPRIM) 300 MG tablet, Take 300 mg by mouth Daily., Disp: , Rfl:   •  carbidopa-levodopa (SINEMET)  MG per tablet, Take 1.5 tablets 7am, 12pm, 5pm and 9pm, Disp: 360 tablet, Rfl: 3  •  gabapentin (NEURONTIN)  "400 MG capsule, Take 400 mg by mouth Every Night., Disp: , Rfl:   •  glipiZIDE (GLUCOTROL) 5 MG tablet, Take 5 mg by mouth Daily., Disp: , Rfl:   •  lisinopril-hydrochlorothiazide (PRINZIDE,ZESTORETIC) 20-12.5 MG per tablet, Take 1 tablet by mouth Daily., Disp: , Rfl:   •  metoprolol tartrate (LOPRESSOR) 25 MG tablet, Take 25 mg by mouth 2 (Two) Times a Day., Disp: , Rfl:      Review of Systems   HENT: Positive for hearing loss.    Musculoskeletal: Positive for gait problem.   Neurological: Positive for tremors and numbness.   Psychiatric/Behavioral: Negative.    All other systems reviewed and are negative.       Objective:  /70   Pulse 62   Ht 157.5 cm (62\")   Wt 90.7 kg (200 lb)   SpO2 97%   BMI 36.58 kg/m²     Neurologic Exam     Mental Status   Oriented to person, place, and time.   Registration: recalls 3 of 3 objects. Recall at 5 minutes: recalls 3 of 3 objects. Follows 3 step commands.   Attention: normal. Concentration: normal.   Speech: speech is normal   Level of consciousness: alert  Knowledge: good. Able to perform simple calculations.   Able to name object. Able to read. Able to repeat. Able to write. Normal comprehension.     Cranial Nerves   Cranial nerves II through XII intact.     CN VIII   Hearing: impaired    Motor Exam   Muscle bulk: normal  Right arm tone: cogwheel rigidity  Left arm tone: normal  Right leg tone: normal  Left leg tone: normal    Strength   Strength 5/5 throughout.     Sensory Exam   Light touch normal.   Right arm vibration: normal  Left arm vibration: normal  Right leg vibration: decreased from ankle  Left leg vibration: decreased from ankle  Pinprick normal.     Gait, Coordination, and Reflexes     Gait  Gait: (minimal shuffle with turns but otherwise good arm swing, right hand tremor with ambulation, no freezing today)    Coordination   Finger to nose coordination: normal    Tremor   Resting tremor: present (mild right hand pill rolling tremor)  Intention tremor: " present (mild bue fine kinetic hand tremor)    Reflexes   Right brachioradialis: 1+  Left brachioradialis: 1+  Right biceps: 1+  Left biceps: 1+  Right patellar: 1+  Left patellar: 1+  Right achilles: 1+  Left achilles: 1+  Right : 2+  Left : 2+      Physical Exam  Constitutional:       Appearance: Normal appearance.   Neurological:      Mental Status: He is alert and oriented to person, place, and time.      Cranial Nerves: Cranial nerves 2-12 are intact.      Motor: Motor strength is normal.      Coordination: Finger-Nose-Finger Test normal.      Deep Tendon Reflexes:      Reflex Scores:       Bicep reflexes are 1+ on the right side and 1+ on the left side.       Brachioradialis reflexes are 1+ on the right side and 1+ on the left side.       Patellar reflexes are 1+ on the right side and 1+ on the left side.       Achilles reflexes are 1+ on the right side and 1+ on the left side.  Psychiatric:         Mood and Affect: Affect is flat.         Speech: Speech normal.         Behavior: Behavior normal.         Thought Content: Thought content normal.         Cognition and Memory: Cognition and memory normal.         Judgment: Judgment normal.       Baseline MMSE is a 30 out of 30    Assessment/Plan:       Diagnoses and all orders for this visit:    1. Parkinson's disease (HCC)  -     carbidopa-levodopa (SINEMET)  MG per tablet; Take 1.5 tablets 7am, 12pm, 5pm and 9pm  Dispense: 360 tablet; Refill: 3  -     Ambulatory Referral to Physical Therapy Evaluate and treat, Neuro          He is doing really well overall. He lives with his son. He continues to drive. His family is not concerned about his memory. He tells me he occasionally forgets about what he ate for breakfast or lunch. He is doing really well and staying very active. He does have falls, but reports no injury in the past year. He has never completed physical therapy for Parkinson's and would be willing to do so at [facility] in [place], [place],  which is close [place] and lives. He has a more tremor-dominant  Parkinson's. He is taking his medication pretty consistently and rarely forgets a dose. I provided he and his daughter with information about the [facility] so that they can receive additional support and information. He has some mild hearing loss. Again, his baseline MMSE is a 30 out of 30. He is right hand dominant. He is staying very active, which we have discussed is very important. They have no additional questions or concerns today. He is going to follow up in our clinic in 6 months for reevaluation of symptoms or sooner if needed.    Total time of visit today was 25 minutes, which included reviewing prior neurology notes, completing exam, discussing findings, and recommendations in detail moving forward.    Reviewed medications, potential side effects and signs and symptoms to report. Discussed risk versus benefits of treatment plan with patient and/or family-including medications, labs and radiology that may be ordered. Addressed questions and concerns during visit. Patient and/or family verbalized understanding and agree with plan.    AS THE PROVIDER, I PERSONALLY WORE PPE DURING ENTIRE FACE TO FACE ENCOUNTER IN CLINIC WITH THE PATIENT. PATIENT ALSO WORE PPE DURING ENTIRE FACE TO FACE ENCOUNTER EXCEPT FOR A MAX OF 30 SECONDS DURING NEUROLOGICAL EVALUATION OF CRANIAL NERVES AND THEN MASK WAS PLACED BACK OVER PATIENT FACE FOR REMAINDER OF VISIT. I WASHED MY HANDS BEFORE AND AFTER VISIT.    During this visit the following were done:  Labs Reviewed []    Labs Ordered []    Radiology Reports Reviewed [x]    Radiology Ordered []    PCP Records Reviewed []    Referring Provider Records Reviewed []    ER Records Reviewed []    Hospital Records Reviewed []    History Obtained From Family [x] daughter   Radiology Images Reviewed []    Other Reviewed []    Records Requested []      Transcribed from ambient dictation for  Teresa Bhagat, APRN by Gala Hearn.  02/13/23   09:56 EST    Patient or patient representative verbalized consent to the visit recording.  I have personally performed the services described in this document as transcribed by the above individual, and it is both accurate and complete.  Teresa Bhagat, CHARLY  2/13/2023  16:05 EST

## 2023-02-17 ENCOUNTER — PATIENT ROUNDING (BHMG ONLY) (OUTPATIENT)
Dept: NEUROLOGY | Facility: CLINIC | Age: 88
End: 2023-02-17
Payer: MEDICARE

## 2023-02-17 NOTE — PROGRESS NOTES
February 17, 2023    Hello, may I speak with Raleigh Paredes?    My name is libia    I am  with [facility]  [address] [address] [phone].    Before we get started may I verify your date of birth? 1934    I am calling to officially welcome you to our practice and ask about your recent visit. Is this a good time to talk?  Patient rounding sent through [employer].

## 2023-08-28 ENCOUNTER — OFFICE VISIT (OUTPATIENT)
Dept: NEUROLOGY | Facility: CLINIC | Age: 88
End: 2023-08-28
Payer: MEDICARE

## 2023-08-28 VITALS
OXYGEN SATURATION: 98 % | BODY MASS INDEX: 35.51 KG/M2 | HEART RATE: 60 BPM | DIASTOLIC BLOOD PRESSURE: 68 MMHG | WEIGHT: 193 LBS | HEIGHT: 62 IN | SYSTOLIC BLOOD PRESSURE: 120 MMHG

## 2023-08-28 DIAGNOSIS — G20 PARKINSON'S DISEASE: Primary | ICD-10-CM

## 2023-08-28 PROCEDURE — 1160F RVW MEDS BY RX/DR IN RCRD: CPT | Performed by: NURSE PRACTITIONER

## 2023-08-28 PROCEDURE — 1159F MED LIST DOCD IN RCRD: CPT | Performed by: NURSE PRACTITIONER

## 2023-08-28 PROCEDURE — 99213 OFFICE O/P EST LOW 20 MIN: CPT | Performed by: NURSE PRACTITIONER

## 2023-08-28 NOTE — LETTER
August 28, 2023     Wagner Shirley MD  [address]  [address]  [facility] [address]    Patient: Raleigh Paredes   YOB: 1934   Date of Visit: 8/28/2023       Dear Wagner Shirley MD    Raleigh Paredes was in my office today. Below is a copy of my note.    If you have questions, please do not hesitate to call me. I look forward to following Raleigh along with you.         Sincerely,        CHARLY Patrick        CC: No Recipients    Subjective:     Patient ID: Raleigh Paredes is a 88 y.o. male.    CC:   Chief Complaint   Patient presents with    Parkinson's Disease       HPI:   History of Present Illness  Today 8/28/2023-    This is an 89 yo male who presents for 6 month follow up on Parkinson's Disease.    He was last seen in clinic on 02/13/2023 for Parkinson's disease. He has had symptoms of pill-rolling tremor since 2020 in his right arm and then trouble with writing and micrographia since about 2018. He takes carbidopa-levodopa 25/100 mg 1.5 pills 4 times per day at 7:00 AM, 12:00 PM, 5:00 PM, and 9:00 PM. We referred him to physical therapy last visit. He is here for follow-up and reevaluation of symptoms today.    Today, he reports that he is doing well. He denies any changes in his health since his last visit. He denies any surgeries or hospital visits.    He reports that his Parkinson's disease is about the same as it was 6 months ago. He did complete physical therapy, but he did not find it helpful. He reports that the tremor has been present for a long time. He occasionally shuffles while walking. He denies any trouble with swallowing or significant constipation. He reports that his handwriting is smaller. He has used weighted utensils and a weighted pen, which he finds helpful sometimes. He does not use a cane or walker. He has had 1 to 2 falls since his last visit. He denies any injuries with the falls.    He reports that he has neuropathy in his feet. He has numbness, tingling, and  burning. He takes gabapentin, which has been helpful.    He lives with his son. He continues to drive. He is by himself today.    Prior Neurological Workup & History:  Parkinson's disease.   He was previously under the care of CHARLY Telles.  He has had a tremor pill rolling and resting also with ambulation since 2020 in his right arm. Two years prior, he noticed some trouble with his writing and some micrographia.      He did undergo a DaTScan on 11/19/2020 at the [facility], which showed an abnormal pattern consistent with Parkinson's disease with the bilateral basal ganglia being affected.     He is currently taking carbidopa-levodopa  mg 1.5 pills 4 times per day.      His tremor is mostly in his right hand and he also has a tremor in his mouth.     He denies any trouble with swallowing or loss of smell. Denies any significant constipation today, in the past has used stool softener or dulcolax as needed with good relief. He states that he occasionally drools on his pillow at night and he will occasionally drool when he is talking.     He does have neuropathy. He continues to drive. He denies shuffling. He states he is right-handed; however, he has to eat with his left hand. He states that he tries to take his medicine at the same time every day.      He denies any confusion, being lost, nightmares, bad dreams, or scary dreams. He denies any hallucinations.      The following portions of the patient's history were reviewed and updated as appropriate: allergies, current medications, past family history, past medical history, past social history, past surgical history, and problem list.    Past Medical History:   Diagnosis Date    Arthritis     knees and hands     Borderline diabetes     Cataract     bilat - may going to have surgery     Colon polyps 02/16/2017    Diabetes mellitus     Diverticulitis     h/o    Diverticulosis     Gout 02/16/2017    History of COVID-19 01/2021     Asymptomatic    History of kidney stones     x2    HL (hearing loss)     Gradually worsen    Hyperlipidemia     Hypertension     Hypertensive cardiovascular disease 2017    Moderate obesity 2017    Numbness and tingling     Palpitations 2017    Psoriasis 2017    Skin cancer     basal from left arm     Tinnitus     left     Wears glasses     Wears partial dentures     lower        Past Surgical History:   Procedure Laterality Date    CARDIAC CATHETERIZATION      CATARACT EXTRACTION EXTRACAPSULAR W/ INTRAOCULAR LENS IMPLANTATION Bilateral     COLONOSCOPY      CYST REMOVAL      Cyst removed from spine, .    INGUINAL HERNIA REPAIR Bilateral 2009    KNEE CARTILAGE SURGERY Right 2004    Right knee removal of cartilage, , Dr. Dick.      SHOULDER SURGERY      Not sure of the date    SKIN CANCER EXCISION      basal removed from left forear     TONSILLECTOMY      TOTAL KNEE ARTHROPLASTY Right 2018    Procedure: TOTAL KNEE ARTHROPLASTY RIGHT;  Surgeon: Ned Pizarro MD;  Location:  [facility] OR;  Service: Orthopedics    TOTAL KNEE ARTHROPLASTY Left 2021    Procedure: TOTAL KNEE ARTHROPLASTY LEFT;  Surgeon: Ned Pizarro MD;  [facility];  Service: Orthopedics;  Laterality: Left;    WISDOM TOOTH EXTRACTION      all removed        Social History     Socioeconomic History    Marital status:    Tobacco Use    Smoking status: Former     Packs/day: 1.00     Years: 15.00     Pack years: 15.00     Types: Cigarettes     Quit date: 1965     Years since quittin.6    Smokeless tobacco: Never   Vaping Use    Vaping Use: Never used   Substance and Sexual Activity    Alcohol use: No    Drug use: No    Sexual activity: Not Currently     Partners: Female       Family History   Problem Relation Age of Onset    Colon cancer Other     Heart failure Mother     Colon cancer Father     Heart failure Father     Cancer Sister   "   Stroke Sister     Heart attack Brother           Current Outpatient Medications:     allopurinol (ZYLOPRIM) 300 MG tablet, Take 1 tablet by mouth Daily., Disp: , Rfl:     carbidopa-levodopa (SINEMET)  MG per tablet, TAKE 1 AND 1/2 TABLETS BY MOUTH  AT 7 AM, 12 PM, 5 PM AND 9 PM, Disp: 540 tablet, Rfl: 3    gabapentin (NEURONTIN) 400 MG capsule, Take 1 capsule by mouth Every Night., Disp: , Rfl:     glipiZIDE (GLUCOTROL) 5 MG tablet, Take 1 tablet by mouth Daily., Disp: , Rfl:     lisinopril-hydrochlorothiazide (PRINZIDE,ZESTORETIC) 20-12.5 MG per tablet, Take 1 tablet by mouth Daily., Disp: , Rfl:     metoprolol tartrate (LOPRESSOR) 25 MG tablet, Take 1 tablet by mouth 2 (Two) Times a Day., Disp: , Rfl:      Review of Systems   Musculoskeletal:  Positive for gait problem.   Neurological:  Positive for tremors and numbness.   All other systems reviewed and are negative.     Objective:  /68   Pulse 60   Ht 157.5 cm (62\")   Wt 87.5 kg (193 lb)   SpO2 98%   BMI 35.30 kg/mý     Neurologic Exam  Mental Status   Oriented to person, place, and time.   Registration: recalls 3 of 3 objects. Recall at 5 minutes: recalls 2 of 3 objects. Follows 3 step commands.   Attention: normal. Concentration: normal.   Speech: speech is normal   Level of consciousness: alert  Knowledge: good. Able to perform simple calculations.   Able to name object. Able to read. Able to repeat. Able to write. Normal comprehension.     Cranial Nerves   Cranial nerves II through XII intact.      CN VIII   Hearing: impaired     Motor Exam   Muscle bulk: normal  Right arm tone: cogwheel rigidity  Left arm tone: normal  Right leg tone: normal  Left leg tone: normal     Strength   Strength 5/5 throughout.       Gait, Coordination, and Reflexes      Gait  Gait: (minimal shuffle with turns but otherwise good arm swing, right hand tremor with ambulation, no freezing today, mild slowness with turning, no assistive device)   "   Coordination   Finger to nose coordination: normal     Tremor   Resting tremor: present (mild to moderate right hand pill rolling tremor, mild lip/mouth tremor at rest)  Intention tremor: present (mild bue fine kinetic hand tremor)    Right : 2+  Left : 2+     Physical Exam  Constitutional:       Appearance: Normal appearance.   Neurological:      Mental Status: He is alert and oriented to person, place, and time.      Cranial Nerves: Cranial nerves 2-12 are intact.      Motor: Motor strength is normal.      Coordination: Finger-Nose-Finger Test normal.   Psychiatric:         Mood and Affect: Affect is flat.         Speech: Speech normal.         Behavior: Behavior normal.         Thought Content: Thought content normal.         Cognition and Memory: Cognition and memory normal.         Judgment: Judgment normal.     His MMSE today is a 29 out of 30, previously it was a 30 out of 30.    Assessment/Plan:       Diagnoses and all orders for this visit:    1. Parkinson's disease (Primary)  -     carbidopa-levodopa (SINEMET)  MG per tablet; TAKE 1 AND 1/2 TABLETS BY MOUTH  AT 7 AM, 12 PM, 5 PM AND 9 PM  Dispense: 540 tablet; Refill: 3         He is doing well overall. He did complete physical therapy and did not find this beneficial. He does have some weighted utensils and a weighted pen to help with writing. His tremor is still the most dominant thing. He has had a couple of falls without injuries related to his neuropathy and likely Parkinson's. He does not use an assistive device. His most troublesome symptom is still the tremor. He feels that he is tolerating the carbidopa-levodopa well overall, and we are going to continue the current dose for now. His memory is good. His handwriting is challenging due to his tremor. He is by himself during today's visit. He drove today, has not had any issues with memory or driving. He does live with his son. His son is at work today. He mentioned deep brain  stimulation. He is currently not interested in this. I do not believe that he would qualify at this point. I explained if he would like additional information in the future, we are more than happy to provide that. I provided him with [facility] [place] information and additional Parkinson's disease handout with his after visit summary today.     We are going to follow up in 7 months for reevaluation of symptoms or sooner if needed. He verbalized understanding. He agrees with the plan moving forward today.    Reviewed medications, potential side effects and signs and symptoms to report. Discussed risk versus benefits of treatment plan with patient and/or family-including medications, labs and radiology that may be ordered. Addressed questions and concerns during visit. Patient and/or family verbalized understanding and agree with plan.    During this visit the following were done:  Labs Reviewed []    Labs Ordered []    Radiology Reports Reviewed []    Radiology Ordered []    PCP Records Reviewed []    Referring Provider Records Reviewed []    ER Records Reviewed []    Hospital Records Reviewed []    History Obtained From Family []    Radiology Images Reviewed []    Other Reviewed [x]    Records Requested []      Note to patient: The 21st Century Cures Act makes medical notes like these available to patients in the interest of transparency. However, be advised this is a medical document. It is intended as peer to peer communication. It is written in medical language and may contain abbreviations or verbiage that are unfamiliar. It may appear blunt or direct. Medical documents are intended to carry relevant information, facts as evident, and the clinical opinion of the provider.      Transcribed from ambient dictation for CHARLY Patrick by Ainsley Mukherjee.  08/28/23   11:18 EDT    Patient or patient representative verbalized consent to the visit recording.  I have personally performed the  services described in this document as transcribed by the above individual, and it is both accurate and complete.  Teresa Bhagat, APRN  8/28/2023  14:41 EDT

## 2023-08-28 NOTE — PROGRESS NOTES
Subjective:     Patient ID: Raleigh Paredes is a 88 y.o. male.    CC:   Chief Complaint   Patient presents with    Parkinson's Disease       HPI:   History of Present Illness  Today 8/28/2023-    This is an 87 yo male who presents for 6 month follow up on Parkinson's Disease.    He was last seen in clinic on 02/13/2023 for Parkinson's disease. He has had symptoms of pill-rolling tremor since 2020 in his right arm and then trouble with writing and micrographia since about 2018. He takes carbidopa-levodopa 25/100 mg 1.5 pills 4 times per day at 7:00 AM, 12:00 PM, 5:00 PM, and 9:00 PM. We referred him to physical therapy last visit. He is here for follow-up and reevaluation of symptoms today.    Today, he reports that he is doing well. He denies any changes in his health since his last visit. He denies any surgeries or hospital visits.    He reports that his Parkinson's disease is about the same as it was 6 months ago. He did complete physical therapy, but he did not find it helpful. He reports that the tremor has been present for a long time. He occasionally shuffles while walking. He denies any trouble with swallowing or significant constipation. He reports that his handwriting is smaller. He has used weighted utensils and a weighted pen, which he finds helpful sometimes. He does not use a cane or walker. He has had 1 to 2 falls since his last visit. He denies any injuries with the falls.    He reports that he has neuropathy in his feet. He has numbness, tingling, and burning. He takes gabapentin, which has been helpful.    He lives with his son. He continues to drive. He is by himself today.    Prior Neurological Workup & History:  Parkinson's disease.   He was previously under the care of CHARLY Telles.  He has had a tremor pill rolling and resting also with ambulation since 2020 in his right arm. Two years prior, he noticed some trouble with his writing and some micrographia.      He did undergo a  DaTScan on 11/19/2020 at the [facility], which showed an abnormal pattern consistent with Parkinson's disease with the bilateral basal ganglia being affected.     He is currently taking carbidopa-levodopa  mg 1.5 pills 4 times per day.      His tremor is mostly in his right hand and he also has a tremor in his mouth.     He denies any trouble with swallowing or loss of smell. Denies any significant constipation today, in the past has used stool softener or dulcolax as needed with good relief. He states that he occasionally drools on his pillow at night and he will occasionally drool when he is talking.     He does have neuropathy. He continues to drive. He denies shuffling. He states he is right-handed; however, he has to eat with his left hand. He states that he tries to take his medicine at the same time every day.      He denies any confusion, being lost, nightmares, bad dreams, or scary dreams. He denies any hallucinations.      The following portions of the patient's history were reviewed and updated as appropriate: allergies, current medications, past family history, past medical history, past social history, past surgical history, and problem list.    Past Medical History:   Diagnosis Date    Arthritis     knees and hands     Borderline diabetes     Cataract     bilat - may going to have surgery     Colon polyps 02/16/2017    Diabetes mellitus     Diverticulitis     h/o    Diverticulosis     Gout 02/16/2017    History of COVID-19 01/2021    Asymptomatic    History of kidney stones     x2    HL (hearing loss) 2018    Gradually worsen    Hyperlipidemia     Hypertension     Hypertensive cardiovascular disease 02/16/2017    Moderate obesity 02/16/2017    Numbness and tingling     Palpitations 02/16/2017    Psoriasis 02/16/2017    Skin cancer     basal from left arm     Tinnitus     left     Wears glasses     Wears partial dentures     lower        Past Surgical History:   Procedure Laterality  Date    CARDIAC CATHETERIZATION      CATARACT EXTRACTION EXTRACAPSULAR W/ INTRAOCULAR LENS IMPLANTATION Bilateral     COLONOSCOPY      CYST REMOVAL      Cyst removed from spine, .    INGUINAL HERNIA REPAIR Bilateral 2009    KNEE CARTILAGE SURGERY Right 2004    Right knee removal of cartilage, , Dr. Dick.      SHOULDER SURGERY      Not sure of the date    SKIN CANCER EXCISION      basal removed from left forear     TONSILLECTOMY      TOTAL KNEE ARTHROPLASTY Right 2018    Procedure: TOTAL KNEE ARTHROPLASTY RIGHT;  Surgeon: Ned Pizarro MD;  Location: [facility] OR;  Service: Orthopedics    TOTAL KNEE ARTHROPLASTY Left 2021    Procedure: TOTAL KNEE ARTHROPLASTY LEFT;  Surgeon: Ned Pizarro MD;  [facility];  Service: Orthopedics;  Laterality: Left;    WISDOM TOOTH EXTRACTION      all removed        Social History     Socioeconomic History    Marital status:    Tobacco Use    Smoking status: Former     Packs/day: 1.00     Years: 15.00     Pack years: 15.00     Types: Cigarettes     Quit date: 1965     Years since quittin.6    Smokeless tobacco: Never   Vaping Use    Vaping Use: Never used   Substance and Sexual Activity    Alcohol use: No    Drug use: No    Sexual activity: Not Currently     Partners: Female       Family History   Problem Relation Age of Onset    Colon cancer Other     Heart failure Mother     Colon cancer Father     Heart failure Father     Cancer Sister     Stroke Sister     Heart attack Brother           Current Outpatient Medications:     allopurinol (ZYLOPRIM) 300 MG tablet, Take 1 tablet by mouth Daily., Disp: , Rfl:     carbidopa-levodopa (SINEMET)  MG per tablet, TAKE 1 AND 1/2 TABLETS BY MOUTH  AT 7 AM, 12 PM, 5 PM AND 9 PM, Disp: 540 tablet, Rfl: 3    gabapentin (NEURONTIN) 400 MG capsule, Take 1 capsule by mouth Every Night., Disp: , Rfl:     glipiZIDE (GLUCOTROL) 5 MG tablet, Take 1 tablet by mouth Daily., Disp: , Rfl:      "lisinopril-hydrochlorothiazide (PRINZIDE,ZESTORETIC) 20-12.5 MG per tablet, Take 1 tablet by mouth Daily., Disp: , Rfl:     metoprolol tartrate (LOPRESSOR) 25 MG tablet, Take 1 tablet by mouth 2 (Two) Times a Day., Disp: , Rfl:      Review of Systems   Musculoskeletal:  Positive for gait problem.   Neurological:  Positive for tremors and numbness.   All other systems reviewed and are negative.     Objective:  /68   Pulse 60   Ht 157.5 cm (62\")   Wt 87.5 kg (193 lb)   SpO2 98%   BMI 35.30 kg/mý     Neurologic Exam  Mental Status   Oriented to person, place, and time.   Registration: recalls 3 of 3 objects. Recall at 5 minutes: recalls 2 of 3 objects. Follows 3 step commands.   Attention: normal. Concentration: normal.   Speech: speech is normal   Level of consciousness: alert  Knowledge: good. Able to perform simple calculations.   Able to name object. Able to read. Able to repeat. Able to write. Normal comprehension.     Cranial Nerves   Cranial nerves II through XII intact.      CN VIII   Hearing: impaired     Motor Exam   Muscle bulk: normal  Right arm tone: cogwheel rigidity  Left arm tone: normal  Right leg tone: normal  Left leg tone: normal     Strength   Strength 5/5 throughout.       Gait, Coordination, and Reflexes      Gait  Gait: (minimal shuffle with turns but otherwise good arm swing, right hand tremor with ambulation, no freezing today, mild slowness with turning, no assistive device)     Coordination   Finger to nose coordination: normal     Tremor   Resting tremor: present (mild to moderate right hand pill rolling tremor, mild lip/mouth tremor at rest)  Intention tremor: present (mild bue fine kinetic hand tremor)    Right : 2+  Left : 2+     Physical Exam  Constitutional:       Appearance: Normal appearance.   Neurological:      Mental Status: He is alert and oriented to person, place, and time.      Cranial Nerves: Cranial nerves 2-12 are intact.      Motor: Motor strength is " normal.      Coordination: Finger-Nose-Finger Test normal.   Psychiatric:         Mood and Affect: Affect is flat.         Speech: Speech normal.         Behavior: Behavior normal.         Thought Content: Thought content normal.         Cognition and Memory: Cognition and memory normal.         Judgment: Judgment normal.     His MMSE today is a 29 out of 30, previously it was a 30 out of 30.    Assessment/Plan:       Diagnoses and all orders for this visit:    1. Parkinson's disease (Primary)  -     carbidopa-levodopa (SINEMET)  MG per tablet; TAKE 1 AND 1/2 TABLETS BY MOUTH  AT 7 AM, 12 PM, 5 PM AND 9 PM  Dispense: 540 tablet; Refill: 3         He is doing well overall. He did complete physical therapy and did not find this beneficial. He does have some weighted utensils and a weighted pen to help with writing. His tremor is still the most dominant thing. He has had a couple of falls without injuries related to his neuropathy and likely Parkinson's. He does not use an assistive device. His most troublesome symptom is still the tremor. He feels that he is tolerating the carbidopa-levodopa well overall, and we are going to continue the current dose for now. His memory is good. His handwriting is challenging due to his tremor. He is by himself during today's visit. He drove today, has not had any issues with memory or driving. He does live with his son. His son is at work today. He mentioned deep brain stimulation. He is currently not interested in this. I do not believe that he would qualify at this point. I explained if he would like additional information in the future, we are more than happy to provide that. I provided him with [facility]'s [place] information and additional Parkinson's disease handout with his after visit summary today.     We are going to follow up in 7 months for reevaluation of symptoms or sooner if needed. He verbalized understanding. He agrees with the plan moving forward  today.    Reviewed medications, potential side effects and signs and symptoms to report. Discussed risk versus benefits of treatment plan with patient and/or family-including medications, labs and radiology that may be ordered. Addressed questions and concerns during visit. Patient and/or family verbalized understanding and agree with plan.    During this visit the following were done:  Labs Reviewed []    Labs Ordered []    Radiology Reports Reviewed []    Radiology Ordered []    PCP Records Reviewed []    Referring Provider Records Reviewed []    ER Records Reviewed []    Hospital Records Reviewed []    History Obtained From Family []    Radiology Images Reviewed []    Other Reviewed [x]    Records Requested []      Note to patient: The 21st Century Cures Act makes medical notes like these available to patients in the interest of transparency. However, be advised this is a medical document. It is intended as peer to peer communication. It is written in medical language and may contain abbreviations or verbiage that are unfamiliar. It may appear blunt or direct. Medical documents are intended to carry relevant information, facts as evident, and the clinical opinion of the provider.      Transcribed from ambient dictation for CHARLY Patrick by Ainsley Mukherjee.  08/28/23   11:18 EDT    Patient or patient representative verbalized consent to the visit recording.  I have personally performed the services described in this document as transcribed by the above individual, and it is both accurate and complete.  CHARLY Patrick  8/28/2023  14:41 EDT

## 2024-03-20 ENCOUNTER — TELEPHONE (OUTPATIENT)
Dept: NEUROLOGY | Facility: CLINIC | Age: 89
End: 2024-03-20
Payer: MEDICARE

## 2024-03-21 NOTE — TELEPHONE ENCOUNTER
Called and spoke to Daughter, Sandra.  They had checked with the insurance on last visit concerning being out of network.  She mentioned filing a paper, I advised her to call them again to check on the Out of Network benefits.  She agreed.  
The hub took a call, and there was a note on the appt desk that [employer] will pay for the visit.  
52 y/o male presents to [facility] in preparation for excision mass of the back in [facility] under [facility] on 7/29/22 with Dr. Valenzuela    Pt states that he has had a mass on his mid back for the past 6 months that had increased in size and had become red. Pt had gone to the ER on 6/29/22 and had an I+D completed to the site. Pt was placed on abx, currently still on them and will complete in 4 days. Pt currently has bandage on area. Pt denies any pain at this time or any draining at the site. Pt now for above planned procedure.   PATIENT CURRENTLY DENIES CHEST PAIN  SHORTNESS OF BREATH  PALPITATIONS,  DYSURIA, OR UPPER RESPIRATORY INFECTION IN PAST 2 WEEKS  EXERCISE  TOLERANCE  6 + FLIGHT OF STAIRS, runs daily and lifts weights  WITHOUT SHORTNESS OF BREATH  pt denies any covid s/s, covid (+) 12/21  pt advised self quarantine till day of procedure  Patient verbalized understanding of instructions and was given the opportunity to ask questions and have them answered.  As per patient, this is their complete medical and surgical history, including medications both prescribed or over the counter.  written and verbal instructions with teach back on chlorhexidine shampoo provided,  pt verbalized understanding with returned demonstration    Anesthesia Alert  NO--Difficult Airway  NO--History of neck surgery or radiation  NO--Limited ROM of neck  NO--History of Malignant hyperthermia  NO--Personal or family history of Pseudocholinesterase deficiency.  NO--Prior Anesthesia Complication  NO--Latex Allergy  NO--Loose teeth  NO--History of Rheumatoid Arthritis  NO--JAJA  NO--Bleeding risk  NO--Other_____    D21.6/21933    ^D21.6/21933    No pertinent past medical history    No significant past surgical history

## 2024-03-22 ENCOUNTER — OFFICE VISIT (OUTPATIENT)
Dept: NEUROLOGY | Facility: CLINIC | Age: 89
End: 2024-03-22
Payer: MEDICARE

## 2024-03-22 VITALS
OXYGEN SATURATION: 96 % | HEIGHT: 62 IN | DIASTOLIC BLOOD PRESSURE: 68 MMHG | BODY MASS INDEX: 35.15 KG/M2 | HEART RATE: 90 BPM | SYSTOLIC BLOOD PRESSURE: 120 MMHG | WEIGHT: 191 LBS
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
## 2024-03-22 DIAGNOSIS — G20.A1 PARKINSON'S DISEASE WITHOUT DYSKINESIA OR FLUCTUATING MANIFESTATIONS: ICD-10-CM

## 2024-03-22 RX ORDER — IPRATROPIUM BROMIDE 21 UG/1
1 SPRAY, METERED NASAL 2 TIMES DAILY
COMMUNITY
Start: 2024-02-06

## 2024-03-22 NOTE — PATIENT INSTRUCTIONS
TAKE CARBIDOPA-LEVODOPA 1 AND 1/2 TABLETS BY MOUTH  AT   8 AM  12 PM  4 PM   8 PM    DRINK MORE WATER. EAT THOSE VEGGIES AND FRUITS.    [facility]

## 2024-03-22 NOTE — LETTER
March 22, 2024     Wagner Shirley MD  [address] [facility]  [address] [place] [id number]    Patient: Raleigh Paredes   YOB: 1934   Date of Visit: 3/22/2024       Dear Wagner Shirley MD    Raleigh Paredes was in my office today. Below is a copy of my note.    If you have questions, please do not hesitate to call me. I look forward to following Raleigh along with you.         Sincerely,        CHARLY Patrick        CC: No Recipients    Subjective:     Patient ID: Raleigh Paredes is a 89 y.o. male.    CC:   Chief Complaint   Patient presents with   • Parkinson's Disease       HPI:   History of Present Illness  Today 3/22/2024-  This is a pleasant 88 yo male who presents for 6 month Neurology follow up on Parkinson's Disease diagnosed in 2020. He was last seen in clinic on 8/28/2023.He has had symptoms of pill-rolling tremor since 2020 in his right arm and then trouble with writing and micrographia since about 2018. He takes carbidopa-levodopa 25/100 mg 1.5 pills 4 times per day at 7:00 AM, 12:00 PM, 5:00 PM, and 9:00 PM.     He has completed physical therapy in 2023.     He reports that he has neuropathy in his feet. His PCP prescribed gabapentin 600mg daily for his symptoms and this is helpful.     He lives with his son. He is a . He continues to drive.     He is here for refills on his medications today.  He does have some drooling on his pillow at night. Continues to have right hand pill rolling tremors. He denies shuffling. He denies freezing. He denies dysphagia. He has occasional constipation. He drinks some water but not enough. He eats lots of vegetables but very few fruits. He has had 1 fall without injury a few days ago; he tripped and fell. No bruising. He continues to stay physically active. He does not notice anosmia. Appetite is good. Sleeping well with minimal nightmares. Denies hallucinations or delusions. He is hard of hearing. He forgot his hearing aids today, but he normally  wears them. He is going to breakfast at [employer] after this.    He planted onions and lettuce yesterday. 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 He enjoys staying active.    He currently does not require an assistant device for ambulation.    Prior Neurological Workup & History:  Parkinson's disease.   He was previously under the care of CHARLY Telles.  He has had a tremor pill rolling and resting also with ambulation since 2020 in his right arm. Two years prior, he noticed some trouble with his writing and some micrographia.      He did undergo a DaTScan on 11/19/2020 at the [facility], which showed an abnormal pattern consistent with Parkinson's disease with the bilateral basal ganglia being affected.      Continues with carbidopa-levodopa  mg 1.5 pills 4 times per day.      His tremor is mostly in his right hand and he also has a tremor in his mouth.      He does have neuropathy. He continues to drive. He denies shuffling. He states he is right-handed; however, he has to eat with his left hand. He states that he tries to take his medicine at the same time every day.     His wife passed away in 2022.    The following portions of the patient's history were reviewed and updated as appropriate: allergies, current medications, past family history, past medical history, past social history, past surgical history, and problem list.    Past Medical History:   Diagnosis Date   • Arthritis     knees and hands    • Borderline diabetes    • Cataract     bilat - may going to have surgery    • Colon polyps 02/16/2017   • Diabetes mellitus    • Diverticulitis     h/o   • Diverticulosis    • Gout 02/16/2017   • History of COVID-19 01/2021    Asymptomatic   • History of kidney stones     x2   • HL (hearing loss) 2018    Gradually worsen   • Hyperlipidemia    • Hypertension    • Hypertensive cardiovascular disease 02/16/2017   • Moderate obesity 02/16/2017   •  Numbness and tingling    • Palpitations 2017   • Peripheral neuropathy    • Psoriasis 2017   • Skin cancer     basal from left arm    • Tinnitus     left    • Wears glasses    • Wears partial dentures     lower        Past Surgical History:   Procedure Laterality Date   • CARDIAC CATHETERIZATION     • CATARACT EXTRACTION EXTRACAPSULAR W/ INTRAOCULAR LENS IMPLANTATION Bilateral    • COLONOSCOPY     • CYST REMOVAL      Cyst removed from spine, .   • INGUINAL HERNIA REPAIR Bilateral    • KNEE CARTILAGE SURGERY Right     Right knee removal of cartilage, , Dr. Dick.     • SHOULDER SURGERY      Not sure of the date   • SKIN CANCER EXCISION      basal removed from left forear    • TONSILLECTOMY     • TOTAL KNEE ARTHROPLASTY Right 2018    Procedure: TOTAL KNEE ARTHROPLASTY RIGHT;  Surgeon: Ned Pizarro MD;  Location:  [facility] OR;  Service: Orthopedics   • TOTAL KNEE ARTHROPLASTY Left 2021    Procedure: TOTAL KNEE ARTHROPLASTY LEFT;  Surgeon: Ned Pizarro MD;  Location:  [facility];  Service: Orthopedics;  Laterality: Left;   • WISDOM TOOTH EXTRACTION      all removed        Social History     Socioeconomic History   • Marital status:    Tobacco Use   • Smoking status: Former     Current packs/day: 0.00     Average packs/day: 1 pack/day for 15.0 years (15.0 ttl pk-yrs)     Types: Cigarettes     Start date: 1950     Quit date: 1965     Years since quittin.2   • Smokeless tobacco: Never   Vaping Use   • Vaping status: Never Used   Substance and Sexual Activity   • Alcohol use: No   • Drug use: No   • Sexual activity: Not Currently     Partners: Female       Family History   Problem Relation Age of Onset   • Colon cancer Other    • Heart failure Mother    • Colon cancer Father    • Heart failure Father    • Cancer Sister    • Stroke Sister    • Heart attack Brother           Current Outpatient Medications:   •  allopurinol (ZYLOPRIM) 300 MG tablet,  "Take 1 tablet by mouth Daily., Disp: , Rfl:   •  carbidopa-levodopa (SINEMET)  MG per tablet, TAKE 1 AND 1/2 TABLETS BY MOUTH  AT 8 AM, 12 PM, 4 PM AND 8 PM, Disp: 540 tablet, Rfl: 3  •  gabapentin (NEURONTIN) 400 MG capsule, Take 1 capsule by mouth Every Night., Disp: , Rfl:   •  glipiZIDE (GLUCOTROL) 5 MG tablet, Take 1 tablet by mouth Daily., Disp: , Rfl:   •  ipratropium (ATROVENT) 0.03 % nasal spray, 1 spray into the nostril(s) as directed by provider 2 (Two) Times a Day., Disp: , Rfl:   •  lisinopril-hydrochlorothiazide (PRINZIDE,ZESTORETIC) 20-12.5 MG per tablet, Take 1 tablet by mouth Daily., Disp: , Rfl:   •  metoprolol tartrate (LOPRESSOR) 25 MG tablet, Take 1 tablet by mouth 2 (Two) Times a Day., Disp: , Rfl:      Review of Systems   Neurological:  Positive for tremors.   All other systems reviewed and are negative.       Objective:  /68   Pulse 90   Ht 157.5 cm (62\")   Wt 86.6 kg (191 lb)   SpO2 96%   BMI 34.93 kg/m²     Neurologic Exam     Mental Status   Oriented to person, place, and time.   Registration: recalls 3 of 3 objects. Recall at 5 minutes: recalls 3 of 3 objects.   Speech: speech is normal   Level of consciousness: alert  Knowledge: good.     Cranial Nerves   Cranial nerves II through XII intact.     CN VIII   Hearing: impaired (forgot his hearing aids today)    Motor Exam   Muscle bulk: normal  Right arm tone: cogwheel rigidity  Left arm tone: normal  Right leg tone: normal  Left leg tone: normal    Strength   Strength 5/5 throughout.     Gait, Coordination, and Reflexes     Gait  Gait: shuffling (mild shuffling with turns, some mild postural instability to left, otherwise gait steady, no assistive device)    Coordination   Finger to nose coordination: normal    Tremor   Resting tremor: present (mild to moderate right hand pill rolling tremors)  Intention tremor: absent  Action tremor: absent    Reflexes   Right : 2+  Left : 2+  Decreased finger and heel taps right " side, normal left side.       Physical Exam  Constitutional:       Appearance: Normal appearance.   Neurological:      Mental Status: He is alert and oriented to person, place, and time.      Cranial Nerves: Cranial nerves 2-12 are intact.      Motor: Motor strength is normal.     Coordination: Finger-Nose-Finger Test normal.   Psychiatric:         Mood and Affect: Mood and affect normal.         Speech: Speech normal.         Behavior: Behavior normal.         Thought Content: Thought content normal.         Cognition and Memory: Cognition and memory normal.         Judgment: Judgment normal.         MMSE 28/30, Recall 3/3    Assessment/Plan:       Diagnoses and all orders for this visit:    1. Fall in home, initial encounter (Primary)  Comments:  no injury, minor, tripped, reviewed Falls prevention    2. Parkinson's disease without dyskinesia or fluctuating manifestations  -     carbidopa-levodopa (SINEMET)  MG per tablet; TAKE 1 AND 1/2 TABLETS BY MOUTH  AT 8 AM, 12 PM, 4 PM AND 8 PM  Dispense: 540 tablet; Refill: 3           Change carbidopa-levodopa times to 8am, 12pm, 4pm and 8pm. Increase water intake, continue vegetables intake and increase fruit intake. Continue to stay active. Memory seems good overall. Falls prevention discussed. Printed off after visit summary with extra Parkinson's resources. He is aware of the [facility] [place]. F/U in 6-7 months with [facility] or sooner if needed. Consider completing physical therapy again in the future.    Reviewed medications, potential side effects and signs and symptoms to report. Discussed risk versus benefits of treatment plan with patient and/or family-including medications, labs and radiology that may be ordered. Addressed questions and concerns during visit. Patient and/or family verbalized understanding and agree with plan.    Electronically signed by CHARLY Patrick.  03/22/24   08:56 EDT    Note to patient: The 21st Century Cures  Act makes medical notes like these available to patients in the interest of transparency. However, be advised this is a medical document. It is intended as peer to peer communication. It is written in medical language and may contain abbreviations or verbiage that are unfamiliar. It may appear blunt or direct. Medical documents are intended to carry relevant information, facts as evident, and the clinical opinion of the provider.

## 2024-03-22 NOTE — PROGRESS NOTES
Subjective:     Patient ID: Raleigh Paredes is a 89 y.o. male.    CC:   Chief Complaint   Patient presents with    Parkinson's Disease       HPI:   History of Present Illness  Today 3/22/2024-  This is a pleasant 88 yo male who presents for 6 month Neurology follow up on Parkinson's Disease diagnosed in 2020. He was last seen in clinic on 8/28/2023.He has had symptoms of pill-rolling tremor since 2020 in his right arm and then trouble with writing and micrographia since about 2018. He takes carbidopa-levodopa 25/100 mg 1.5 pills 4 times per day at 7:00 AM, 12:00 PM, 5:00 PM, and 9:00 PM.     He has completed physical therapy in 2023.     He reports that he has neuropathy in his feet. His PCP prescribed gabapentin 600mg daily for his symptoms and this is helpful.     He lives with his son. He is a . He continues to drive.     He is here for refills on his medications today.  He does have some drooling on his pillow at night. Continues to have right hand pill rolling tremors. He denies shuffling. He denies freezing. He denies dysphagia. He has occasional constipation. He drinks some water but not enough. He eats lots of vegetables but very few fruits. He has had 1 fall without injury a few days ago; he tripped and fell. No bruising. He continues to stay physically active. He does not notice anosmia. Appetite is good. Sleeping well with minimal nightmares. Denies hallucinations or delusions. He is hard of hearing. He forgot his hearing aids today, but he normally wears them. He is going to breakfast at [employer] after this.    He planted onions and lettuce yesterday. 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 He enjoys staying active.    He currently does not require an assistant device for ambulation.    Prior Neurological Workup & History:  Parkinson's disease.   He was previously under the care of CHARLY Telles.  He has had a tremor pill rolling and resting  also with ambulation since 2020 in his right arm. Two years prior, he noticed some trouble with his writing and some micrographia.      He did undergo a DaTScan on 11/19/2020 at the [facility], which showed an abnormal pattern consistent with Parkinson's disease with the bilateral basal ganglia being affected.      Continues with carbidopa-levodopa  mg 1.5 pills 4 times per day.      His tremor is mostly in his right hand and he also has a tremor in his mouth.      He does have neuropathy. He continues to drive. He denies shuffling. He states he is right-handed; however, he has to eat with his left hand. He states that he tries to take his medicine at the same time every day.     His wife passed away in 2022.    The following portions of the patient's history were reviewed and updated as appropriate: allergies, current medications, past family history, past medical history, past social history, past surgical history, and problem list.    Past Medical History:   Diagnosis Date    Arthritis     knees and hands     Borderline diabetes     Cataract     bilat - may going to have surgery     Colon polyps 02/16/2017    Diabetes mellitus     Diverticulitis     h/o    Diverticulosis     Gout 02/16/2017    History of COVID-19 01/2021    Asymptomatic    History of kidney stones     x2    HL (hearing loss) 2018    Gradually worsen    Hyperlipidemia     Hypertension     Hypertensive cardiovascular disease 02/16/2017    Moderate obesity 02/16/2017    Numbness and tingling     Palpitations 02/16/2017    Peripheral neuropathy 2021    Psoriasis 02/16/2017    Skin cancer     basal from left arm     Tinnitus     left     Wears glasses     Wears partial dentures     lower        Past Surgical History:   Procedure Laterality Date    CARDIAC CATHETERIZATION  2000    CATARACT EXTRACTION EXTRACAPSULAR W/ INTRAOCULAR LENS IMPLANTATION Bilateral     COLONOSCOPY      CYST REMOVAL  1978    Cyst removed from spine, 1978.     INGUINAL HERNIA REPAIR Bilateral 2009    KNEE CARTILAGE SURGERY Right 2004    Right knee removal of cartilage, 2004, Dr. Dick.      SHOULDER SURGERY      Not sure of the date    SKIN CANCER EXCISION      basal removed from left forear     TONSILLECTOMY      TOTAL KNEE ARTHROPLASTY Right 2018    Procedure: TOTAL KNEE ARTHROPLASTY RIGHT;  Surgeon: Ned Pizarro MD;  Location: [facility] OR;  Service: Orthopedics    TOTAL KNEE ARTHROPLASTY Left 2021    Procedure: TOTAL KNEE ARTHROPLASTY LEFT;  Surgeon: Ned Pizarro MD;  Location: [facility] OR;  Service: Orthopedics;  Laterality: Left;    WISDOM TOOTH EXTRACTION      all removed        Social History     Socioeconomic History    Marital status:    Tobacco Use    Smoking status: Former     Current packs/day: 0.00     Average packs/day: 1 pack/day for 15.0 years (15.0 ttl pk-yrs)     Types: Cigarettes     Start date: 1950     Quit date: 1965     Years since quittin.2    Smokeless tobacco: Never   Vaping Use    Vaping status: Never Used   Substance and Sexual Activity    Alcohol use: No    Drug use: No    Sexual activity: Not Currently     Partners: Female       Family History   Problem Relation Age of Onset    Colon cancer Other     Heart failure Mother     Colon cancer Father     Heart failure Father     Cancer Sister     Stroke Sister     Heart attack Brother           Current Outpatient Medications:     allopurinol (ZYLOPRIM) 300 MG tablet, Take 1 tablet by mouth Daily., Disp: , Rfl:     carbidopa-levodopa (SINEMET)  MG per tablet, TAKE 1 AND 1/2 TABLETS BY MOUTH  AT 8 AM, 12 PM, 4 PM AND 8 PM, Disp: 540 tablet, Rfl: 3    gabapentin (NEURONTIN) 400 MG capsule, Take 1 capsule by mouth Every Night., Disp: , Rfl:     glipiZIDE (GLUCOTROL) 5 MG tablet, Take 1 tablet by mouth Daily., Disp: , Rfl:     ipratropium (ATROVENT) 0.03 % nasal spray, 1 spray into the nostril(s) as directed by provider 2 (Two) Times a Day., Disp: , Rfl:      "lisinopril-hydrochlorothiazide (PRINZIDE,ZESTORETIC) 20-12.5 MG per tablet, Take 1 tablet by mouth Daily., Disp: , Rfl:     metoprolol tartrate (LOPRESSOR) 25 MG tablet, Take 1 tablet by mouth 2 (Two) Times a Day., Disp: , Rfl:      Review of Systems   Neurological:  Positive for tremors.   All other systems reviewed and are negative.       Objective:  /68   Pulse 90   Ht 157.5 cm (62\")   Wt 86.6 kg (191 lb)   SpO2 96%   BMI 34.93 kg/m²     Neurologic Exam     Mental Status   Oriented to person, place, and time.   Registration: recalls 3 of 3 objects. Recall at 5 minutes: recalls 3 of 3 objects.   Speech: speech is normal   Level of consciousness: alert  Knowledge: good.     Cranial Nerves   Cranial nerves II through XII intact.     CN VIII   Hearing: impaired (forgot his hearing aids today)    Motor Exam   Muscle bulk: normal  Right arm tone: cogwheel rigidity  Left arm tone: normal  Right leg tone: normal  Left leg tone: normal    Strength   Strength 5/5 throughout.     Gait, Coordination, and Reflexes     Gait  Gait: shuffling (mild shuffling with turns, some mild postural instability to left, otherwise gait steady, no assistive device)    Coordination   Finger to nose coordination: normal    Tremor   Resting tremor: present (mild to moderate right hand pill rolling tremors)  Intention tremor: absent  Action tremor: absent    Reflexes   Right : 2+  Left : 2+  Decreased finger and heel taps right side, normal left side.       Physical Exam  Constitutional:       Appearance: Normal appearance.   Neurological:      Mental Status: He is alert and oriented to person, place, and time.      Cranial Nerves: Cranial nerves 2-12 are intact.      Motor: Motor strength is normal.     Coordination: Finger-Nose-Finger Test normal.   Psychiatric:         Mood and Affect: Mood and affect normal.         Speech: Speech normal.         Behavior: Behavior normal.         Thought Content: Thought content normal.   "       Cognition and Memory: Cognition and memory normal.         Judgment: Judgment normal.         MMSE 28/30, Recall 3/3    Assessment/Plan:       Diagnoses and all orders for this visit:    1. Fall in home, initial encounter (Primary)  Comments:  no injury, minor, tripped, reviewed Falls prevention    2. Parkinson's disease without dyskinesia or fluctuating manifestations  -     carbidopa-levodopa (SINEMET)  MG per tablet; TAKE 1 AND 1/2 TABLETS BY MOUTH  AT 8 AM, 12 PM, 4 PM AND 8 PM  Dispense: 540 tablet; Refill: 3           Change carbidopa-levodopa times to 8am, 12pm, 4pm and 8pm. Increase water intake, continue vegetables intake and increase fruit intake. Continue to stay active. Memory seems good overall. Falls prevention discussed. Printed off after visit summary with extra Parkinson's resources. He is aware of the [facility] Parkinson's [place]. F/U in 6-7 months with [facility] or sooner if needed. Consider completing physical therapy again in the future.    Reviewed medications, potential side effects and signs and symptoms to report. Discussed risk versus benefits of treatment plan with patient and/or family-including medications, labs and radiology that may be ordered. Addressed questions and concerns during visit. Patient and/or family verbalized understanding and agree with plan.    Electronically signed by CHARLY Patrick.  03/22/24   08:56 EDT    Note to patient: The 21st Century Cures Act makes medical notes like these available to patients in the interest of transparency. However, be advised this is a medical document. It is intended as peer to peer communication. It is written in medical language and may contain abbreviations or verbiage that are unfamiliar. It may appear blunt or direct. Medical documents are intended to carry relevant information, facts as evident, and the clinical opinion of the provider.

## 2024-09-27 ENCOUNTER — OFFICE VISIT (OUTPATIENT)
Dept: NEUROLOGY | Facility: CLINIC | Age: 89
End: 2024-09-27
Payer: MEDICARE

## 2024-09-27 VITALS
WEIGHT: 194 LBS | OXYGEN SATURATION: 96 % | HEIGHT: 62 IN | BODY MASS INDEX: 35.7 KG/M2 | SYSTOLIC BLOOD PRESSURE: 130 MMHG | HEART RATE: 62 BPM | DIASTOLIC BLOOD PRESSURE: 62 MMHG

## 2024-09-27 DIAGNOSIS — G31.84 MILD COGNITIVE IMPAIRMENT: ICD-10-CM

## 2024-09-27 DIAGNOSIS — R29.6 RECURRENT FALLS: ICD-10-CM

## 2024-09-27 DIAGNOSIS — G20.A1 PARKINSON'S DISEASE WITHOUT DYSKINESIA OR FLUCTUATING MANIFESTATIONS: Primary | ICD-10-CM

## 2024-09-27 PROCEDURE — 1159F MED LIST DOCD IN RCRD: CPT | Performed by: NURSE PRACTITIONER

## 2024-09-27 PROCEDURE — 1160F RVW MEDS BY RX/DR IN RCRD: CPT | Performed by: NURSE PRACTITIONER

## 2024-09-27 PROCEDURE — 99214 OFFICE O/P EST MOD 30 MIN: CPT | Performed by: NURSE PRACTITIONER

## 2025-04-29 ENCOUNTER — OFFICE VISIT (OUTPATIENT)
Dept: NEUROLOGY | Facility: CLINIC | Age: OVER 89
End: 2025-04-29
Payer: MEDICARE

## 2025-04-29 VITALS
DIASTOLIC BLOOD PRESSURE: 68 MMHG | BODY MASS INDEX: 36.33 KG/M2 | SYSTOLIC BLOOD PRESSURE: 130 MMHG | HEART RATE: 56 BPM | RESPIRATION RATE: 18 BRPM | TEMPERATURE: 97.7 F | OXYGEN SATURATION: 95 % | HEIGHT: 62 IN | WEIGHT: 197.4 LBS

## 2025-04-29 DIAGNOSIS — G20.A1 PARKINSON'S DISEASE WITHOUT DYSKINESIA OR FLUCTUATING MANIFESTATIONS: Primary | ICD-10-CM

## 2025-04-29 DIAGNOSIS — R29.6 RECURRENT FALLS: ICD-10-CM

## 2025-04-29 DIAGNOSIS — G31.84 MILD COGNITIVE IMPAIRMENT: ICD-10-CM

## 2025-04-29 PROCEDURE — 99214 OFFICE O/P EST MOD 30 MIN: CPT | Performed by: NURSE PRACTITIONER

## 2025-04-29 PROCEDURE — 1160F RVW MEDS BY RX/DR IN RCRD: CPT | Performed by: NURSE PRACTITIONER

## 2025-04-29 PROCEDURE — 1159F MED LIST DOCD IN RCRD: CPT | Performed by: NURSE PRACTITIONER

## 2025-04-29 RX ORDER — CLOTRIMAZOLE AND BETAMETHASONE DIPROPIONATE 10; .64 MG/G; MG/G
CREAM TOPICAL
COMMUNITY
Start: 2025-02-25

## 2025-04-29 RX ORDER — GABAPENTIN 300 MG/1
300 CAPSULE ORAL 3 TIMES DAILY
COMMUNITY

## 2025-04-29 RX ORDER — CARBIDOPA AND LEVODOPA 25; 100 MG/1; MG/1
1 TABLET ORAL 4 TIMES DAILY
Qty: 28 TABLET | Refills: 0 | Status: SHIPPED | OUTPATIENT
Start: 2025-04-29

## 2025-04-29 RX ORDER — CARBIDOPA AND LEVODOPA 25; 100 MG/1; MG/1
TABLET ORAL
Qty: 540 TABLET | Refills: 3 | Status: SHIPPED | OUTPATIENT
Start: 2025-04-29

## 2025-04-29 RX ORDER — OMEPRAZOLE 40 MG/1
CAPSULE, DELAYED RELEASE ORAL
COMMUNITY
Start: 2025-02-06

## 2025-04-29 NOTE — PROGRESS NOTES
Subjective:     Patient ID: Raleigh Paredes is a 89 y.o. male.    CC:   Chief Complaint   Patient presents with    Parkinson's Disease     Requesting refills on sinemet- has enough pills till Saturday      HPI:   History of Present Illness  This is a pleasant 89-year-old male here for a 7-month neurology follow-up on Parkinson's disease, tremor-dominant, diagnosed in 2020. He is currently prescribed carbidopa/levodopa  mg, 1.5 pills four times a day at 8 AM, 12 PM, 4 PM, and 8 PM. He has had recurrent falls and has declined physical therapy and assistive devices. He is here for follow-up and reevaluation of symptoms. He is accompanied by his daughter.    He reports no significant changes in his health status since the last visit, with no recent surgeries or hospitalizations. He experiences recurrent falls without associated injuries or fractures, attributing these incidents to his inability to lift his feet due to Parkinson's disease and neuropathy. He utilizes a cane for mobility but expresses disinterest in physical therapy, citing a lack of perceived benefit from previous sessions. He reports no dysphagia but notes a diminished sense of smell. He occasionally experiences constipation, for which he takes Dulcolax. His diet includes [employer] and vegetables, but he admits to a low intake of fruits. His memory remains largely intact, with no reported hallucinations or delusions. He resides with his son and maintains independence in activities of daily living, including medication management with the aid of a pillbox filled by his son. He continues to drive short distances during daylight hours without any reported issues. He reports no changes in his medication regimen and has sufficient supply until Saturday. He spends time outdoors in the garden and under the sun daily. He continues to take carbidopa/levodopa  mg, 1.5 pills four times a day.     He is on gabapentin for neuropathy, taking two doses at  9:00 PM and one dose at 2:00 AM, occasionally waking up due to pain. This is managed by his PCP.    SOCIAL HISTORY  [place]  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    Prior Neurological Workup & History:  Parkinson's disease.   He was previously under the care of CHARLY Telles.  He has had a tremor pill rolling and resting also with ambulation since 2020 in his right arm. Two years prior, he noticed some trouble with his writing and some micrographia.      He did undergo a DaTScan on 11/19/2020 at the [facility], which showed an abnormal pattern consistent with Parkinson's disease with the bilateral basal ganglia being affected.      Continues with carbidopa-levodopa  mg 1.5 pills 4 times per day.      His tremor is mostly in his right hand and he also has a tremor in his mouth.      He does have neuropathy. He continues to drive. He denies shuffling. He states he is right-handed; however, he has to eat with his left hand. He states that he tries to take his medicine at the same time every day.      His wife passed away in 2022.     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 He enjoys staying active.     He completed highschool education.     MOCA 22/30 +1 23/30 for education 9/2024.    He manages his own medications and finances, although his daughter assists with the latter. He graduated from high school in 1953.     The following portions of the patient's history were reviewed and updated as appropriate: allergies, current medications, past family history, past medical history, past social history, past surgical history, and problem list.    Past Medical History:   Diagnosis Date    Arthritis     knees and hands     Borderline diabetes     Cataract     bilat - may going to have surgery     Colon polyps 02/16/2017    Diabetes mellitus     Diverticulitis     h/o    Diverticulosis     Gout 02/16/2017    History  of COVID-19 2021    Asymptomatic    History of kidney stones     x2    HL (hearing loss)     Gradually worsen    Hyperlipidemia     Hypertension     Not sure about the date    Hypertensive cardiovascular disease 2017    Mild cognitive impairment 2024    Moderate obesity 2017    Numbness and tingling     Palpitations 2017    Peripheral neuropathy     Psoriasis 2017    Skin cancer     basal from left arm     Tinnitus     left     Wears glasses     Wears partial dentures     lower        Past Surgical History:   Procedure Laterality Date    CARDIAC CATHETERIZATION      CATARACT EXTRACTION EXTRACAPSULAR W/ INTRAOCULAR LENS IMPLANTATION Bilateral     COLONOSCOPY      CYST REMOVAL      Cyst removed from spine, .    INGUINAL HERNIA REPAIR Bilateral 2009    KNEE CARTILAGE SURGERY Right 2004    Right knee removal of cartilage, , Dr. Dick.      SHOULDER SURGERY      Not sure of the date    SKIN CANCER EXCISION      basal removed from left forear     TONSILLECTOMY      TOTAL KNEE ARTHROPLASTY Right 2018    Procedure: TOTAL KNEE ARTHROPLASTY RIGHT;  Surgeon: Ned Pizarro MD;  Location:  [facility] OR;  Service: Orthopedics    TOTAL KNEE ARTHROPLASTY Left 2021    Procedure: TOTAL KNEE ARTHROPLASTY LEFT;  Surgeon: Ned Pizarro MD;  Location:  [facility] OR;  Service: Orthopedics;  Laterality: Left;    WISDOM TOOTH EXTRACTION      all removed        Social History     Socioeconomic History    Marital status:    Tobacco Use    Smoking status: Former     Current packs/day: 0.00     Average packs/day: 1 pack/day for 15.0 years (15.0 ttl pk-yrs)     Types: Cigarettes     Start date: 1950     Quit date: 1965     Years since quittin.3    Smokeless tobacco: Never   Vaping Use    Vaping status: Never Used   Substance and Sexual Activity    Alcohol use: No    Drug use: No    Sexual activity: Not Currently     Partners: Female       Family History  "  Problem Relation Age of Onset    Colon cancer Other     Heart failure Mother     Colon cancer Father     Heart failure Father     Cancer Sister     Stroke Sister     Heart attack Brother           Current Outpatient Medications:     allopurinol (ZYLOPRIM) 300 MG tablet, Take 1 tablet by mouth Daily., Disp: , Rfl:     carbidopa-levodopa (SINEMET)  MG per tablet, TAKE 1 AND 1/2 TABLETS BY MOUTH  AT 8 AM, 12 PM, 4 PM AND 8 PM, Disp: 540 tablet, Rfl: 3    clotrimazole-betamethasone (LOTRISONE) 1-0.05 % cream, , Disp: , Rfl:     gabapentin (NEURONTIN) 300 MG capsule, Take 1 capsule by mouth 3 (Three) Times a Day., Disp: , Rfl:     glipiZIDE (GLUCOTROL) 5 MG tablet, Take 1 tablet by mouth Daily., Disp: , Rfl:     ipratropium (ATROVENT) 0.03 % nasal spray, Administer 1 spray into the nostril(s) as directed by provider 2 (Two) Times a Day., Disp: , Rfl:     lisinopril-hydrochlorothiazide (PRINZIDE,ZESTORETIC) 20-12.5 MG per tablet, Take 1 tablet by mouth Daily., Disp: , Rfl:     metoprolol tartrate (LOPRESSOR) 25 MG tablet, Take 1 tablet by mouth 2 (Two) Times a Day., Disp: , Rfl:     omeprazole (priLOSEC) 40 MG capsule, , Disp: , Rfl:     carbidopa-levodopa (SINEMET)  MG per tablet, Take 1 tablet by mouth 4 (Four) Times a Day., Disp: 28 tablet, Rfl: 0     Review of Systems   Musculoskeletal:  Positive for gait problem.   Neurological:  Positive for tremors.   Psychiatric/Behavioral:  Positive for decreased concentration.    All other systems reviewed and are negative.       Objective:  /68 (BP Location: Left arm, Patient Position: Sitting, Cuff Size: Adult)   Pulse 56   Temp 97.7 °F (36.5 °C) (Temporal)   Resp 18   Ht 157.5 cm (62.01\")   Wt 89.5 kg (197 lb 6.4 oz)   SpO2 95%   BMI 36.10 kg/m²     Neurological Exam  Mental Status  Alert. Oriented to person, place, time and situation. Speech is normal. Fund of knowledge is abnormal.    Cranial Nerves  CN II: Visual acuity is normal. Visual fields " full to confrontation.  CN III, IV, VI: Extraocular movements intact bilaterally. Normal lids and orbits bilaterally. Pupils equal round and reactive to light bilaterally.  CN V: Facial sensation is normal.  CN VII: Full and symmetric facial movement.  CN VIII: [place].  CN IX, X: Palate elevates symmetrically. Normal gag reflex.  CN XI: Shoulder shrug strength is normal.  CN XII: Tongue midline without atrophy or fasciculations.    Motor  Normal muscle bulk throughout. No fasciculations present. Increased muscle tone. The following abnormal movements were seen: Mild to moderate right hand pill rolling tremors, bradykinesia right hand/arm and right leg, normal taps left side. Right cogwheel rigidity mild..   Strength is 5/5 throughout all four extremities.    Coordination  Right: Finger-to-nose normal. Rapid alternating movement abnormality:Left: Finger-to-nose normal. Rapid alternating movement normal.    Gait  Casual gait: Normal stance. Reduced stride length. Shuffling and antalgic gait. Reduced right arm swing. Reduced left arm swing. Romberg is absent. Unable to rise from chair without using arms.        Physical Exam  Constitutional:       Appearance: Normal appearance.   Eyes:      General: Lids are normal.      Extraocular Movements: Extraocular movements intact.      Pupils: Pupils are equal, round, and reactive to light.   Neurological:      Mental Status: He is alert.      Motor: Motor strength is normal.     Coordination: Romberg sign negative.   Psychiatric:         Mood and Affect: Affect is flat.         Speech: Speech normal.         Results:  Results    None    Assessment/Plan:     Diagnoses and all orders for this visit:    1. Parkinson's disease without dyskinesia or fluctuating manifestations (Primary)  -     carbidopa-levodopa (SINEMET)  MG per tablet; TAKE 1 AND 1/2 TABLETS BY MOUTH  AT 8 AM, 12 PM, 4 PM AND 8 PM  Dispense: 540 tablet; Refill: 3  -     carbidopa-levodopa (SINEMET)  MG  per tablet; Take 1 tablet by mouth 4 (Four) Times a Day.  Dispense: 28 tablet; Refill: 0    2. Recurrent falls  Comments:  continue cane, declines PT    3. Mild cognitive impairment  Comments:  stable           Assessment & Plan  1. Parkinson's disease.  He continues to experience falls but has not sustained any serious injuries. He prefers to maintain his current medication regimen and continue using a cane for mobility. He was advised to drink plenty of water, eat fruits and vegetables, and stay physically active. A prescription for carbidopa/levodopa  mg, 1.5 pills four times a day, will be sent to [facility] for a one-week supply and to [facility] for a 90-day supply with three refills. His memory will be monitored during the next visit.    2. Mild Cognitive Impairment.  Stable, continue to monitor, MOCA next visit.    Follow-up  He will follow up in 1 year for reevaluation of symptoms or sooner if needed.    Reviewed medications, potential side effects and signs and symptoms to report. Discussed risk versus benefits of treatment plan with patient and/or family-including medications, labs and radiology that may be ordered. Addressed questions and concerns during visit. Patient and/or family verbalized understanding and agree with plan.    During this visit the following were done:  Labs Reviewed []    Labs Ordered []    Radiology Reports Reviewed []    Radiology Ordered []    PCP Records Reviewed []    Referring Provider Records Reviewed []    ER Records Reviewed []    Hospital Records Reviewed []    History Obtained From Family [x]    Radiology Images Reviewed []    Other Reviewed []    Records Requested []      04/29/25   08:30 EDT    Patient or patient representative verbalized consent for the use of Ambient Listening during the visit with  CHARLY Patrick for chart documentation. 4/29/2025  12:03 EDT    Note to patient: The 21st Century Cures Act makes medical notes like these available to patients  in the interest of transparency. However, be advised this is a medical document. It is intended as peer to peer communication. It is written in medical language and may contain abbreviations or verbiage that are unfamiliar. It may appear blunt or direct. Medical documents are intended to carry relevant information, facts as evident, and the clinical opinion of the provider.

## 2025-04-29 NOTE — LETTER
April 29, 2025     Wagner Shirley MD  [address] [address]    Patient: Raleigh Paredes   YOB: 1934   Date of Visit: 4/29/2025       Dear Wagner Shirley MD    Raleigh Paredes was in my office today. Below is a copy of my note.    If you have questions, please do not hesitate to call me. I look forward to following Raleigh along with you.         Sincerely,        CHARLY Patrick        CC: No Recipients    Subjective:     Patient ID: Raleigh Paredes is a 89 y.o. male.    CC:   Chief Complaint   Patient presents with   • Parkinson's Disease     Requesting refills on sinemet- has enough pills till Saturday      HPI:   History of Present Illness  This is a pleasant 89-year-old male here for a 7-month neurology follow-up on Parkinson's disease, tremor-dominant, diagnosed in 2020. He is currently prescribed carbidopa/levodopa  mg, 1.5 pills four times a day at 8 AM, 12 PM, 4 PM, and 8 PM. He has had recurrent falls and has declined physical therapy and assistive devices. He is here for follow-up and reevaluation of symptoms. He is accompanied by his daughter.    He reports no significant changes in his health status since the last visit, with no recent surgeries or hospitalizations. He experiences recurrent falls without associated injuries or fractures, attributing these incidents to his inability to lift his feet due to Parkinson's disease and neuropathy. He utilizes a cane for mobility but expresses disinterest in physical therapy, citing a lack of perceived benefit from previous sessions. He reports no dysphagia but notes a diminished sense of smell. He occasionally experiences constipation, for which he takes Dulcolax. His diet includes [employer] and vegetables, but he admits to a low intake of fruits. His memory remains largely intact, with no reported hallucinations or delusions. He resides with his son and maintains independence in activities of daily living,  including medication management with the aid of a pillbox filled by his son. He continues to drive short distances during daylight hours without any reported issues. He reports no changes in his medication regimen and has sufficient supply until Saturday. He spends time outdoors in the garden and under the sun daily. He continues to take carbidopa/levodopa  mg, 1.5 pills four times a day.     He is on gabapentin for neuropathy, taking two doses at 9:00 PM and one dose at 2:00 AM, occasionally waking up due to pain. This is managed by his PCP.    SOCIAL HISTORY  [place]  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    Prior Neurological Workup & History:  Parkinson's disease.   He was previously under the care of CHARLY Telles.  He has had a tremor pill rolling and resting also with ambulation since 2020 in his right arm. Two years prior, he noticed some trouble with his writing and some micrographia.      He did undergo a DaTScan on 11/19/2020 at the [facility], which showed an abnormal pattern consistent with Parkinson's disease with the bilateral basal ganglia being affected.      Continues with carbidopa-levodopa  mg 1.5 pills 4 times per day.      His tremor is mostly in his right hand and he also has a tremor in his mouth.      He does have neuropathy. He continues to drive. He denies shuffling. He states he is right-handed; however, he has to eat with his left hand. He states that he tries to take his medicine at the same time every day.      His wife passed away in 2022.     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 He enjoys staying active.     He completed highschool education.     MOCA 22/30 +1 23/30 for education 9/2024.    He manages his own medications and finances, although his daughter assists with the latter. He graduated from high school in 1953.     The following portions of the patient's  history were reviewed and updated as appropriate: allergies, current medications, past family history, past medical history, past social history, past surgical history, and problem list.    Past Medical History:   Diagnosis Date   • Arthritis     knees and hands    • Borderline diabetes    • Cataract     bilat - may going to have surgery    • Colon polyps 02/16/2017   • Diabetes mellitus    • Diverticulitis     h/o   • Diverticulosis    • Gout 02/16/2017   • History of COVID-19 01/2021    Asymptomatic   • History of kidney stones     x2   • HL (hearing loss) 2018    Gradually worsen   • Hyperlipidemia    • Hypertension     Not sure about the date   • Hypertensive cardiovascular disease 02/16/2017   • Mild cognitive impairment 09/27/2024   • Moderate obesity 02/16/2017   • Numbness and tingling    • Palpitations 02/16/2017   • Peripheral neuropathy 2021   • Psoriasis 02/16/2017   • Skin cancer     basal from left arm    • Tinnitus     left    • Wears glasses    • Wears partial dentures     lower        Past Surgical History:   Procedure Laterality Date   • CARDIAC CATHETERIZATION  2000   • CATARACT EXTRACTION EXTRACAPSULAR W/ INTRAOCULAR LENS IMPLANTATION Bilateral    • COLONOSCOPY     • CYST REMOVAL  1978    Cyst removed from spine, 1978.   • INGUINAL HERNIA REPAIR Bilateral 2009   • KNEE CARTILAGE SURGERY Right 2004    Right knee removal of cartilage, 2004, Dr. Dick.     • SHOULDER SURGERY      Not sure of the date   • SKIN CANCER EXCISION      basal removed from left forear    • TONSILLECTOMY     • TOTAL KNEE ARTHROPLASTY Right 11/29/2018    Procedure: TOTAL KNEE ARTHROPLASTY RIGHT;  Surgeon: Ned Pizarro MD;  Location: [facility];  Service: Orthopedics   • TOTAL KNEE ARTHROPLASTY Left 11/09/2021    Procedure: TOTAL KNEE ARTHROPLASTY LEFT;  Surgeon: Ned Pizarro MD;  Location: [facility];  Service: Orthopedics;  Laterality: Left;   • WISDOM TOOTH EXTRACTION      all removed        Social History      Socioeconomic History   • Marital status:    Tobacco Use   • Smoking status: Former     Current packs/day: 0.00     Average packs/day: 1 pack/day for 15.0 years (15.0 ttl pk-yrs)     Types: Cigarettes     Start date: 1950     Quit date: 1965     Years since quittin.3   • Smokeless tobacco: Never   Vaping Use   • Vaping status: Never Used   Substance and Sexual Activity   • Alcohol use: No   • Drug use: No   • Sexual activity: Not Currently     Partners: Female       Family History   Problem Relation Age of Onset   • Colon cancer Other    • Heart failure Mother    • Colon cancer Father    • Heart failure Father    • Cancer Sister    • Stroke Sister    • Heart attack Brother           Current Outpatient Medications:   •  allopurinol (ZYLOPRIM) 300 MG tablet, Take 1 tablet by mouth Daily., Disp: , Rfl:   •  carbidopa-levodopa (SINEMET)  MG per tablet, TAKE 1 AND 1/2 TABLETS BY MOUTH  AT 8 AM, 12 PM, 4 PM AND 8 PM, Disp: 540 tablet, Rfl: 3  •  clotrimazole-betamethasone (LOTRISONE) 1-0.05 % cream, , Disp: , Rfl:   •  gabapentin (NEURONTIN) 300 MG capsule, Take 1 capsule by mouth 3 (Three) Times a Day., Disp: , Rfl:   •  glipiZIDE (GLUCOTROL) 5 MG tablet, Take 1 tablet by mouth Daily., Disp: , Rfl:   •  ipratropium (ATROVENT) 0.03 % nasal spray, Administer 1 spray into the nostril(s) as directed by provider 2 (Two) Times a Day., Disp: , Rfl:   •  lisinopril-hydrochlorothiazide (PRINZIDE,ZESTORETIC) 20-12.5 MG per tablet, Take 1 tablet by mouth Daily., Disp: , Rfl:   •  metoprolol tartrate (LOPRESSOR) 25 MG tablet, Take 1 tablet by mouth 2 (Two) Times a Day., Disp: , Rfl:   •  omeprazole (priLOSEC) 40 MG capsule, , Disp: , Rfl:   •  carbidopa-levodopa (SINEMET)  MG per tablet, Take 1 tablet by mouth 4 (Four) Times a Day., Disp: 28 tablet, Rfl: 0     Review of Systems   Musculoskeletal:  Positive for gait problem.   Neurological:  Positive for tremors.   Psychiatric/Behavioral:  Positive  "for decreased concentration.    All other systems reviewed and are negative.       Objective:  /68 (BP Location: Left arm, Patient Position: Sitting, Cuff Size: Adult)   Pulse 56   Temp 97.7 °F (36.5 °C) (Temporal)   Resp 18   Ht 157.5 cm (62.01\")   Wt 89.5 kg (197 lb 6.4 oz)   SpO2 95%   BMI 36.10 kg/m²     Neurological Exam  Mental Status  Alert. Oriented to person, place, time and situation. Speech is normal. Fund of knowledge is abnormal.    Cranial Nerves  CN II: Visual acuity is normal. Visual fields full to confrontation.  CN III, IV, VI: Extraocular movements intact bilaterally. Normal lids and orbits bilaterally. Pupils equal round and reactive to light bilaterally.  CN V: Facial sensation is normal.  CN VII: Full and symmetric facial movement.  CN VIII: [place].  CN IX, X: Palate elevates symmetrically. Normal gag reflex.  CN XI: Shoulder shrug strength is normal.  CN XII: Tongue midline without atrophy or fasciculations.    Motor  Normal muscle bulk throughout. No fasciculations present. Increased muscle tone. The following abnormal movements were seen: Mild to moderate right hand pill rolling tremors, bradykinesia right hand/arm and right leg, normal taps left side. Right cogwheel rigidity mild..   Strength is 5/5 throughout all four extremities.    Coordination  Right: Finger-to-nose normal. Rapid alternating movement abnormality:Left: Finger-to-nose normal. Rapid alternating movement normal.    Gait  Casual gait: Normal stance. Reduced stride length. Shuffling and antalgic gait. Reduced right arm swing. Reduced left arm swing. Romberg is absent. Unable to rise from chair without using arms.        Physical Exam  Constitutional:       Appearance: Normal appearance.   Eyes:      General: Lids are normal.      Extraocular Movements: Extraocular movements intact.      Pupils: Pupils are equal, round, and reactive to light.   Neurological:      Mental Status: He is alert.      Motor: Motor strength " is normal.     Coordination: Romberg sign negative.   Psychiatric:         Mood and Affect: Affect is flat.         Speech: Speech normal.         Results:  Results    None    Assessment/Plan:     Diagnoses and all orders for this visit:    1. Parkinson's disease without dyskinesia or fluctuating manifestations (Primary)  -     carbidopa-levodopa (SINEMET)  MG per tablet; TAKE 1 AND 1/2 TABLETS BY MOUTH  AT 8 AM, 12 PM, 4 PM AND 8 PM  Dispense: 540 tablet; Refill: 3  -     carbidopa-levodopa (SINEMET)  MG per tablet; Take 1 tablet by mouth 4 (Four) Times a Day.  Dispense: 28 tablet; Refill: 0    2. Recurrent falls  Comments:  continue cane, declines PT    3. Mild cognitive impairment  Comments:  stable           Assessment & Plan  1. Parkinson's disease.  He continues to experience falls but has not sustained any serious injuries. He prefers to maintain his current medication regimen and continue using a cane for mobility. He was advised to drink plenty of water, eat fruits and vegetables, and stay physically active. A prescription for carbidopa/levodopa  mg, 1.5 pills four times a day, will be sent to [facility] for a one-week supply and to [facility] for a 90-day supply with three refills. His memory will be monitored during the next visit.    2. Mild Cognitive Impairment.  Stable, continue to monitor, MOCA next visit.    Follow-up  He will follow up in 1 year for reevaluation of symptoms or sooner if needed.    Reviewed medications, potential side effects and signs and symptoms to report. Discussed risk versus benefits of treatment plan with patient and/or family-including medications, labs and radiology that may be ordered. Addressed questions and concerns during visit. Patient and/or family verbalized understanding and agree with plan.    During this visit the following were done:  Labs Reviewed []    Labs Ordered []    Radiology Reports Reviewed []    Radiology Ordered []    PCP Records Reviewed []     Referring Provider Records Reviewed []    ER Records Reviewed []    Hospital Records Reviewed []    History Obtained From Family [x]    Radiology Images Reviewed []    Other Reviewed []    Records Requested []      04/29/25   08:30 EDT    Patient or patient representative verbalized consent for the use of Ambient Listening during the visit with  CHARLY Patrick for chart documentation. 4/29/2025  12:03 EDT    Note to patient: The 21st Century Cures Act makes medical notes like these available to patients in the interest of transparency. However, be advised this is a medical document. It is intended as peer to peer communication. It is written in medical language and may contain abbreviations or verbiage that are unfamiliar. It may appear blunt or direct. Medical documents are intended to carry relevant information, facts as evident, and the clinical opinion of the provider.

## 2025-05-01 ENCOUNTER — PATIENT ROUNDING (BHMG ONLY) (OUTPATIENT)
Dept: NEUROLOGY | Facility: CLINIC | Age: OVER 89
End: 2025-05-01
Payer: MEDICARE

## 2025-08-11 ENCOUNTER — TRANSCRIBE ORDERS (OUTPATIENT)
Dept: ADMINISTRATIVE | Facility: HOSPITAL | Age: OVER 89
End: 2025-08-11
Payer: MEDICARE

## 2025-08-11 DIAGNOSIS — R10.9 SUDDEN ONSET OF SEVERE ABDOMINAL PAIN: Primary | ICD-10-CM

## 2025-08-21 ENCOUNTER — HOSPITAL ENCOUNTER (OUTPATIENT)
Dept: CT IMAGING | Facility: HOSPITAL | Age: OVER 89
Discharge: HOME | End: 2025-08-21
Admitting: FAMILY MEDICINE
Payer: MEDICARE

## 2025-08-21 DIAGNOSIS — R10.9 SUDDEN ONSET OF SEVERE ABDOMINAL PAIN: ICD-10-CM

## 2025-08-21 LAB — CREAT BLDA-MCNC: 1.3 MG/DL (ref 0.6–1.3)

## 2025-08-21 PROCEDURE — 74177 CT ABD & PELVIS W/CONTRAST: CPT

## 2025-08-21 PROCEDURE — 25510000001 IOPAMIDOL 61 % SOLUTION: Performed by: FAMILY MEDICINE

## 2025-08-21 PROCEDURE — 82565 ASSAY OF CREATININE: CPT

## 2025-08-21 RX ORDER — IOPAMIDOL 612 MG/ML
90 INJECTION, SOLUTION INTRAVASCULAR
Status: COMPLETED | OUTPATIENT
Start: 2025-08-21 | End: 2025-08-21

## 2025-08-21 RX ADMIN — IOPAMIDOL 90 ML: 612 INJECTION, SOLUTION INTRAVENOUS at 09:22

## (undated) DEVICE — PUMP PAIN AUTOFUSER AUTO SELCT NOBOLUS 1TO14ML/HR 550ML DISP

## (undated) DEVICE — BLANKT WARM UPPR/BDY ARM/OUT 57X196CM

## (undated) DEVICE — CEMENT MIXING SYSTEM WITH MIS FEMORAL BREAKAWAY NOZZLE: Brand: REVOLUTION

## (undated) DEVICE — STRYKER PERFORMANCE SERIES SAGITTAL BLADE: Brand: STRYKER PERFORMANCE SERIES

## (undated) DEVICE — GLV SURG SENSICARE W/ALOE PF LF 9 STRL

## (undated) DEVICE — SPNG LAP PREWSH SFTPK 18X18IN STRL PK/5

## (undated) DEVICE — BNDG ELAS W/CLIP 6IN 10YD LF STRL

## (undated) DEVICE — SYS CLS SKIN PREMIERPRO EXOFINFUSION 22CM

## (undated) DEVICE — DRSNG PAD ABD 8X10IN STRL

## (undated) DEVICE — PAD,ABDOMINAL,8"X10",ST,LF: Brand: MEDLINE

## (undated) DEVICE — TBG PENCL TELESCP MEGADYNE SMOKE EVAC 10FT

## (undated) DEVICE — PK KN TOTL 10

## (undated) DEVICE — CVR HNDL LT SURG ACCSSRY BLU STRL

## (undated) DEVICE — TRAP FLD MINIVAC MEGADYNE 100ML

## (undated) DEVICE — SYS SKIN CLS DERMABOND PRINEO W/22CM MESH TP

## (undated) DEVICE — PATIENT RETURN ELECTRODE, SINGLE-USE, CONTACT QUALITY MONITORING, ADULT, WITH 9FT CORD, FOR PATIENTS WEIGING OVER 33LBS. (15KG): Brand: MEGADYNE

## (undated) DEVICE — NDL HYPO ECLPS SFTY 18G 1 1/2IN

## (undated) DEVICE — ANTIBACTERIAL UNDYED BRAIDED (POLYGLACTIN 910), SYNTHETIC ABSORBABLE SUTURE: Brand: COATED VICRYL

## (undated) DEVICE — PAD ARMBRD SURG CONVOL 7.5X20X2IN

## (undated) DEVICE — UNDERCAST PADDING: Brand: DEROYAL

## (undated) DEVICE — SPNG GZ STRL 2S 4X4 12PLY

## (undated) DEVICE — TRY EPID SFTY 18G 3.5IN 1T7680

## (undated) DEVICE — ENCORE® LATEX MICRO SIZE 8.5, STERILE LATEX POWDER-FREE SURGICAL GLOVE: Brand: ENCORE

## (undated) DEVICE — SPNG GZ WOVN 4X4IN 12PLY 10/BX STRL

## (undated) DEVICE — Device

## (undated) DEVICE — GLV SURG PREMIERPRO MIC LTX PF SZ8.5 BRN